# Patient Record
Sex: MALE | NOT HISPANIC OR LATINO
[De-identification: names, ages, dates, MRNs, and addresses within clinical notes are randomized per-mention and may not be internally consistent; named-entity substitution may affect disease eponyms.]

---

## 2017-10-23 ENCOUNTER — CHART COPY (OUTPATIENT)
Age: 67
End: 2017-10-23

## 2017-10-25 ENCOUNTER — APPOINTMENT (OUTPATIENT)
Dept: ORTHOPEDIC SURGERY | Facility: CLINIC | Age: 67
End: 2017-10-25
Payer: COMMERCIAL

## 2017-10-25 VITALS
WEIGHT: 137 LBS | HEIGHT: 62 IN | HEART RATE: 97 BPM | DIASTOLIC BLOOD PRESSURE: 73 MMHG | SYSTOLIC BLOOD PRESSURE: 129 MMHG | BODY MASS INDEX: 25.21 KG/M2

## 2017-10-25 DIAGNOSIS — M54.16 RADICULOPATHY, LUMBAR REGION: ICD-10-CM

## 2017-10-25 PROCEDURE — 99213 OFFICE O/P EST LOW 20 MIN: CPT

## 2017-10-26 ENCOUNTER — EMERGENCY (EMERGENCY)
Facility: HOSPITAL | Age: 67
LOS: 1 days | Discharge: HOME | End: 2017-10-26
Attending: EMERGENCY MEDICINE | Admitting: EMERGENCY MEDICINE
Payer: COMMERCIAL

## 2017-10-26 VITALS
SYSTOLIC BLOOD PRESSURE: 128 MMHG | RESPIRATION RATE: 17 BRPM | OXYGEN SATURATION: 99 % | TEMPERATURE: 98 F | DIASTOLIC BLOOD PRESSURE: 78 MMHG | HEART RATE: 102 BPM

## 2017-10-26 VITALS
HEIGHT: 63 IN | RESPIRATION RATE: 18 BRPM | DIASTOLIC BLOOD PRESSURE: 93 MMHG | TEMPERATURE: 98 F | OXYGEN SATURATION: 99 % | HEART RATE: 94 BPM | WEIGHT: 132.94 LBS | SYSTOLIC BLOOD PRESSURE: 174 MMHG

## 2017-10-26 LAB
ALBUMIN SERPL ELPH-MCNC: 4.2 G/DL — SIGNIFICANT CHANGE UP (ref 3.3–5)
ALP SERPL-CCNC: 352 U/L — HIGH (ref 40–120)
ALT FLD-CCNC: 35 U/L RC — SIGNIFICANT CHANGE UP (ref 10–45)
ANION GAP SERPL CALC-SCNC: 17 MMOL/L — SIGNIFICANT CHANGE UP (ref 5–17)
APPEARANCE UR: CLEAR — SIGNIFICANT CHANGE UP
APTT BLD: 31.5 SEC — SIGNIFICANT CHANGE UP (ref 27.5–37.4)
AST SERPL-CCNC: 77 U/L — HIGH (ref 10–40)
BASOPHILS # BLD AUTO: 0.1 K/UL — SIGNIFICANT CHANGE UP (ref 0–0.2)
BASOPHILS NFR BLD AUTO: 0.6 % — SIGNIFICANT CHANGE UP (ref 0–2)
BILIRUB SERPL-MCNC: 0.3 MG/DL — SIGNIFICANT CHANGE UP (ref 0.2–1.2)
BILIRUB UR-MCNC: NEGATIVE — SIGNIFICANT CHANGE UP
BUN SERPL-MCNC: 16 MG/DL — SIGNIFICANT CHANGE UP (ref 7–23)
CALCIUM SERPL-MCNC: 9.6 MG/DL — SIGNIFICANT CHANGE UP (ref 8.4–10.5)
CHLORIDE SERPL-SCNC: 99 MMOL/L — SIGNIFICANT CHANGE UP (ref 96–108)
CO2 SERPL-SCNC: 20 MMOL/L — LOW (ref 22–31)
COLOR SPEC: SIGNIFICANT CHANGE UP
CREAT SERPL-MCNC: 0.76 MG/DL — SIGNIFICANT CHANGE UP (ref 0.5–1.3)
DIFF PNL FLD: NEGATIVE — SIGNIFICANT CHANGE UP
EOSINOPHIL # BLD AUTO: 0.2 K/UL — SIGNIFICANT CHANGE UP (ref 0–0.5)
EOSINOPHIL NFR BLD AUTO: 2 % — SIGNIFICANT CHANGE UP (ref 0–6)
GLUCOSE SERPL-MCNC: 316 MG/DL — HIGH (ref 70–99)
GLUCOSE UR QL: >1000
HCT VFR BLD CALC: 41.4 % — SIGNIFICANT CHANGE UP (ref 39–50)
HGB BLD-MCNC: 13.8 G/DL — SIGNIFICANT CHANGE UP (ref 13–17)
INR BLD: 1.03 RATIO — SIGNIFICANT CHANGE UP (ref 0.88–1.16)
KETONES UR-MCNC: NEGATIVE — SIGNIFICANT CHANGE UP
LEUKOCYTE ESTERASE UR-ACNC: NEGATIVE — SIGNIFICANT CHANGE UP
LYMPHOCYTES # BLD AUTO: 1.4 K/UL — SIGNIFICANT CHANGE UP (ref 1–3.3)
LYMPHOCYTES # BLD AUTO: 15.2 % — SIGNIFICANT CHANGE UP (ref 13–44)
MCHC RBC-ENTMCNC: 29 PG — SIGNIFICANT CHANGE UP (ref 27–34)
MCHC RBC-ENTMCNC: 33.3 GM/DL — SIGNIFICANT CHANGE UP (ref 32–36)
MCV RBC AUTO: 87 FL — SIGNIFICANT CHANGE UP (ref 80–100)
MONOCYTES # BLD AUTO: 0.7 K/UL — SIGNIFICANT CHANGE UP (ref 0–0.9)
MONOCYTES NFR BLD AUTO: 8 % — SIGNIFICANT CHANGE UP (ref 2–14)
NEUTROPHILS # BLD AUTO: 6.8 K/UL — SIGNIFICANT CHANGE UP (ref 1.8–7.4)
NEUTROPHILS NFR BLD AUTO: 74.2 % — SIGNIFICANT CHANGE UP (ref 43–77)
NITRITE UR-MCNC: NEGATIVE — SIGNIFICANT CHANGE UP
PH UR: 6 — SIGNIFICANT CHANGE UP (ref 5–8)
PLATELET # BLD AUTO: 278 K/UL — SIGNIFICANT CHANGE UP (ref 150–400)
POTASSIUM SERPL-MCNC: 4.8 MMOL/L — SIGNIFICANT CHANGE UP (ref 3.5–5.3)
POTASSIUM SERPL-SCNC: 4.8 MMOL/L — SIGNIFICANT CHANGE UP (ref 3.5–5.3)
PROT SERPL-MCNC: 7.7 G/DL — SIGNIFICANT CHANGE UP (ref 6–8.3)
PROT UR-MCNC: NEGATIVE — SIGNIFICANT CHANGE UP
PROTHROM AB SERPL-ACNC: 11.2 SEC — SIGNIFICANT CHANGE UP (ref 9.8–12.7)
RBC # BLD: 4.76 M/UL — SIGNIFICANT CHANGE UP (ref 4.2–5.8)
RBC # FLD: 11.9 % — SIGNIFICANT CHANGE UP (ref 10.3–14.5)
SODIUM SERPL-SCNC: 136 MMOL/L — SIGNIFICANT CHANGE UP (ref 135–145)
SP GR SPEC: 1.03 — HIGH (ref 1.01–1.02)
TROPONIN T SERPL-MCNC: <0.01 NG/ML — SIGNIFICANT CHANGE UP (ref 0–0.06)
TROPONIN T SERPL-MCNC: <0.01 NG/ML — SIGNIFICANT CHANGE UP (ref 0–0.06)
UROBILINOGEN FLD QL: NEGATIVE — SIGNIFICANT CHANGE UP
WBC # BLD: 9.1 K/UL — SIGNIFICANT CHANGE UP (ref 3.8–10.5)
WBC # FLD AUTO: 9.1 K/UL — SIGNIFICANT CHANGE UP (ref 3.8–10.5)

## 2017-10-26 PROCEDURE — 85610 PROTHROMBIN TIME: CPT

## 2017-10-26 PROCEDURE — 71275 CT ANGIOGRAPHY CHEST: CPT

## 2017-10-26 PROCEDURE — 99285 EMERGENCY DEPT VISIT HI MDM: CPT | Mod: 25

## 2017-10-26 PROCEDURE — 71046 X-RAY EXAM CHEST 2 VIEWS: CPT

## 2017-10-26 PROCEDURE — 93005 ELECTROCARDIOGRAM TRACING: CPT

## 2017-10-26 PROCEDURE — 85379 FIBRIN DEGRADATION QUANT: CPT

## 2017-10-26 PROCEDURE — 81003 URINALYSIS AUTO W/O SCOPE: CPT

## 2017-10-26 PROCEDURE — 84484 ASSAY OF TROPONIN QUANT: CPT

## 2017-10-26 PROCEDURE — 71020: CPT | Mod: 26

## 2017-10-26 PROCEDURE — 85027 COMPLETE CBC AUTOMATED: CPT

## 2017-10-26 PROCEDURE — 93010 ELECTROCARDIOGRAM REPORT: CPT

## 2017-10-26 PROCEDURE — 80053 COMPREHEN METABOLIC PANEL: CPT

## 2017-10-26 PROCEDURE — 99284 EMERGENCY DEPT VISIT MOD MDM: CPT | Mod: 25

## 2017-10-26 PROCEDURE — 71275 CT ANGIOGRAPHY CHEST: CPT | Mod: 26

## 2017-10-26 PROCEDURE — 85730 THROMBOPLASTIN TIME PARTIAL: CPT

## 2017-10-26 RX ORDER — ACETAMINOPHEN 500 MG
650 TABLET ORAL ONCE
Qty: 0 | Refills: 0 | Status: COMPLETED | OUTPATIENT
Start: 2017-10-26 | End: 2017-10-26

## 2017-10-26 RX ORDER — ASPIRIN/CALCIUM CARB/MAGNESIUM 324 MG
324 TABLET ORAL ONCE
Qty: 0 | Refills: 0 | Status: COMPLETED | OUTPATIENT
Start: 2017-10-26 | End: 2017-10-26

## 2017-10-26 RX ORDER — SODIUM CHLORIDE 9 MG/ML
1000 INJECTION INTRAMUSCULAR; INTRAVENOUS; SUBCUTANEOUS ONCE
Qty: 0 | Refills: 0 | Status: COMPLETED | OUTPATIENT
Start: 2017-10-26 | End: 2017-10-26

## 2017-10-26 RX ORDER — ASPIRIN/CALCIUM CARB/MAGNESIUM 324 MG
324 TABLET ORAL ONCE
Qty: 0 | Refills: 0 | Status: DISCONTINUED | OUTPATIENT
Start: 2017-10-26 | End: 2017-10-26

## 2017-10-26 RX ADMIN — Medication 324 MILLIGRAM(S): at 09:24

## 2017-10-26 RX ADMIN — Medication 650 MILLIGRAM(S): at 09:24

## 2017-10-26 NOTE — ED PROVIDER NOTE - MEDICAL DECISION MAKING DETAILS
67M p/w left flank pain and 1 hour episode of chest pain early this morning now resolved.  +left cva tenderness.  Will obtain labs, ekg, cxr, trop, ua, give IV fluids and reassess.

## 2017-10-26 NOTE — ED PROVIDER NOTE - PHYSICAL EXAMINATION
Gen: NAD, AOx3  Head: NCAT  HEENT: PERRL, oral mucosa moist, normal conjunctiva  Lung: CTAB, no respiratory distress  CV: rrr, no murmurs, Normal perfusion  Abd: soft, NTND, + left CVA tenderness  MSK: No edema, no visible deformities  Neuro: No focal neurologic deficits  Skin: No rash   - Rabia Bustillo, DO

## 2017-10-26 NOTE — ED ADULT NURSE REASSESSMENT NOTE - NS ED NURSE REASSESS COMMENT FT1
Pt aware of the reason for IV access and IV fluids and pt denies that he needs this. MD Bustillo aware. Safety and comfort maintained

## 2017-10-26 NOTE — ED ADULT NURSE NOTE - CHPI ED SYMPTOMS NEG
no vomiting/no hematuria/no burning urination/no blood in stool/no dysuria/no nausea/no abdominal distension/no diarrhea/no chills

## 2017-10-26 NOTE — ED PROVIDER NOTE - CARE PLAN
Principal Discharge DX:	Flank pain  Instructions for follow-up, activity and diet:	- Take Tylenol 650mg every 6 hrs as needed for pain.   - Please follow up with your PMD in 1-2 days

## 2017-10-26 NOTE — ED ADULT NURSE REASSESSMENT NOTE - NS ED NURSE REASSESS COMMENT FT1
Pts HR slightly elevated, MD Bustillo aware and pt instructed to having VS recheck, pt declined and ambulated out of ED,

## 2017-10-26 NOTE — ED PROVIDER NOTE - ATTENDING CONTRIBUTION TO CARE
ATTENDING MD:  Silvio MERCEDES, personally have seen and examined this patient.  I have discussed all aspects of care with the resident physician. Resident note reviewed and agree on plan of care and except where noted.  See HPI, PE, and MDM for details.    GEN: well appearing, mild distress from pain  HEENT: NCAT, anicteric sclerae, mucus membranes are moist, oropharynx is within normal limits, neck supple  CHEST: nontender, decreased BS at L-base, no wheezing rales or rhonci, no resp distress  CV: normal rate, regular rhythm, no murmur, 2+ distal pulses  ABD: soft, nontender  ; no CVAT  MSK: no extermity swelling, tenderness, or assymetry  NEURO: normal mentation, facial symmetry intact, moving all ext    MDM: flank and chest pain with pleural effusion, worst pain at around 1am, low suspicion for ACS will get 2 sets in shrot period as it will be many horus apart. Most likely source of pain is effusion differential being pleuritis, pneumonia, cancer, PE. D-dimer to r/o PE given travel and cough. Dispo per above.

## 2017-10-26 NOTE — ED PROVIDER NOTE - OBJECTIVE STATEMENT
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  Pain started 1am of low left flank.  At 4-5am, patient had mild chest pain that went away after an hour.  The left flank pain was a 9/10 now its improving to 6/10, constant.  Currently patient denies chest pain, shortness of breath, abd pain, nausea/vomiting/diarrhea, fever, chills, dysuria, hematuria.  Pt has had similar left side pain in past.  Pt was able to tolerate PO this morning.    PMD: Dr. 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  Pain started 1am of low left flank.  At 4-5am, patient had mild chest pain that went away after an hour.  The left flank pain was a 9/10 now its improving to 6/10, constant.  Currently patient denies chest pain, shortness of breath, abd pain, nausea/vomiting/diarrhea, fever, chills, dysuria, hematuria.  Pt has had similar left side pain in past.  Pt was able to tolerate PO this morning.  Pt recently returned from trip to [place] a cuople weeks ago, coughing since.  PMD: Dr. Dedrick Bustillo DO

## 2017-10-26 NOTE — ED PROVIDER NOTE - PROGRESS NOTE DETAILS
Pt refusing IV at this time X-ray with pleural effusion, given travel, cough, will get D-dimer. Pain much improved. Pt informed of effusion as well as differential diagnosis D-dimer postive will get CTA

## 2017-10-30 ENCOUNTER — FORM ENCOUNTER (OUTPATIENT)
Age: 67
End: 2017-10-30

## 2017-10-31 ENCOUNTER — APPOINTMENT (OUTPATIENT)
Dept: MRI IMAGING | Facility: IMAGING CENTER | Age: 67
End: 2017-10-31
Payer: COMMERCIAL

## 2017-10-31 ENCOUNTER — OUTPATIENT (OUTPATIENT)
Dept: OUTPATIENT SERVICES | Facility: HOSPITAL | Age: 67
LOS: 1 days | End: 2017-10-31
Payer: COMMERCIAL

## 2017-10-31 DIAGNOSIS — M54.16 RADICULOPATHY, LUMBAR REGION: ICD-10-CM

## 2017-10-31 PROCEDURE — 72158 MRI LUMBAR SPINE W/O & W/DYE: CPT

## 2017-10-31 PROCEDURE — A9585: CPT

## 2017-10-31 PROCEDURE — 72158 MRI LUMBAR SPINE W/O & W/DYE: CPT | Mod: 26

## 2017-11-06 ENCOUNTER — APPOINTMENT (OUTPATIENT)
Dept: CT IMAGING | Facility: HOSPITAL | Age: 67
End: 2017-11-06

## 2017-11-06 ENCOUNTER — RESULT REVIEW (OUTPATIENT)
Age: 67
End: 2017-11-06

## 2017-11-06 ENCOUNTER — OUTPATIENT (OUTPATIENT)
Dept: OUTPATIENT SERVICES | Facility: HOSPITAL | Age: 67
LOS: 1 days | End: 2017-11-06
Payer: MEDICARE

## 2017-11-06 ENCOUNTER — APPOINTMENT (OUTPATIENT)
Dept: ORTHOPEDIC SURGERY | Facility: CLINIC | Age: 67
End: 2017-11-06
Payer: COMMERCIAL

## 2017-11-06 ENCOUNTER — CHART COPY (OUTPATIENT)
Age: 67
End: 2017-11-06

## 2017-11-06 DIAGNOSIS — C41.2 MALIGNANT NEOPLASM OF VERTEBRAL COLUMN: ICD-10-CM

## 2017-11-06 LAB — GLUCOSE BLDC GLUCOMTR-MCNC: 88 MG/DL — SIGNIFICANT CHANGE UP (ref 70–99)

## 2017-11-06 PROCEDURE — 99214 OFFICE O/P EST MOD 30 MIN: CPT

## 2017-11-06 PROCEDURE — 77012 CT SCAN FOR NEEDLE BIOPSY: CPT | Mod: 26

## 2017-11-06 PROCEDURE — 88173 CYTOPATH EVAL FNA REPORT: CPT

## 2017-11-06 PROCEDURE — 72070 X-RAY EXAM THORAC SPINE 2VWS: CPT | Mod: 59

## 2017-11-06 PROCEDURE — 88172 CYTP DX EVAL FNA 1ST EA SITE: CPT

## 2017-11-06 PROCEDURE — 77012 CT SCAN FOR NEEDLE BIOPSY: CPT

## 2017-11-06 PROCEDURE — 72040 X-RAY EXAM NECK SPINE 2-3 VW: CPT

## 2017-11-06 PROCEDURE — 88305 TISSUE EXAM BY PATHOLOGIST: CPT | Mod: 26

## 2017-11-06 PROCEDURE — 72100 X-RAY EXAM L-S SPINE 2/3 VWS: CPT

## 2017-11-06 PROCEDURE — 88305 TISSUE EXAM BY PATHOLOGIST: CPT

## 2017-11-06 PROCEDURE — 82962 GLUCOSE BLOOD TEST: CPT

## 2017-11-06 PROCEDURE — 20225 BONE BIOPSY TROCAR/NDL DEEP: CPT

## 2017-11-06 PROCEDURE — 88173 CYTOPATH EVAL FNA REPORT: CPT | Mod: 26

## 2017-11-07 LAB — NON-GYNECOLOGICAL CYTOLOGY STUDY: SIGNIFICANT CHANGE UP

## 2017-11-08 ENCOUNTER — APPOINTMENT (OUTPATIENT)
Dept: ULTRASOUND IMAGING | Facility: IMAGING CENTER | Age: 67
End: 2017-11-08

## 2017-11-08 ENCOUNTER — CHART COPY (OUTPATIENT)
Age: 67
End: 2017-11-08

## 2017-11-09 ENCOUNTER — CHART COPY (OUTPATIENT)
Age: 67
End: 2017-11-09

## 2017-11-10 ENCOUNTER — APPOINTMENT (OUTPATIENT)
Dept: HEMATOLOGY ONCOLOGY | Facility: CLINIC | Age: 67
End: 2017-11-10

## 2017-11-14 ENCOUNTER — FORM ENCOUNTER (OUTPATIENT)
Age: 67
End: 2017-11-14

## 2017-11-15 ENCOUNTER — RESULT REVIEW (OUTPATIENT)
Age: 67
End: 2017-11-15

## 2017-11-15 ENCOUNTER — APPOINTMENT (OUTPATIENT)
Dept: CT IMAGING | Facility: HOSPITAL | Age: 67
End: 2017-11-15

## 2017-11-15 ENCOUNTER — OUTPATIENT (OUTPATIENT)
Dept: OUTPATIENT SERVICES | Facility: HOSPITAL | Age: 67
LOS: 1 days | Discharge: HOME | End: 2017-11-15

## 2017-11-15 ENCOUNTER — OUTPATIENT (OUTPATIENT)
Dept: OUTPATIENT SERVICES | Facility: HOSPITAL | Age: 67
LOS: 1 days | End: 2017-11-15
Payer: COMMERCIAL

## 2017-11-15 DIAGNOSIS — M89.30 HYPERTROPHY OF BONE, UNSPECIFIED SITE: ICD-10-CM

## 2017-11-15 DIAGNOSIS — C79.9 SECONDARY MALIGNANT NEOPLASM OF UNSPECIFIED SITE: ICD-10-CM

## 2017-11-15 DIAGNOSIS — C41.2 MALIGNANT NEOPLASM OF VERTEBRAL COLUMN: ICD-10-CM

## 2017-11-15 LAB — GLUCOSE BLDC GLUCOMTR-MCNC: 113 MG/DL — HIGH (ref 70–99)

## 2017-11-15 PROCEDURE — 88360 TUMOR IMMUNOHISTOCHEM/MANUAL: CPT

## 2017-11-15 PROCEDURE — 88305 TISSUE EXAM BY PATHOLOGIST: CPT | Mod: 26

## 2017-11-15 PROCEDURE — 88341 IMHCHEM/IMCYTCHM EA ADD ANTB: CPT | Mod: 26

## 2017-11-15 PROCEDURE — 88360 TUMOR IMMUNOHISTOCHEM/MANUAL: CPT | Mod: 26,59

## 2017-11-15 PROCEDURE — 77012 CT SCAN FOR NEEDLE BIOPSY: CPT

## 2017-11-15 PROCEDURE — 88173 CYTOPATH EVAL FNA REPORT: CPT | Mod: 26

## 2017-11-15 PROCEDURE — 88342 IMHCHEM/IMCYTCHM 1ST ANTB: CPT

## 2017-11-15 PROCEDURE — 88305 TISSUE EXAM BY PATHOLOGIST: CPT

## 2017-11-15 PROCEDURE — 82962 GLUCOSE BLOOD TEST: CPT

## 2017-11-15 PROCEDURE — 88173 CYTOPATH EVAL FNA REPORT: CPT

## 2017-11-15 PROCEDURE — 88342 IMHCHEM/IMCYTCHM 1ST ANTB: CPT | Mod: 26

## 2017-11-15 PROCEDURE — 88341 IMHCHEM/IMCYTCHM EA ADD ANTB: CPT

## 2017-11-15 PROCEDURE — 20225 BONE BIOPSY TROCAR/NDL DEEP: CPT

## 2017-11-15 PROCEDURE — 77012 CT SCAN FOR NEEDLE BIOPSY: CPT | Mod: 26

## 2017-11-15 PROCEDURE — 88172 CYTP DX EVAL FNA 1ST EA SITE: CPT

## 2017-11-16 ENCOUNTER — APPOINTMENT (OUTPATIENT)
Dept: RADIATION ONCOLOGY | Facility: CLINIC | Age: 67
End: 2017-11-16
Payer: COMMERCIAL

## 2017-11-16 ENCOUNTER — APPOINTMENT (OUTPATIENT)
Age: 67
End: 2017-11-16
Payer: COMMERCIAL

## 2017-11-16 ENCOUNTER — CHART COPY (OUTPATIENT)
Age: 67
End: 2017-11-16

## 2017-11-16 VITALS
HEIGHT: 62 IN | SYSTOLIC BLOOD PRESSURE: 137 MMHG | RESPIRATION RATE: 14 BRPM | HEART RATE: 92 BPM | WEIGHT: 126 LBS | BODY MASS INDEX: 23.19 KG/M2 | DIASTOLIC BLOOD PRESSURE: 74 MMHG | TEMPERATURE: 98.2 F

## 2017-11-16 VITALS
WEIGHT: 126 LBS | SYSTOLIC BLOOD PRESSURE: 123 MMHG | OXYGEN SATURATION: 95 % | DIASTOLIC BLOOD PRESSURE: 80 MMHG | HEART RATE: 92 BPM | RESPIRATION RATE: 14 BRPM | BODY MASS INDEX: 23.05 KG/M2

## 2017-11-16 PROCEDURE — 99204 OFFICE O/P NEW MOD 45 MIN: CPT

## 2017-11-16 PROCEDURE — 99205 OFFICE O/P NEW HI 60 MIN: CPT | Mod: 25,GC

## 2017-11-17 ENCOUNTER — TRANSCRIPTION ENCOUNTER (OUTPATIENT)
Age: 67
End: 2017-11-17

## 2017-11-17 ENCOUNTER — APPOINTMENT (OUTPATIENT)
Dept: HEMATOLOGY ONCOLOGY | Facility: CLINIC | Age: 67
End: 2017-11-17
Payer: COMMERCIAL

## 2017-11-17 VITALS
HEIGHT: 62.01 IN | RESPIRATION RATE: 16 BRPM | HEART RATE: 74 BPM | SYSTOLIC BLOOD PRESSURE: 115 MMHG | WEIGHT: 125.66 LBS | OXYGEN SATURATION: 97 % | DIASTOLIC BLOOD PRESSURE: 73 MMHG | BODY MASS INDEX: 22.83 KG/M2 | TEMPERATURE: 98 F

## 2017-11-17 DIAGNOSIS — Z80.1 FAMILY HISTORY OF MALIGNANT NEOPLASM OF TRACHEA, BRONCHUS AND LUNG: ICD-10-CM

## 2017-11-17 DIAGNOSIS — Z83.3 FAMILY HISTORY OF DIABETES MELLITUS: ICD-10-CM

## 2017-11-17 DIAGNOSIS — M19.90 UNSPECIFIED OSTEOARTHRITIS, UNSPECIFIED SITE: ICD-10-CM

## 2017-11-17 DIAGNOSIS — M54.6 PAIN IN THORACIC SPINE: ICD-10-CM

## 2017-11-17 DIAGNOSIS — D36.10 BENIGN NEOPLASM OF PERIPHERAL NERVES AND AUTONOMIC NERVOUS SYSTEM, UNSPECIFIED: ICD-10-CM

## 2017-11-17 DIAGNOSIS — M54.5 LOW BACK PAIN: ICD-10-CM

## 2017-11-17 LAB
AFP-TM SERPL-MCNC: 1.1 NG/ML
DEPRECATED KAPPA LC FREE/LAMBDA SER: 1.48 RATIO
HAV IGG+IGM SER QL: REACTIVE
HBV CORE IGM SER QL: NONREACTIVE
HBV SURFACE AB SER QL: NONREACTIVE
HBV SURFACE AG SER QL: NONREACTIVE
HCV AB SER QL: NONREACTIVE
HCV S/CO RATIO: 0.1 S/CO
IGA SER QL IEP: 302 MG/DL
IGG SER QL IEP: 1310 MG/DL
IGM SER QL IEP: 52 MG/DL
INR PPP: 0.94 RATIO
KAPPA LC CSF-MCNC: 1.84 MG/DL
KAPPA LC SERPL-MCNC: 2.73 MG/DL
NON-GYNECOLOGICAL CYTOLOGY STUDY: SIGNIFICANT CHANGE UP
PT BLD: 10.6 SEC

## 2017-11-17 PROCEDURE — 99205 OFFICE O/P NEW HI 60 MIN: CPT

## 2017-11-17 RX ORDER — MELOXICAM 7.5 MG/1
7.5 TABLET ORAL
Qty: 15 | Refills: 0 | Status: DISCONTINUED | COMMUNITY
Start: 2017-11-16 | End: 2017-11-17

## 2017-11-17 RX ORDER — INSULIN GLARGINE 100 [IU]/ML
100 INJECTION, SOLUTION SUBCUTANEOUS
Refills: 0 | Status: ACTIVE | COMMUNITY

## 2017-11-17 RX ORDER — ACETAMINOPHEN AND CODEINE 300; 30 MG/1; MG/1
300-30 TABLET ORAL
Qty: 20 | Refills: 0 | Status: DISCONTINUED | COMMUNITY
Start: 2017-11-16 | End: 2017-11-17

## 2017-11-20 PROBLEM — M19.90 OSTEOARTHRITIS: Status: ACTIVE | Noted: 2017-11-20

## 2017-11-20 PROBLEM — Z80.1 FAMILY HISTORY OF LUNG CANCER: Status: ACTIVE | Noted: 2017-11-20

## 2017-11-20 PROBLEM — M54.5 LUMBAR PAIN: Status: ACTIVE | Noted: 2017-11-20

## 2017-11-20 LAB
ANA SER IF-ACNC: NEGATIVE
HBV E AB SER QL: NEGATIVE
HBV E AG SER QL: NEGATIVE
MITOCHONDRIA AB SER IF-ACNC: NORMAL
SMOOTH MUSCLE AB SER QL IF: NORMAL

## 2017-11-20 RX ORDER — OXYCODONE 5 MG/1
5 TABLET ORAL
Qty: 90 | Refills: 0 | Status: DISCONTINUED | COMMUNITY
Start: 2017-11-17 | End: 2017-11-20

## 2017-11-21 LAB
ALBUMIN SERPL ELPH-MCNC: 4 G/DL
ALP BLD-CCNC: 609 U/L
ALT SERPL-CCNC: 41 U/L
ANION GAP SERPL CALC-SCNC: 19 MMOL/L
AST SERPL-CCNC: 106 U/L
BASOPHILS # BLD AUTO: 0.01 K/UL
BASOPHILS NFR BLD AUTO: 0.1 %
BILIRUB SERPL-MCNC: 0.3 MG/DL
BUN SERPL-MCNC: 22 MG/DL
CALCIUM SERPL-MCNC: 10.6 MG/DL
CHLORIDE SERPL-SCNC: 97 MMOL/L
CO2 SERPL-SCNC: 20 MMOL/L
CREAT SERPL-MCNC: 0.92 MG/DL
EOSINOPHIL # BLD AUTO: 0.16 K/UL
EOSINOPHIL NFR BLD AUTO: 1.5 %
FERRITIN SERPL-MCNC: 495 NG/ML
GGT SERPL-CCNC: 293 U/L
GLUCOSE SERPL-MCNC: 139 MG/DL
HCT VFR BLD CALC: 39.3 %
HGB BLD-MCNC: 13.1 G/DL
IMM GRANULOCYTES NFR BLD AUTO: 0.5 %
IRON SATN MFR SERPL: 12 %
IRON SERPL-MCNC: 23 UG/DL
LYMPHOCYTES # BLD AUTO: 1.57 K/UL
LYMPHOCYTES NFR BLD AUTO: 14.5 %
MAN DIFF?: NORMAL
MCHC RBC-ENTMCNC: 27.7 PG
MCHC RBC-ENTMCNC: 33.3 GM/DL
MCV RBC AUTO: 83.1 FL
MONOCYTES # BLD AUTO: 0.94 K/UL
MONOCYTES NFR BLD AUTO: 8.7 %
NEUTROPHILS # BLD AUTO: 8.13 K/UL
NEUTROPHILS NFR BLD AUTO: 74.7 %
PLATELET # BLD AUTO: 444 K/UL
POTASSIUM SERPL-SCNC: 4.5 MMOL/L
PROT SERPL-MCNC: 8 G/DL
RBC # BLD: 4.73 M/UL
RBC # FLD: 13.2 %
SODIUM SERPL-SCNC: 136 MMOL/L
TIBC SERPL-MCNC: 192 UG/DL
UIBC SERPL-MCNC: 169 UG/DL
WBC # FLD AUTO: 10.86 K/UL

## 2017-11-22 ENCOUNTER — APPOINTMENT (OUTPATIENT)
Dept: MRI IMAGING | Facility: CLINIC | Age: 67
End: 2017-11-22

## 2017-11-22 ENCOUNTER — RESULT REVIEW (OUTPATIENT)
Age: 67
End: 2017-11-22

## 2017-11-22 ENCOUNTER — APPOINTMENT (OUTPATIENT)
Dept: UROLOGY | Facility: CLINIC | Age: 67
End: 2017-11-22
Payer: COMMERCIAL

## 2017-11-22 ENCOUNTER — APPOINTMENT (OUTPATIENT)
Dept: NUCLEAR MEDICINE | Facility: IMAGING CENTER | Age: 67
End: 2017-11-22
Payer: COMMERCIAL

## 2017-11-22 ENCOUNTER — APPOINTMENT (OUTPATIENT)
Dept: HEMATOLOGY ONCOLOGY | Facility: CLINIC | Age: 67
End: 2017-11-22

## 2017-11-22 DIAGNOSIS — N40.1 BENIGN PROSTATIC HYPERPLASIA WITH LOWER URINARY TRACT SYMPMS: ICD-10-CM

## 2017-11-22 DIAGNOSIS — N13.8 BENIGN PROSTATIC HYPERPLASIA WITH LOWER URINARY TRACT SYMPMS: ICD-10-CM

## 2017-11-22 LAB
ACE BLD-CCNC: 3 U/L
ALP BLD-CCNC: 640 U/L
ALP BONE CFR SERPL: 23 %
ALP INTEST CFR SERPL: 0 %
ALP LIVER CFR SERPL: 29 %
ALP MACRO CFR SERPL: 48 %
ALP PLAC CFR SERPL: 0 %
HEV AB SER QL: NEGATIVE
NON-GYNECOLOGICAL CYTOLOGY STUDY: SIGNIFICANT CHANGE UP
NON-GYNECOLOGICAL CYTOLOGY STUDY: SIGNIFICANT CHANGE UP

## 2017-11-22 PROCEDURE — 99205 OFFICE O/P NEW HI 60 MIN: CPT

## 2017-11-23 ENCOUNTER — FORM ENCOUNTER (OUTPATIENT)
Age: 67
End: 2017-11-23

## 2017-11-24 ENCOUNTER — APPOINTMENT (OUTPATIENT)
Dept: MRI IMAGING | Facility: IMAGING CENTER | Age: 67
End: 2017-11-24
Payer: COMMERCIAL

## 2017-11-24 ENCOUNTER — APPOINTMENT (OUTPATIENT)
Dept: NUCLEAR MEDICINE | Facility: IMAGING CENTER | Age: 67
End: 2017-11-24

## 2017-11-24 ENCOUNTER — APPOINTMENT (OUTPATIENT)
Dept: NUCLEAR MEDICINE | Facility: IMAGING CENTER | Age: 67
End: 2017-11-24
Payer: COMMERCIAL

## 2017-11-24 ENCOUNTER — OUTPATIENT (OUTPATIENT)
Dept: OUTPATIENT SERVICES | Facility: HOSPITAL | Age: 67
LOS: 1 days | End: 2017-11-24
Payer: COMMERCIAL

## 2017-11-24 ENCOUNTER — APPOINTMENT (OUTPATIENT)
Dept: HEMATOLOGY ONCOLOGY | Facility: CLINIC | Age: 67
End: 2017-11-24
Payer: MEDICARE

## 2017-11-24 VITALS
HEART RATE: 87 BPM | WEIGHT: 124.56 LBS | DIASTOLIC BLOOD PRESSURE: 72 MMHG | OXYGEN SATURATION: 95 % | SYSTOLIC BLOOD PRESSURE: 115 MMHG | BODY MASS INDEX: 22.35 KG/M2 | HEIGHT: 62.6 IN | RESPIRATION RATE: 16 BRPM | TEMPERATURE: 97.6 F

## 2017-11-24 DIAGNOSIS — C79.9 SECONDARY MALIGNANT NEOPLASM OF UNSPECIFIED SITE: ICD-10-CM

## 2017-11-24 DIAGNOSIS — R77.2 ABNORMALITY OF ALPHAFETOPROTEIN: ICD-10-CM

## 2017-11-24 LAB
APPEARANCE: CLEAR
BACTERIA UR CULT: NORMAL
BACTERIA: NEGATIVE
BILIRUBIN URINE: NEGATIVE
BLOOD URINE: NEGATIVE
COLOR: YELLOW
GLUCOSE QUALITATIVE U: 1000 MG/DL
HEPATITIS E IGM ABY: NORMAL
HYALINE CASTS: 0 /LPF
KETONES URINE: NEGATIVE
LEUKOCYTE ESTERASE URINE: NEGATIVE
MICROSCOPIC-UA: NORMAL
NITRITE URINE: NEGATIVE
PH URINE: 5
PROTEIN URINE: NEGATIVE MG/DL
RED BLOOD CELLS URINE: 1 /HPF
SPECIFIC GRAVITY URINE: 1.04
SQUAMOUS EPITHELIAL CELLS: 0 /HPF
UROBILINOGEN URINE: NEGATIVE MG/DL
WHITE BLOOD CELLS URINE: 1 /HPF

## 2017-11-24 PROCEDURE — 78816 PET IMAGE W/CT FULL BODY: CPT | Mod: 26,PI

## 2017-11-24 PROCEDURE — A9587: CPT

## 2017-11-24 PROCEDURE — 74183 MRI ABD W/O CNTR FLWD CNTR: CPT | Mod: 26

## 2017-11-24 PROCEDURE — A9585: CPT

## 2017-11-24 PROCEDURE — 78816 PET IMAGE W/CT FULL BODY: CPT

## 2017-11-24 PROCEDURE — 99215 OFFICE O/P EST HI 40 MIN: CPT

## 2017-11-24 PROCEDURE — 82565 ASSAY OF CREATININE: CPT

## 2017-11-24 PROCEDURE — 74183 MRI ABD W/O CNTR FLWD CNTR: CPT

## 2017-11-24 RX ORDER — OXYCODONE AND ACETAMINOPHEN 5; 325 MG/1; MG/1
5-325 TABLET ORAL
Qty: 60 | Refills: 0 | Status: DISCONTINUED | COMMUNITY
Start: 2017-11-20 | End: 2017-11-24

## 2017-11-27 LAB — CORE LAB FLUID CYTOLOGY: NORMAL

## 2017-11-29 ENCOUNTER — RESULT REVIEW (OUTPATIENT)
Age: 67
End: 2017-11-29

## 2017-11-29 ENCOUNTER — APPOINTMENT (OUTPATIENT)
Dept: HEMATOLOGY ONCOLOGY | Facility: CLINIC | Age: 67
End: 2017-11-29

## 2017-11-29 ENCOUNTER — OUTPATIENT (OUTPATIENT)
Dept: OUTPATIENT SERVICES | Facility: HOSPITAL | Age: 67
LOS: 1 days | End: 2017-11-29
Payer: COMMERCIAL

## 2017-11-29 DIAGNOSIS — E83.52 HYPERCALCEMIA: ICD-10-CM

## 2017-11-29 LAB — CA-I BLD-SCNC: 1.27 MMOL/L — SIGNIFICANT CHANGE UP (ref 1.12–1.3)

## 2017-11-29 PROCEDURE — 82330 ASSAY OF CALCIUM: CPT

## 2017-11-30 ENCOUNTER — APPOINTMENT (OUTPATIENT)
Age: 67
End: 2017-11-30
Payer: COMMERCIAL

## 2017-11-30 VITALS
RESPIRATION RATE: 17 BRPM | TEMPERATURE: 98 F | HEIGHT: 62 IN | SYSTOLIC BLOOD PRESSURE: 130 MMHG | HEART RATE: 88 BPM | WEIGHT: 120 LBS | DIASTOLIC BLOOD PRESSURE: 74 MMHG | BODY MASS INDEX: 22.08 KG/M2

## 2017-11-30 PROCEDURE — 99214 OFFICE O/P EST MOD 30 MIN: CPT

## 2017-12-03 LAB
ANION GAP SERPL CALC-SCNC: 18 MMOL/L
BUN SERPL-MCNC: 18 MG/DL
CALCIUM SERPL-MCNC: 9.2 MG/DL
CALCIUM SERPL-MCNC: 9.2 MG/DL
CANCER AG125 SERPL-ACNC: 119 U/ML
CANCER AG19-9 SERPL-ACNC: 75.3 U/ML
CANCER AG27-29 SERPL-ACNC: 6.9 U/ML
CEA SERPL-MCNC: 2.6 NG/ML
CGA SERPL-MCNC: 363 NG/ML
CHLORIDE SERPL-SCNC: 94 MMOL/L
CO2 SERPL-SCNC: 21 MMOL/L
CREAT SERPL-MCNC: 0.71 MG/DL
GLUCOSE SERPL-MCNC: 253 MG/DL
PARATHYROID HORMONE INTACT: 22 PG/ML
POTASSIUM SERPL-SCNC: 5.1 MMOL/L
PTH RELATED PROT SERPL-MCNC: <1.1 PMOL/L
SODIUM SERPL-SCNC: 133 MMOL/L

## 2017-12-03 RX ORDER — POLYETHYLENE GLYCOL 3350 17 G/17G
17 POWDER, FOR SOLUTION ORAL DAILY
Qty: 20 | Refills: 3 | Status: ACTIVE | COMMUNITY
Start: 2017-11-24

## 2017-12-05 ENCOUNTER — RESULT REVIEW (OUTPATIENT)
Age: 67
End: 2017-12-05

## 2017-12-05 ENCOUNTER — APPOINTMENT (OUTPATIENT)
Age: 67
End: 2017-12-05

## 2017-12-05 LAB
BASOPHILS # BLD AUTO: 0 K/UL — SIGNIFICANT CHANGE UP (ref 0–0.2)
BASOPHILS NFR BLD AUTO: 0.2 % — SIGNIFICANT CHANGE UP (ref 0–2)
EOSINOPHIL # BLD AUTO: 0 K/UL — SIGNIFICANT CHANGE UP (ref 0–0.5)
EOSINOPHIL NFR BLD AUTO: 0 % — SIGNIFICANT CHANGE UP (ref 0–6)
HCT VFR BLD CALC: 36.2 % — LOW (ref 39–50)
HGB BLD-MCNC: 12.3 G/DL — LOW (ref 13–17)
LYMPHOCYTES # BLD AUTO: 1 K/UL — SIGNIFICANT CHANGE UP (ref 1–3.3)
LYMPHOCYTES # BLD AUTO: 6.9 % — LOW (ref 13–44)
MCHC RBC-ENTMCNC: 27.5 PG — SIGNIFICANT CHANGE UP (ref 27–34)
MCHC RBC-ENTMCNC: 34 G/DL — SIGNIFICANT CHANGE UP (ref 32–36)
MCV RBC AUTO: 80.9 FL — SIGNIFICANT CHANGE UP (ref 80–100)
MONOCYTES # BLD AUTO: 0.9 K/UL — SIGNIFICANT CHANGE UP (ref 0–0.9)
MONOCYTES NFR BLD AUTO: 6.7 % — SIGNIFICANT CHANGE UP (ref 2–14)
NEUTROPHILS # BLD AUTO: 12.1 K/UL — HIGH (ref 1.8–7.4)
NEUTROPHILS NFR BLD AUTO: 86.2 % — HIGH (ref 43–77)
PLATELET # BLD AUTO: 484 K/UL — HIGH (ref 150–400)
RBC # BLD: 4.48 M/UL — SIGNIFICANT CHANGE UP (ref 4.2–5.8)
RBC # FLD: 13.1 % — SIGNIFICANT CHANGE UP (ref 10.3–14.5)
WBC # BLD: 14 K/UL — HIGH (ref 3.8–10.5)
WBC # FLD AUTO: 14 K/UL — HIGH (ref 3.8–10.5)

## 2017-12-06 ENCOUNTER — APPOINTMENT (OUTPATIENT)
Age: 67
End: 2017-12-06

## 2017-12-06 DIAGNOSIS — R11.2 NAUSEA WITH VOMITING, UNSPECIFIED: ICD-10-CM

## 2017-12-06 DIAGNOSIS — Z51.11 ENCOUNTER FOR ANTINEOPLASTIC CHEMOTHERAPY: ICD-10-CM

## 2017-12-06 DIAGNOSIS — C7A.1 MALIGNANT POORLY DIFFERENTIATED NEUROENDOCRINE TUMORS: ICD-10-CM

## 2017-12-06 DIAGNOSIS — E83.52 HYPERCALCEMIA: ICD-10-CM

## 2017-12-07 ENCOUNTER — APPOINTMENT (OUTPATIENT)
Age: 67
End: 2017-12-07

## 2017-12-07 DIAGNOSIS — R52 PAIN, UNSPECIFIED: ICD-10-CM

## 2017-12-13 DIAGNOSIS — E86.0 DEHYDRATION: ICD-10-CM

## 2017-12-14 ENCOUNTER — APPOINTMENT (OUTPATIENT)
Dept: UROLOGY | Facility: CLINIC | Age: 67
End: 2017-12-14

## 2017-12-14 DIAGNOSIS — R77.2 ABNORMALITY OF ALPHAFETOPROTEIN: ICD-10-CM

## 2017-12-15 ENCOUNTER — APPOINTMENT (OUTPATIENT)
Dept: HEMATOLOGY ONCOLOGY | Facility: CLINIC | Age: 67
End: 2017-12-15
Payer: COMMERCIAL

## 2017-12-15 VITALS
RESPIRATION RATE: 16 BRPM | HEART RATE: 80 BPM | BODY MASS INDEX: 21.73 KG/M2 | TEMPERATURE: 98.1 F | SYSTOLIC BLOOD PRESSURE: 112 MMHG | DIASTOLIC BLOOD PRESSURE: 70 MMHG | OXYGEN SATURATION: 100 % | WEIGHT: 118.83 LBS

## 2017-12-15 DIAGNOSIS — Z86.39 PERSONAL HISTORY OF OTHER ENDOCRINE, NUTRITIONAL AND METABOLIC DISEASE: ICD-10-CM

## 2017-12-15 DIAGNOSIS — R43.2 PARAGEUSIA: ICD-10-CM

## 2017-12-15 PROCEDURE — 99214 OFFICE O/P EST MOD 30 MIN: CPT

## 2017-12-15 RX ORDER — ATENOLOL 25 MG/1
25 TABLET ORAL
Refills: 0 | Status: ACTIVE | COMMUNITY

## 2017-12-18 ENCOUNTER — OUTPATIENT (OUTPATIENT)
Dept: OUTPATIENT SERVICES | Facility: HOSPITAL | Age: 67
LOS: 1 days | Discharge: HOME | End: 2017-12-18

## 2017-12-18 ENCOUNTER — APPOINTMENT (OUTPATIENT)
Dept: GERIATRICS | Facility: CLINIC | Age: 67
End: 2017-12-18
Payer: COMMERCIAL

## 2017-12-18 VITALS
SYSTOLIC BLOOD PRESSURE: 120 MMHG | DIASTOLIC BLOOD PRESSURE: 70 MMHG | OXYGEN SATURATION: 96 % | WEIGHT: 116.82 LBS | RESPIRATION RATE: 16 BRPM | TEMPERATURE: 97.6 F | HEART RATE: 90 BPM | BODY MASS INDEX: 21.37 KG/M2

## 2017-12-18 DIAGNOSIS — C7A.1 MALIGNANT POORLY DIFFERENTIATED NEUROENDOCRINE TUMORS: ICD-10-CM

## 2017-12-18 PROCEDURE — 99205 OFFICE O/P NEW HI 60 MIN: CPT

## 2017-12-18 RX ORDER — OSTOMY SUPPLY 2 3/4"
EACH MISCELLANEOUS
Qty: 1 | Refills: 0 | Status: ACTIVE | COMMUNITY
Start: 2017-12-15

## 2017-12-18 RX ORDER — DEXAMETHASONE 2 MG/1
2 TABLET ORAL
Qty: 14 | Refills: 1 | Status: DISCONTINUED | COMMUNITY
Start: 2017-11-24 | End: 2017-12-18

## 2017-12-26 ENCOUNTER — RESULT REVIEW (OUTPATIENT)
Age: 67
End: 2017-12-26

## 2017-12-26 ENCOUNTER — LABORATORY RESULT (OUTPATIENT)
Age: 67
End: 2017-12-26

## 2017-12-26 ENCOUNTER — APPOINTMENT (OUTPATIENT)
Age: 67
End: 2017-12-26

## 2017-12-26 LAB
HCT VFR BLD CALC: 28 % — LOW (ref 39–50)
HGB BLD-MCNC: 9.6 G/DL — LOW (ref 13–17)
MCHC RBC-ENTMCNC: 27.9 PG — SIGNIFICANT CHANGE UP (ref 27–34)
MCHC RBC-ENTMCNC: 34.1 G/DL — SIGNIFICANT CHANGE UP (ref 32–36)
MCV RBC AUTO: 81.8 FL — SIGNIFICANT CHANGE UP (ref 80–100)
PLATELET # BLD AUTO: 614 K/UL — HIGH (ref 150–400)
RBC # BLD: 3.43 M/UL — LOW (ref 4.2–5.8)
RBC # FLD: 16.3 % — HIGH (ref 10.3–14.5)
WBC # BLD: 6.5 K/UL — SIGNIFICANT CHANGE UP (ref 3.8–10.5)
WBC # FLD AUTO: 6.5 K/UL — SIGNIFICANT CHANGE UP (ref 3.8–10.5)

## 2017-12-27 ENCOUNTER — APPOINTMENT (OUTPATIENT)
Age: 67
End: 2017-12-27

## 2017-12-27 DIAGNOSIS — R11.2 NAUSEA WITH VOMITING, UNSPECIFIED: ICD-10-CM

## 2017-12-27 DIAGNOSIS — Z51.11 ENCOUNTER FOR ANTINEOPLASTIC CHEMOTHERAPY: ICD-10-CM

## 2017-12-28 ENCOUNTER — APPOINTMENT (OUTPATIENT)
Age: 67
End: 2017-12-28

## 2017-12-28 ENCOUNTER — LABORATORY RESULT (OUTPATIENT)
Age: 67
End: 2017-12-28

## 2017-12-29 DIAGNOSIS — E86.0 DEHYDRATION: ICD-10-CM

## 2017-12-30 PROBLEM — Z86.39 HISTORY OF HYPERCALCEMIA: Status: RESOLVED | Noted: 2017-11-24 | Resolved: 2017-12-30

## 2017-12-30 PROBLEM — R43.2 ALTERED TASTE: Status: ACTIVE | Noted: 2017-12-30

## 2018-01-09 ENCOUNTER — APPOINTMENT (OUTPATIENT)
Dept: HEMATOLOGY ONCOLOGY | Facility: CLINIC | Age: 68
End: 2018-01-09
Payer: MEDICARE

## 2018-01-09 ENCOUNTER — FORM ENCOUNTER (OUTPATIENT)
Age: 68
End: 2018-01-09

## 2018-01-09 VITALS
OXYGEN SATURATION: 97 % | TEMPERATURE: 98 F | BODY MASS INDEX: 21.9 KG/M2 | RESPIRATION RATE: 16 BRPM | HEART RATE: 98 BPM | WEIGHT: 119.71 LBS | DIASTOLIC BLOOD PRESSURE: 81 MMHG | SYSTOLIC BLOOD PRESSURE: 138 MMHG

## 2018-01-09 PROCEDURE — 99214 OFFICE O/P EST MOD 30 MIN: CPT

## 2018-01-09 RX ORDER — LORAZEPAM 1 MG/1
1 TABLET ORAL DAILY
Qty: 15 | Refills: 0 | Status: DISCONTINUED | COMMUNITY
Start: 2017-11-20 | End: 2018-01-09

## 2018-01-09 RX ORDER — METOCLOPRAMIDE 10 MG/1
10 TABLET ORAL
Qty: 30 | Refills: 3 | Status: ACTIVE | COMMUNITY
Start: 2017-11-24 | End: 1900-01-01

## 2018-01-10 ENCOUNTER — APPOINTMENT (OUTPATIENT)
Dept: CT IMAGING | Facility: IMAGING CENTER | Age: 68
End: 2018-01-10
Payer: MEDICARE

## 2018-01-10 ENCOUNTER — OUTPATIENT (OUTPATIENT)
Dept: OUTPATIENT SERVICES | Facility: HOSPITAL | Age: 68
LOS: 1 days | End: 2018-01-10
Payer: COMMERCIAL

## 2018-01-10 DIAGNOSIS — C7A.1 MALIGNANT POORLY DIFFERENTIATED NEUROENDOCRINE TUMORS: ICD-10-CM

## 2018-01-10 PROCEDURE — 71260 CT THORAX DX C+: CPT | Mod: 26

## 2018-01-10 PROCEDURE — 71260 CT THORAX DX C+: CPT

## 2018-01-10 PROCEDURE — 82565 ASSAY OF CREATININE: CPT

## 2018-01-12 ENCOUNTER — RESULT REVIEW (OUTPATIENT)
Age: 68
End: 2018-01-12

## 2018-01-16 ENCOUNTER — RESULT REVIEW (OUTPATIENT)
Age: 68
End: 2018-01-16

## 2018-01-16 ENCOUNTER — APPOINTMENT (OUTPATIENT)
Age: 68
End: 2018-01-16

## 2018-01-16 LAB
BASOPHILS # BLD AUTO: 0 K/UL — SIGNIFICANT CHANGE UP (ref 0–0.2)
BASOPHILS NFR BLD AUTO: 1.1 % — SIGNIFICANT CHANGE UP (ref 0–2)
EOSINOPHIL # BLD AUTO: 0.4 K/UL — SIGNIFICANT CHANGE UP (ref 0–0.5)
EOSINOPHIL NFR BLD AUTO: 8.7 % — HIGH (ref 0–6)
HCT VFR BLD CALC: 36.4 % — LOW (ref 39–50)
HGB BLD-MCNC: 12.2 G/DL — LOW (ref 13–17)
LYMPHOCYTES # BLD AUTO: 1.4 K/UL — SIGNIFICANT CHANGE UP (ref 1–3.3)
LYMPHOCYTES # BLD AUTO: 30.3 % — SIGNIFICANT CHANGE UP (ref 13–44)
MCHC RBC-ENTMCNC: 28.5 PG — SIGNIFICANT CHANGE UP (ref 27–34)
MCHC RBC-ENTMCNC: 33.7 G/DL — SIGNIFICANT CHANGE UP (ref 32–36)
MCV RBC AUTO: 84.7 FL — SIGNIFICANT CHANGE UP (ref 80–100)
MONOCYTES # BLD AUTO: 0.5 K/UL — SIGNIFICANT CHANGE UP (ref 0–0.9)
MONOCYTES NFR BLD AUTO: 11.7 % — SIGNIFICANT CHANGE UP (ref 2–14)
NEUTROPHILS # BLD AUTO: 2.2 K/UL — SIGNIFICANT CHANGE UP (ref 1.8–7.4)
NEUTROPHILS NFR BLD AUTO: 48.3 % — SIGNIFICANT CHANGE UP (ref 43–77)
PLATELET # BLD AUTO: 266 K/UL — SIGNIFICANT CHANGE UP (ref 150–400)
RBC # BLD: 4.29 M/UL — SIGNIFICANT CHANGE UP (ref 4.2–5.8)
RBC # FLD: 19.2 % — HIGH (ref 10.3–14.5)
WBC # BLD: 4.6 K/UL — SIGNIFICANT CHANGE UP (ref 3.8–10.5)
WBC # FLD AUTO: 4.6 K/UL — SIGNIFICANT CHANGE UP (ref 3.8–10.5)

## 2018-01-17 ENCOUNTER — APPOINTMENT (OUTPATIENT)
Age: 68
End: 2018-01-17

## 2018-01-18 ENCOUNTER — MEDICATION RENEWAL (OUTPATIENT)
Age: 68
End: 2018-01-18

## 2018-01-18 ENCOUNTER — OUTPATIENT (OUTPATIENT)
Dept: OUTPATIENT SERVICES | Facility: HOSPITAL | Age: 68
LOS: 1 days | Discharge: HOME | End: 2018-01-18

## 2018-01-18 ENCOUNTER — APPOINTMENT (OUTPATIENT)
Age: 68
End: 2018-01-18

## 2018-01-18 DIAGNOSIS — C7A.1 MALIGNANT POORLY DIFFERENTIATED NEUROENDOCRINE TUMORS: ICD-10-CM

## 2018-01-19 DIAGNOSIS — Z51.11 ENCOUNTER FOR ANTINEOPLASTIC CHEMOTHERAPY: ICD-10-CM

## 2018-01-19 DIAGNOSIS — R11.2 NAUSEA WITH VOMITING, UNSPECIFIED: ICD-10-CM

## 2018-01-19 DIAGNOSIS — E86.0 DEHYDRATION: ICD-10-CM

## 2018-01-24 ENCOUNTER — OTHER (OUTPATIENT)
Age: 68
End: 2018-01-24

## 2018-01-24 RX ORDER — ONDANSETRON 4 MG/1
4 TABLET ORAL
Qty: 30 | Refills: 1 | Status: ACTIVE | COMMUNITY
Start: 2017-11-24 | End: 1900-01-01

## 2018-01-25 LAB — NON-GYNECOLOGICAL CYTOLOGY STUDY: SIGNIFICANT CHANGE UP

## 2018-01-26 ENCOUNTER — FORM ENCOUNTER (OUTPATIENT)
Age: 68
End: 2018-01-26

## 2018-01-27 ENCOUNTER — OUTPATIENT (OUTPATIENT)
Dept: OUTPATIENT SERVICES | Facility: HOSPITAL | Age: 68
LOS: 1 days | End: 2018-01-27
Payer: MEDICARE

## 2018-01-27 ENCOUNTER — APPOINTMENT (OUTPATIENT)
Dept: MRI IMAGING | Facility: IMAGING CENTER | Age: 68
End: 2018-01-27
Payer: MEDICARE

## 2018-01-27 DIAGNOSIS — Z00.8 ENCOUNTER FOR OTHER GENERAL EXAMINATION: ICD-10-CM

## 2018-01-27 PROCEDURE — A9585: CPT

## 2018-01-27 PROCEDURE — 72197 MRI PELVIS W/O & W/DYE: CPT

## 2018-01-27 PROCEDURE — 82565 ASSAY OF CREATININE: CPT

## 2018-01-27 PROCEDURE — 74183 MRI ABD W/O CNTR FLWD CNTR: CPT | Mod: 26

## 2018-01-27 PROCEDURE — 72197 MRI PELVIS W/O & W/DYE: CPT | Mod: 26

## 2018-01-27 PROCEDURE — 74183 MRI ABD W/O CNTR FLWD CNTR: CPT

## 2018-01-30 ENCOUNTER — APPOINTMENT (OUTPATIENT)
Dept: HEMATOLOGY ONCOLOGY | Facility: CLINIC | Age: 68
End: 2018-01-30
Payer: COMMERCIAL

## 2018-01-30 ENCOUNTER — RESULT REVIEW (OUTPATIENT)
Age: 68
End: 2018-01-30

## 2018-01-30 VITALS
WEIGHT: 125.44 LBS | TEMPERATURE: 98 F | RESPIRATION RATE: 16 BRPM | SYSTOLIC BLOOD PRESSURE: 143 MMHG | HEART RATE: 92 BPM | BODY MASS INDEX: 22.94 KG/M2 | DIASTOLIC BLOOD PRESSURE: 86 MMHG | OXYGEN SATURATION: 99 %

## 2018-01-30 PROCEDURE — 99214 OFFICE O/P EST MOD 30 MIN: CPT

## 2018-01-31 ENCOUNTER — APPOINTMENT (OUTPATIENT)
Dept: ORTHOPEDIC SURGERY | Facility: CLINIC | Age: 68
End: 2018-01-31
Payer: COMMERCIAL

## 2018-01-31 PROCEDURE — 72100 X-RAY EXAM L-S SPINE 2/3 VWS: CPT

## 2018-01-31 PROCEDURE — 99213 OFFICE O/P EST LOW 20 MIN: CPT

## 2018-01-31 PROCEDURE — 72170 X-RAY EXAM OF PELVIS: CPT | Mod: 59

## 2018-02-06 ENCOUNTER — RESULT REVIEW (OUTPATIENT)
Age: 68
End: 2018-02-06

## 2018-02-06 ENCOUNTER — APPOINTMENT (OUTPATIENT)
Age: 68
End: 2018-02-06

## 2018-02-06 LAB
BASOPHILS # BLD AUTO: 0 K/UL — SIGNIFICANT CHANGE UP (ref 0–0.2)
BASOPHILS NFR BLD AUTO: 1 % — SIGNIFICANT CHANGE UP (ref 0–2)
EOSINOPHIL # BLD AUTO: 0.3 K/UL — SIGNIFICANT CHANGE UP (ref 0–0.5)
EOSINOPHIL NFR BLD AUTO: 7.2 % — HIGH (ref 0–6)
HCT VFR BLD CALC: 35.8 % — LOW (ref 39–50)
HGB BLD-MCNC: 12.7 G/DL — LOW (ref 13–17)
LYMPHOCYTES # BLD AUTO: 1.2 K/UL — SIGNIFICANT CHANGE UP (ref 1–3.3)
LYMPHOCYTES # BLD AUTO: 33 % — SIGNIFICANT CHANGE UP (ref 13–44)
MCHC RBC-ENTMCNC: 30.4 PG — SIGNIFICANT CHANGE UP (ref 27–34)
MCHC RBC-ENTMCNC: 35.4 G/DL — SIGNIFICANT CHANGE UP (ref 32–36)
MCV RBC AUTO: 85.8 FL — SIGNIFICANT CHANGE UP (ref 80–100)
MONOCYTES # BLD AUTO: 0.4 K/UL — SIGNIFICANT CHANGE UP (ref 0–0.9)
MONOCYTES NFR BLD AUTO: 10.4 % — SIGNIFICANT CHANGE UP (ref 2–14)
NEUTROPHILS # BLD AUTO: 1.7 K/UL — LOW (ref 1.8–7.4)
NEUTROPHILS NFR BLD AUTO: 48.4 % — SIGNIFICANT CHANGE UP (ref 43–77)
PLATELET # BLD AUTO: 183 K/UL — SIGNIFICANT CHANGE UP (ref 150–400)
RBC # BLD: 4.17 M/UL — LOW (ref 4.2–5.8)
RBC # FLD: 19.6 % — HIGH (ref 10.3–14.5)
WBC # BLD: 3.6 K/UL — LOW (ref 3.8–10.5)
WBC # FLD AUTO: 3.6 K/UL — LOW (ref 3.8–10.5)

## 2018-02-07 ENCOUNTER — APPOINTMENT (OUTPATIENT)
Age: 68
End: 2018-02-07

## 2018-02-07 DIAGNOSIS — R52 PAIN, UNSPECIFIED: ICD-10-CM

## 2018-02-07 LAB — NON-GYNECOLOGICAL CYTOLOGY STUDY: SIGNIFICANT CHANGE UP

## 2018-02-08 ENCOUNTER — APPOINTMENT (OUTPATIENT)
Age: 68
End: 2018-02-08

## 2018-02-09 ENCOUNTER — MEDICATION RENEWAL (OUTPATIENT)
Age: 68
End: 2018-02-09

## 2018-02-11 LAB
ALBUMIN SERPL ELPH-MCNC: 4.2 G/DL
ALP BLD-CCNC: 590 U/L
ALT SERPL-CCNC: 37 U/L
ANION GAP SERPL CALC-SCNC: 15 MMOL/L
AST SERPL-CCNC: 36 U/L
BILIRUB SERPL-MCNC: 0.3 MG/DL
BUN SERPL-MCNC: 16 MG/DL
CALCIUM SERPL-MCNC: 9.9 MG/DL
CHLORIDE SERPL-SCNC: 95 MMOL/L
CO2 SERPL-SCNC: 25 MMOL/L
CREAT SERPL-MCNC: 0.73 MG/DL
GLUCOSE SERPL-MCNC: 282 MG/DL
POTASSIUM SERPL-SCNC: 5 MMOL/L
PROT SERPL-MCNC: 7.5 G/DL
SODIUM SERPL-SCNC: 135 MMOL/L

## 2018-02-15 ENCOUNTER — APPOINTMENT (OUTPATIENT)
Age: 68
End: 2018-02-15
Payer: COMMERCIAL

## 2018-02-15 VITALS
DIASTOLIC BLOOD PRESSURE: 79 MMHG | TEMPERATURE: 97.9 F | HEIGHT: 62 IN | SYSTOLIC BLOOD PRESSURE: 143 MMHG | BODY MASS INDEX: 22.82 KG/M2 | WEIGHT: 124 LBS | RESPIRATION RATE: 17 BRPM | HEART RATE: 106 BPM

## 2018-02-15 DIAGNOSIS — K74.60 UNSPECIFIED CIRRHOSIS OF LIVER: ICD-10-CM

## 2018-02-15 PROCEDURE — 99214 OFFICE O/P EST MOD 30 MIN: CPT

## 2018-02-20 ENCOUNTER — OUTPATIENT (OUTPATIENT)
Dept: OUTPATIENT SERVICES | Facility: HOSPITAL | Age: 68
LOS: 1 days | Discharge: HOME | End: 2018-02-20

## 2018-02-20 DIAGNOSIS — E83.52 HYPERCALCEMIA: ICD-10-CM

## 2018-02-20 DIAGNOSIS — C7A.1 MALIGNANT POORLY DIFFERENTIATED NEUROENDOCRINE TUMORS: ICD-10-CM

## 2018-02-23 ENCOUNTER — APPOINTMENT (OUTPATIENT)
Dept: HEMATOLOGY ONCOLOGY | Facility: CLINIC | Age: 68
End: 2018-02-23
Payer: COMMERCIAL

## 2018-02-23 VITALS
WEIGHT: 127.87 LBS | HEART RATE: 103 BPM | SYSTOLIC BLOOD PRESSURE: 133 MMHG | OXYGEN SATURATION: 99 % | BODY MASS INDEX: 23.39 KG/M2 | TEMPERATURE: 97.8 F | RESPIRATION RATE: 17 BRPM | DIASTOLIC BLOOD PRESSURE: 75 MMHG

## 2018-02-23 DIAGNOSIS — Z87.39 PERSONAL HISTORY OF OTHER DISEASES OF THE MUSCULOSKELETAL SYSTEM AND CONNECTIVE TISSUE: ICD-10-CM

## 2018-02-23 DIAGNOSIS — Z87.2 PERSONAL HISTORY OF DISEASES OF THE SKIN AND SUBCUTANEOUS TISSUE: ICD-10-CM

## 2018-02-23 PROCEDURE — 99214 OFFICE O/P EST MOD 30 MIN: CPT

## 2018-02-27 ENCOUNTER — RESULT REVIEW (OUTPATIENT)
Age: 68
End: 2018-02-27

## 2018-02-27 ENCOUNTER — APPOINTMENT (OUTPATIENT)
Age: 68
End: 2018-02-27

## 2018-02-27 LAB
BASOPHILS # BLD AUTO: 0 K/UL — SIGNIFICANT CHANGE UP (ref 0–0.2)
EOSINOPHIL # BLD AUTO: 0.1 K/UL — SIGNIFICANT CHANGE UP (ref 0–0.5)
EOSINOPHIL NFR BLD AUTO: 6 % — SIGNIFICANT CHANGE UP (ref 0–6)
HCT VFR BLD CALC: 35.6 % — LOW (ref 39–50)
HGB BLD-MCNC: 12.5 G/DL — LOW (ref 13–17)
LYMPHOCYTES # BLD AUTO: 1.2 K/UL — SIGNIFICANT CHANGE UP (ref 1–3.3)
LYMPHOCYTES # BLD AUTO: 43 % — SIGNIFICANT CHANGE UP (ref 13–44)
MCHC RBC-ENTMCNC: 30.8 PG — SIGNIFICANT CHANGE UP (ref 27–34)
MCHC RBC-ENTMCNC: 35.1 G/DL — SIGNIFICANT CHANGE UP (ref 32–36)
MCV RBC AUTO: 87.8 FL — SIGNIFICANT CHANGE UP (ref 80–100)
MONOCYTES # BLD AUTO: 0.5 K/UL — SIGNIFICANT CHANGE UP (ref 0–0.9)
MONOCYTES NFR BLD AUTO: 18 % — HIGH (ref 2–14)
NEUTROPHILS # BLD AUTO: 1.1 K/UL — LOW (ref 1.8–7.4)
NEUTROPHILS NFR BLD AUTO: 33 % — LOW (ref 43–77)
PLAT MORPH BLD: NORMAL — SIGNIFICANT CHANGE UP
PLATELET # BLD AUTO: 143 K/UL — LOW (ref 150–400)
RBC # BLD: 4.06 M/UL — LOW (ref 4.2–5.8)
RBC # FLD: 17.7 % — HIGH (ref 10.3–14.5)
RBC BLD AUTO: SIGNIFICANT CHANGE UP
WBC # BLD: 3 K/UL — LOW (ref 3.8–10.5)
WBC # FLD AUTO: 3 K/UL — LOW (ref 3.8–10.5)

## 2018-02-28 ENCOUNTER — APPOINTMENT (OUTPATIENT)
Age: 68
End: 2018-02-28

## 2018-02-28 ENCOUNTER — LABORATORY RESULT (OUTPATIENT)
Age: 68
End: 2018-02-28

## 2018-02-28 ENCOUNTER — RESULT REVIEW (OUTPATIENT)
Age: 68
End: 2018-02-28

## 2018-02-28 DIAGNOSIS — R11.2 NAUSEA WITH VOMITING, UNSPECIFIED: ICD-10-CM

## 2018-02-28 DIAGNOSIS — E86.0 DEHYDRATION: ICD-10-CM

## 2018-02-28 DIAGNOSIS — Z51.11 ENCOUNTER FOR ANTINEOPLASTIC CHEMOTHERAPY: ICD-10-CM

## 2018-02-28 LAB
BUN SERPL-MCNC: 26 MG/DL — HIGH (ref 7–23)
CA-I BLDA-SCNC: 1.18 MMOL/L — SIGNIFICANT CHANGE UP (ref 1.12–1.3)
CHLORIDE SERPL-SCNC: 99 MMOL/L — SIGNIFICANT CHANGE UP (ref 96–108)
CO2 SERPL-SCNC: 25 MMOL/L — SIGNIFICANT CHANGE UP (ref 22–31)
CREAT SERPL-MCNC: 0.6 MG/DL — SIGNIFICANT CHANGE UP (ref 0.5–1.3)
GLUCOSE SERPL-MCNC: 359 MG/DL — HIGH (ref 70–99)
POTASSIUM SERPL-MCNC: 4.3 MMOL/L — SIGNIFICANT CHANGE UP (ref 3.5–5.3)
POTASSIUM SERPL-SCNC: 4.3 MMOL/L — SIGNIFICANT CHANGE UP (ref 3.5–5.3)
SODIUM SERPL-SCNC: 136 MMOL/L — SIGNIFICANT CHANGE UP (ref 135–145)

## 2018-03-01 ENCOUNTER — APPOINTMENT (OUTPATIENT)
Age: 68
End: 2018-03-01

## 2018-03-02 ENCOUNTER — APPOINTMENT (OUTPATIENT)
Age: 68
End: 2018-03-02

## 2018-03-05 DIAGNOSIS — Z51.89 ENCOUNTER FOR OTHER SPECIFIED AFTERCARE: ICD-10-CM

## 2018-03-06 RX ORDER — SERTRALINE HYDROCHLORIDE 50 MG/1
50 TABLET, FILM COATED ORAL DAILY
Qty: 60 | Refills: 0 | Status: ACTIVE | COMMUNITY
Start: 2018-01-03 | End: 1900-01-01

## 2018-03-16 ENCOUNTER — APPOINTMENT (OUTPATIENT)
Age: 68
End: 2018-03-16
Payer: COMMERCIAL

## 2018-03-16 VITALS
TEMPERATURE: 98 F | OXYGEN SATURATION: 99 % | WEIGHT: 130.07 LBS | HEART RATE: 110 BPM | BODY MASS INDEX: 23.79 KG/M2 | DIASTOLIC BLOOD PRESSURE: 70 MMHG | SYSTOLIC BLOOD PRESSURE: 122 MMHG | RESPIRATION RATE: 18 BRPM

## 2018-03-16 PROCEDURE — 99214 OFFICE O/P EST MOD 30 MIN: CPT

## 2018-03-20 ENCOUNTER — RESULT REVIEW (OUTPATIENT)
Age: 68
End: 2018-03-20

## 2018-03-20 ENCOUNTER — LABORATORY RESULT (OUTPATIENT)
Age: 68
End: 2018-03-20

## 2018-03-20 ENCOUNTER — OUTPATIENT (OUTPATIENT)
Dept: OUTPATIENT SERVICES | Facility: HOSPITAL | Age: 68
LOS: 1 days | Discharge: HOME | End: 2018-03-20

## 2018-03-20 ENCOUNTER — APPOINTMENT (OUTPATIENT)
Age: 68
End: 2018-03-20

## 2018-03-20 DIAGNOSIS — C7A.1 MALIGNANT POORLY DIFFERENTIATED NEUROENDOCRINE TUMORS: ICD-10-CM

## 2018-03-20 DIAGNOSIS — E83.52 HYPERCALCEMIA: ICD-10-CM

## 2018-03-20 LAB
BASOPHILS # BLD AUTO: 0 K/UL — SIGNIFICANT CHANGE UP (ref 0–0.2)
BASOPHILS NFR BLD AUTO: 0.5 % — SIGNIFICANT CHANGE UP (ref 0–2)
EOSINOPHIL # BLD AUTO: 0 K/UL — SIGNIFICANT CHANGE UP (ref 0–0.5)
EOSINOPHIL NFR BLD AUTO: 0 % — SIGNIFICANT CHANGE UP (ref 0–6)
HCT VFR BLD CALC: 34.9 % — LOW (ref 39–50)
HGB BLD-MCNC: 12.4 G/DL — LOW (ref 13–17)
LYMPHOCYTES # BLD AUTO: 1.1 K/UL — SIGNIFICANT CHANGE UP (ref 1–3.3)
LYMPHOCYTES # BLD AUTO: 14.7 % — SIGNIFICANT CHANGE UP (ref 13–44)
MCHC RBC-ENTMCNC: 31.9 PG — SIGNIFICANT CHANGE UP (ref 27–34)
MCHC RBC-ENTMCNC: 35.5 G/DL — SIGNIFICANT CHANGE UP (ref 32–36)
MCV RBC AUTO: 90 FL — SIGNIFICANT CHANGE UP (ref 80–100)
MONOCYTES # BLD AUTO: 0.6 K/UL — SIGNIFICANT CHANGE UP (ref 0–0.9)
MONOCYTES NFR BLD AUTO: 8.8 % — SIGNIFICANT CHANGE UP (ref 2–14)
NEUTROPHILS # BLD AUTO: 5.5 K/UL — SIGNIFICANT CHANGE UP (ref 1.8–7.4)
NEUTROPHILS NFR BLD AUTO: 75.9 % — SIGNIFICANT CHANGE UP (ref 43–77)
PLATELET # BLD AUTO: 155 K/UL — SIGNIFICANT CHANGE UP (ref 150–400)
RBC # BLD: 3.88 M/UL — LOW (ref 4.2–5.8)
RBC # FLD: 17.6 % — HIGH (ref 10.3–14.5)
WBC # BLD: 7.3 K/UL — SIGNIFICANT CHANGE UP (ref 3.8–10.5)
WBC # FLD AUTO: 7.3 K/UL — SIGNIFICANT CHANGE UP (ref 3.8–10.5)

## 2018-03-21 ENCOUNTER — APPOINTMENT (OUTPATIENT)
Age: 68
End: 2018-03-21

## 2018-03-21 DIAGNOSIS — Z51.11 ENCOUNTER FOR ANTINEOPLASTIC CHEMOTHERAPY: ICD-10-CM

## 2018-03-21 DIAGNOSIS — E86.0 DEHYDRATION: ICD-10-CM

## 2018-03-21 DIAGNOSIS — R11.2 NAUSEA WITH VOMITING, UNSPECIFIED: ICD-10-CM

## 2018-03-22 ENCOUNTER — APPOINTMENT (OUTPATIENT)
Age: 68
End: 2018-03-22

## 2018-03-23 DIAGNOSIS — R52 PAIN, UNSPECIFIED: ICD-10-CM

## 2018-03-27 ENCOUNTER — TRANSCRIPTION ENCOUNTER (OUTPATIENT)
Age: 68
End: 2018-03-27

## 2018-04-05 ENCOUNTER — FORM ENCOUNTER (OUTPATIENT)
Age: 68
End: 2018-04-05

## 2018-04-06 ENCOUNTER — OUTPATIENT (OUTPATIENT)
Dept: OUTPATIENT SERVICES | Facility: HOSPITAL | Age: 68
LOS: 1 days | End: 2018-04-06
Payer: COMMERCIAL

## 2018-04-06 ENCOUNTER — APPOINTMENT (OUTPATIENT)
Dept: CT IMAGING | Facility: IMAGING CENTER | Age: 68
End: 2018-04-06
Payer: COMMERCIAL

## 2018-04-06 DIAGNOSIS — C7A.1 MALIGNANT POORLY DIFFERENTIATED NEUROENDOCRINE TUMORS: ICD-10-CM

## 2018-04-06 PROCEDURE — 71260 CT THORAX DX C+: CPT

## 2018-04-06 PROCEDURE — 74177 CT ABD & PELVIS W/CONTRAST: CPT | Mod: 26

## 2018-04-06 PROCEDURE — 74177 CT ABD & PELVIS W/CONTRAST: CPT

## 2018-04-06 PROCEDURE — 71260 CT THORAX DX C+: CPT | Mod: 26

## 2018-04-09 ENCOUNTER — MEDICATION RENEWAL (OUTPATIENT)
Age: 68
End: 2018-04-09

## 2018-04-15 ENCOUNTER — FORM ENCOUNTER (OUTPATIENT)
Age: 68
End: 2018-04-15

## 2018-04-16 ENCOUNTER — APPOINTMENT (OUTPATIENT)
Dept: ORTHOPEDIC SURGERY | Facility: CLINIC | Age: 68
End: 2018-04-16
Payer: COMMERCIAL

## 2018-04-16 ENCOUNTER — APPOINTMENT (OUTPATIENT)
Dept: NUCLEAR MEDICINE | Facility: IMAGING CENTER | Age: 68
End: 2018-04-16
Payer: COMMERCIAL

## 2018-04-16 ENCOUNTER — OUTPATIENT (OUTPATIENT)
Dept: OUTPATIENT SERVICES | Facility: HOSPITAL | Age: 68
LOS: 1 days | End: 2018-04-16
Payer: COMMERCIAL

## 2018-04-16 VITALS
SYSTOLIC BLOOD PRESSURE: 136 MMHG | HEART RATE: 108 BPM | HEIGHT: 62 IN | BODY MASS INDEX: 23.92 KG/M2 | WEIGHT: 130 LBS | DIASTOLIC BLOOD PRESSURE: 83 MMHG

## 2018-04-16 DIAGNOSIS — C7A.1 MALIGNANT POORLY DIFFERENTIATED NEUROENDOCRINE TUMORS: ICD-10-CM

## 2018-04-16 PROCEDURE — 78816 PET IMAGE W/CT FULL BODY: CPT | Mod: 26,PS

## 2018-04-16 PROCEDURE — A9587: CPT

## 2018-04-16 PROCEDURE — 78816 PET IMAGE W/CT FULL BODY: CPT

## 2018-04-16 PROCEDURE — 99213 OFFICE O/P EST LOW 20 MIN: CPT

## 2018-04-17 ENCOUNTER — LABORATORY RESULT (OUTPATIENT)
Age: 68
End: 2018-04-17

## 2018-04-17 ENCOUNTER — APPOINTMENT (OUTPATIENT)
Dept: HEMATOLOGY ONCOLOGY | Facility: CLINIC | Age: 68
End: 2018-04-17
Payer: COMMERCIAL

## 2018-04-17 ENCOUNTER — RESULT REVIEW (OUTPATIENT)
Age: 68
End: 2018-04-17

## 2018-04-17 VITALS
OXYGEN SATURATION: 99 % | TEMPERATURE: 98 F | DIASTOLIC BLOOD PRESSURE: 74 MMHG | WEIGHT: 131.18 LBS | BODY MASS INDEX: 23.99 KG/M2 | RESPIRATION RATE: 16 BRPM | HEART RATE: 107 BPM | SYSTOLIC BLOOD PRESSURE: 124 MMHG

## 2018-04-17 DIAGNOSIS — R39.9 UNSPECIFIED SYMPTOMS AND SIGNS INVOLVING THE GENITOURINARY SYSTEM: ICD-10-CM

## 2018-04-17 DIAGNOSIS — R49.0 DYSPHONIA: ICD-10-CM

## 2018-04-17 DIAGNOSIS — M54.5 LOW BACK PAIN: ICD-10-CM

## 2018-04-17 LAB
BASOPHILS # BLD AUTO: 0 K/UL — SIGNIFICANT CHANGE UP (ref 0–0.2)
BASOPHILS NFR BLD AUTO: 0.1 % — SIGNIFICANT CHANGE UP (ref 0–2)
EOSINOPHIL # BLD AUTO: 0 K/UL — SIGNIFICANT CHANGE UP (ref 0–0.5)
EOSINOPHIL NFR BLD AUTO: 0.6 % — SIGNIFICANT CHANGE UP (ref 0–6)
HCT VFR BLD CALC: 36.4 % — LOW (ref 39–50)
HGB BLD-MCNC: 13 G/DL — SIGNIFICANT CHANGE UP (ref 13–17)
LYMPHOCYTES # BLD AUTO: 0.7 K/UL — LOW (ref 1–3.3)
LYMPHOCYTES # BLD AUTO: 14.5 % — SIGNIFICANT CHANGE UP (ref 13–44)
MCHC RBC-ENTMCNC: 32.4 PG — SIGNIFICANT CHANGE UP (ref 27–34)
MCHC RBC-ENTMCNC: 35.7 G/DL — SIGNIFICANT CHANGE UP (ref 32–36)
MCV RBC AUTO: 90.7 FL — SIGNIFICANT CHANGE UP (ref 80–100)
MONOCYTES # BLD AUTO: 0.2 K/UL — SIGNIFICANT CHANGE UP (ref 0–0.9)
MONOCYTES NFR BLD AUTO: 4.5 % — SIGNIFICANT CHANGE UP (ref 2–14)
NEUTROPHILS # BLD AUTO: 3.9 K/UL — SIGNIFICANT CHANGE UP (ref 1.8–7.4)
NEUTROPHILS NFR BLD AUTO: 80.2 % — HIGH (ref 43–77)
PLATELET # BLD AUTO: 168 K/UL — SIGNIFICANT CHANGE UP (ref 150–400)
RBC # BLD: 4.01 M/UL — LOW (ref 4.2–5.8)
RBC # FLD: 14.5 % — SIGNIFICANT CHANGE UP (ref 10.3–14.5)
WBC # BLD: 4.9 K/UL — SIGNIFICANT CHANGE UP (ref 3.8–10.5)
WBC # FLD AUTO: 4.9 K/UL — SIGNIFICANT CHANGE UP (ref 3.8–10.5)

## 2018-04-17 PROCEDURE — 99214 OFFICE O/P EST MOD 30 MIN: CPT

## 2018-04-24 ENCOUNTER — APPOINTMENT (OUTPATIENT)
Dept: CT IMAGING | Facility: HOSPITAL | Age: 68
End: 2018-04-24

## 2018-04-24 PROBLEM — R39.9 UTI SYMPTOMS: Status: RESOLVED | Noted: 2017-11-22 | Resolved: 2018-04-24

## 2018-04-24 PROBLEM — R49.0 DYSPHONIA: Status: RESOLVED | Noted: 2018-01-28 | Resolved: 2018-04-24

## 2018-04-25 ENCOUNTER — APPOINTMENT (OUTPATIENT)
Dept: RADIATION ONCOLOGY | Facility: CLINIC | Age: 68
End: 2018-04-25
Payer: COMMERCIAL

## 2018-04-25 VITALS
HEART RATE: 115 BPM | RESPIRATION RATE: 16 BRPM | OXYGEN SATURATION: 97 % | TEMPERATURE: 96.8 F | BODY MASS INDEX: 23.55 KG/M2 | WEIGHT: 127.98 LBS | SYSTOLIC BLOOD PRESSURE: 129 MMHG | DIASTOLIC BLOOD PRESSURE: 84 MMHG | HEIGHT: 62 IN

## 2018-04-25 PROCEDURE — 99215 OFFICE O/P EST HI 40 MIN: CPT | Mod: 25

## 2018-04-25 PROCEDURE — 77263 THER RADIOLOGY TX PLNG CPLX: CPT

## 2018-04-26 PROCEDURE — 77334 RADIATION TREATMENT AID(S): CPT | Mod: 26

## 2018-04-26 PROCEDURE — 77290 THER RAD SIMULAJ FIELD CPLX: CPT | Mod: 26

## 2018-04-30 ENCOUNTER — MEDICATION RENEWAL (OUTPATIENT)
Age: 68
End: 2018-04-30

## 2018-05-02 PROCEDURE — 77307 TELETHX ISODOSE PLAN CPLX: CPT | Mod: 26

## 2018-05-03 PROCEDURE — 77280 THER RAD SIMULAJ FIELD SMPL: CPT | Mod: 26

## 2018-05-07 ENCOUNTER — MESSAGE (OUTPATIENT)
Age: 68
End: 2018-05-07

## 2018-05-07 RX ORDER — LACTULOSE 10 G/15ML
10 SOLUTION ORAL DAILY
Qty: 1 | Refills: 3 | Status: ACTIVE | COMMUNITY
Start: 2018-05-07 | End: 1900-01-01

## 2018-05-08 RX ORDER — MORPHINE SULFATE 15 MG/1
15 TABLET, FILM COATED, EXTENDED RELEASE ORAL
Qty: 90 | Refills: 0 | Status: ACTIVE | COMMUNITY
Start: 2017-11-24 | End: 1900-01-01

## 2018-05-09 ENCOUNTER — OUTPATIENT (OUTPATIENT)
Dept: OUTPATIENT SERVICES | Facility: HOSPITAL | Age: 68
LOS: 1 days | Discharge: HOME | End: 2018-05-09

## 2018-05-09 DIAGNOSIS — E83.52 HYPERCALCEMIA: ICD-10-CM

## 2018-05-10 PROCEDURE — 77427 RADIATION TX MANAGEMENT X5: CPT

## 2018-05-11 ENCOUNTER — APPOINTMENT (OUTPATIENT)
Dept: HEMATOLOGY ONCOLOGY | Facility: CLINIC | Age: 68
End: 2018-05-11
Payer: COMMERCIAL

## 2018-05-11 VITALS
BODY MASS INDEX: 22.58 KG/M2 | DIASTOLIC BLOOD PRESSURE: 92 MMHG | SYSTOLIC BLOOD PRESSURE: 146 MMHG | RESPIRATION RATE: 17 BRPM | TEMPERATURE: 98.6 F | HEART RATE: 119 BPM | OXYGEN SATURATION: 98 % | WEIGHT: 123.46 LBS

## 2018-05-11 DIAGNOSIS — K59.00 CONSTIPATION, UNSPECIFIED: ICD-10-CM

## 2018-05-11 DIAGNOSIS — T45.1X5A DRUG-INDUCED POLYNEUROPATHY: ICD-10-CM

## 2018-05-11 DIAGNOSIS — G62.0 DRUG-INDUCED POLYNEUROPATHY: ICD-10-CM

## 2018-05-11 PROCEDURE — 99214 OFFICE O/P EST MOD 30 MIN: CPT

## 2018-05-11 RX ORDER — APREPITANT 125MG-80MG
80 & 125 KIT ORAL
Qty: 6 | Refills: 6 | Status: DISCONTINUED | COMMUNITY
Start: 2018-01-18 | End: 2018-05-11

## 2018-05-14 ENCOUNTER — APPOINTMENT (OUTPATIENT)
Dept: GERIATRICS | Facility: CLINIC | Age: 68
End: 2018-05-14
Payer: COMMERCIAL

## 2018-05-14 VITALS
DIASTOLIC BLOOD PRESSURE: 78 MMHG | WEIGHT: 123.46 LBS | RESPIRATION RATE: 16 BRPM | OXYGEN SATURATION: 97 % | BODY MASS INDEX: 22.58 KG/M2 | TEMPERATURE: 98.1 F | HEART RATE: 113 BPM | SYSTOLIC BLOOD PRESSURE: 120 MMHG

## 2018-05-14 DIAGNOSIS — F41.9 ANXIETY DISORDER, UNSPECIFIED: ICD-10-CM

## 2018-05-14 PROCEDURE — 99215 OFFICE O/P EST HI 40 MIN: CPT

## 2018-05-14 RX ORDER — OXYCODONE 5 MG/1
5 TABLET ORAL
Qty: 180 | Refills: 0 | Status: ACTIVE | COMMUNITY
Start: 2017-11-24 | End: 1900-01-01

## 2018-05-14 RX ORDER — ALPRAZOLAM 0.5 MG/1
0.5 TABLET ORAL
Qty: 60 | Refills: 0 | Status: ACTIVE | COMMUNITY
Start: 2017-12-18 | End: 1900-01-01

## 2018-05-15 PROBLEM — F41.9 ANXIETY: Status: ACTIVE | Noted: 2017-12-21

## 2018-05-16 ENCOUNTER — APPOINTMENT (OUTPATIENT)
Dept: ORTHOPEDIC SURGERY | Facility: CLINIC | Age: 68
End: 2018-05-16
Payer: COMMERCIAL

## 2018-05-16 VITALS
SYSTOLIC BLOOD PRESSURE: 146 MMHG | WEIGHT: 123 LBS | HEIGHT: 62 IN | BODY MASS INDEX: 22.63 KG/M2 | HEART RATE: 112 BPM | DIASTOLIC BLOOD PRESSURE: 74 MMHG

## 2018-05-16 DIAGNOSIS — M48.061 SPINAL STENOSIS, LUMBAR REGION WITHOUT NEUROGENIC CLAUDICATION: ICD-10-CM

## 2018-05-16 PROCEDURE — 99213 OFFICE O/P EST LOW 20 MIN: CPT

## 2018-05-26 ENCOUNTER — LABORATORY RESULT (OUTPATIENT)
Age: 68
End: 2018-05-26

## 2018-05-26 ENCOUNTER — RESULT REVIEW (OUTPATIENT)
Age: 68
End: 2018-05-26

## 2018-05-26 ENCOUNTER — APPOINTMENT (OUTPATIENT)
Age: 68
End: 2018-05-26

## 2018-05-26 LAB
BASOPHILS # BLD AUTO: 0 K/UL — SIGNIFICANT CHANGE UP (ref 0–0.2)
BASOPHILS NFR BLD AUTO: 0.1 % — SIGNIFICANT CHANGE UP (ref 0–2)
EOSINOPHIL # BLD AUTO: 0.1 K/UL — SIGNIFICANT CHANGE UP (ref 0–0.5)
EOSINOPHIL NFR BLD AUTO: 0.8 % — SIGNIFICANT CHANGE UP (ref 0–6)
HCT VFR BLD CALC: 35.4 % — LOW (ref 39–50)
HGB BLD-MCNC: 11.9 G/DL — LOW (ref 13–17)
LYMPHOCYTES # BLD AUTO: 1.1 K/UL — SIGNIFICANT CHANGE UP (ref 1–3.3)
LYMPHOCYTES # BLD AUTO: 10 % — LOW (ref 13–44)
MCHC RBC-ENTMCNC: 29 PG — SIGNIFICANT CHANGE UP (ref 27–34)
MCHC RBC-ENTMCNC: 33.6 G/DL — SIGNIFICANT CHANGE UP (ref 32–36)
MCV RBC AUTO: 86.3 FL — SIGNIFICANT CHANGE UP (ref 80–100)
MONOCYTES # BLD AUTO: 1 K/UL — HIGH (ref 0–0.9)
MONOCYTES NFR BLD AUTO: 9.4 % — SIGNIFICANT CHANGE UP (ref 2–14)
NEUTROPHILS # BLD AUTO: 8.6 K/UL — HIGH (ref 1.8–7.4)
NEUTROPHILS NFR BLD AUTO: 79.7 % — HIGH (ref 43–77)
PLATELET # BLD AUTO: 294 K/UL — SIGNIFICANT CHANGE UP (ref 150–400)
RBC # BLD: 4.1 M/UL — LOW (ref 4.2–5.8)
RBC # FLD: 14.4 % — SIGNIFICANT CHANGE UP (ref 10.3–14.5)
WBC # BLD: 10.8 K/UL — HIGH (ref 3.8–10.5)
WBC # FLD AUTO: 10.8 K/UL — HIGH (ref 3.8–10.5)

## 2018-05-29 ENCOUNTER — FORM ENCOUNTER (OUTPATIENT)
Age: 68
End: 2018-05-29

## 2018-05-30 ENCOUNTER — APPOINTMENT (OUTPATIENT)
Dept: HEMATOLOGY ONCOLOGY | Facility: CLINIC | Age: 68
End: 2018-05-30
Payer: COMMERCIAL

## 2018-05-30 ENCOUNTER — APPOINTMENT (OUTPATIENT)
Dept: CT IMAGING | Facility: IMAGING CENTER | Age: 68
End: 2018-05-30
Payer: COMMERCIAL

## 2018-05-30 ENCOUNTER — OUTPATIENT (OUTPATIENT)
Dept: OUTPATIENT SERVICES | Facility: HOSPITAL | Age: 68
LOS: 1 days | End: 2018-05-30
Payer: COMMERCIAL

## 2018-05-30 VITALS
OXYGEN SATURATION: 97 % | WEIGHT: 116.6 LBS | RESPIRATION RATE: 17 BRPM | SYSTOLIC BLOOD PRESSURE: 134 MMHG | DIASTOLIC BLOOD PRESSURE: 82 MMHG | HEART RATE: 120 BPM | TEMPERATURE: 97.9 F | BODY MASS INDEX: 21.33 KG/M2

## 2018-05-30 DIAGNOSIS — R63.0 ANOREXIA: ICD-10-CM

## 2018-05-30 DIAGNOSIS — C7A.1 MALIGNANT POORLY DIFFERENTIATED NEUROENDOCRINE TUMORS: ICD-10-CM

## 2018-05-30 DIAGNOSIS — R52 PAIN, UNSPECIFIED: ICD-10-CM

## 2018-05-30 PROCEDURE — 74177 CT ABD & PELVIS W/CONTRAST: CPT | Mod: 26

## 2018-05-30 PROCEDURE — 74177 CT ABD & PELVIS W/CONTRAST: CPT

## 2018-05-30 PROCEDURE — 99214 OFFICE O/P EST MOD 30 MIN: CPT

## 2018-05-30 PROCEDURE — 71260 CT THORAX DX C+: CPT | Mod: 26

## 2018-05-30 PROCEDURE — 71260 CT THORAX DX C+: CPT

## 2018-05-30 PROCEDURE — 82565 ASSAY OF CREATININE: CPT

## 2018-05-30 RX ORDER — DRONABINOL 2.5 MG/1
2.5 CAPSULE ORAL
Qty: 60 | Refills: 0 | Status: DISCONTINUED | COMMUNITY
Start: 2018-04-30 | End: 2018-05-30

## 2018-06-05 PROBLEM — R63.0 APPETITE LOSS: Status: ACTIVE | Noted: 2017-12-21

## 2018-06-06 ENCOUNTER — INPATIENT (INPATIENT)
Facility: HOSPITAL | Age: 68
LOS: 5 days | Discharge: HOME | DRG: 637 | End: 2018-06-12
Attending: INTERNAL MEDICINE | Admitting: INTERNAL MEDICINE
Payer: COMMERCIAL

## 2018-06-06 VITALS
OXYGEN SATURATION: 99 % | SYSTOLIC BLOOD PRESSURE: 127 MMHG | HEART RATE: 135 BPM | RESPIRATION RATE: 18 BRPM | HEIGHT: 61 IN | DIASTOLIC BLOOD PRESSURE: 80 MMHG | TEMPERATURE: 99 F | WEIGHT: 115.96 LBS

## 2018-06-06 DIAGNOSIS — E11.10 TYPE 2 DIABETES MELLITUS WITH KETOACIDOSIS WITHOUT COMA: ICD-10-CM

## 2018-06-06 DIAGNOSIS — E87.2 ACIDOSIS: ICD-10-CM

## 2018-06-06 DIAGNOSIS — N39.0 URINARY TRACT INFECTION, SITE NOT SPECIFIED: ICD-10-CM

## 2018-06-06 DIAGNOSIS — E11.9 TYPE 2 DIABETES MELLITUS WITHOUT COMPLICATIONS: ICD-10-CM

## 2018-06-06 DIAGNOSIS — E13.10 OTHER SPECIFIED DIABETES MELLITUS WITH KETOACIDOSIS WITHOUT COMA: ICD-10-CM

## 2018-06-06 LAB
ALBUMIN SERPL ELPH-MCNC: 2.7 G/DL — LOW (ref 3.3–5)
ALP SERPL-CCNC: 679 U/L — HIGH (ref 40–120)
ALT FLD-CCNC: 24 U/L — SIGNIFICANT CHANGE UP (ref 10–45)
ANION GAP SERPL CALC-SCNC: 22 MMOL/L — HIGH (ref 5–17)
ANION GAP SERPL CALC-SCNC: 25 MMOL/L — HIGH (ref 5–17)
ANION GAP SERPL CALC-SCNC: 31 MMOL/L — HIGH (ref 5–17)
APPEARANCE UR: CLEAR — SIGNIFICANT CHANGE UP
APTT BLD: 31.3 SEC — SIGNIFICANT CHANGE UP (ref 27.5–37.4)
AST SERPL-CCNC: 94 U/L — HIGH (ref 10–40)
BACTERIA # UR AUTO: ABNORMAL /HPF
BASOPHILS # BLD AUTO: 0 K/UL — SIGNIFICANT CHANGE UP (ref 0–0.2)
BASOPHILS NFR BLD AUTO: 0.1 % — SIGNIFICANT CHANGE UP (ref 0–2)
BILIRUB SERPL-MCNC: 0.7 MG/DL — SIGNIFICANT CHANGE UP (ref 0.2–1.2)
BILIRUB UR-MCNC: NEGATIVE — SIGNIFICANT CHANGE UP
BUN SERPL-MCNC: 11 MG/DL — SIGNIFICANT CHANGE UP (ref 7–23)
BUN SERPL-MCNC: 12 MG/DL — SIGNIFICANT CHANGE UP (ref 7–23)
BUN SERPL-MCNC: 13 MG/DL — SIGNIFICANT CHANGE UP (ref 7–23)
CALCIUM SERPL-MCNC: 8.2 MG/DL — LOW (ref 8.4–10.5)
CALCIUM SERPL-MCNC: 8.3 MG/DL — LOW (ref 8.4–10.5)
CALCIUM SERPL-MCNC: 8.9 MG/DL — SIGNIFICANT CHANGE UP (ref 8.4–10.5)
CHLORIDE SERPL-SCNC: 85 MMOL/L — LOW (ref 96–108)
CHLORIDE SERPL-SCNC: 94 MMOL/L — LOW (ref 96–108)
CHLORIDE SERPL-SCNC: 95 MMOL/L — LOW (ref 96–108)
CO2 SERPL-SCNC: 14 MMOL/L — LOW (ref 22–31)
CO2 SERPL-SCNC: 16 MMOL/L — LOW (ref 22–31)
CO2 SERPL-SCNC: 19 MMOL/L — LOW (ref 22–31)
COLOR SPEC: YELLOW — SIGNIFICANT CHANGE UP
CREAT SERPL-MCNC: 0.48 MG/DL — LOW (ref 0.5–1.3)
CREAT SERPL-MCNC: 0.51 MG/DL — SIGNIFICANT CHANGE UP (ref 0.5–1.3)
CREAT SERPL-MCNC: 0.53 MG/DL — SIGNIFICANT CHANGE UP (ref 0.5–1.3)
DIFF PNL FLD: NEGATIVE — SIGNIFICANT CHANGE UP
EOSINOPHIL # BLD AUTO: 0.1 K/UL — SIGNIFICANT CHANGE UP (ref 0–0.5)
EOSINOPHIL NFR BLD AUTO: 0.4 % — SIGNIFICANT CHANGE UP (ref 0–6)
EPI CELLS # UR: SIGNIFICANT CHANGE UP /HPF
GAS PNL BLDV: SIGNIFICANT CHANGE UP
GLUCOSE BLDC GLUCOMTR-MCNC: 147 MG/DL — HIGH (ref 70–99)
GLUCOSE SERPL-MCNC: 112 MG/DL — HIGH (ref 70–99)
GLUCOSE SERPL-MCNC: 116 MG/DL — HIGH (ref 70–99)
GLUCOSE SERPL-MCNC: 198 MG/DL — HIGH (ref 70–99)
GLUCOSE UR QL: >1000 MG/DL
HCT VFR BLD CALC: 39 % — SIGNIFICANT CHANGE UP (ref 39–50)
HGB BLD-MCNC: 12.8 G/DL — LOW (ref 13–17)
INR BLD: 1.45 RATIO — HIGH (ref 0.88–1.16)
KETONES UR-MCNC: ABNORMAL
LEUKOCYTE ESTERASE UR-ACNC: ABNORMAL
LYMPHOCYTES # BLD AUTO: 0.9 K/UL — LOW (ref 1–3.3)
LYMPHOCYTES # BLD AUTO: 6.3 % — LOW (ref 13–44)
MCHC RBC-ENTMCNC: 28.9 PG — SIGNIFICANT CHANGE UP (ref 27–34)
MCHC RBC-ENTMCNC: 32.7 GM/DL — SIGNIFICANT CHANGE UP (ref 32–36)
MCV RBC AUTO: 88.2 FL — SIGNIFICANT CHANGE UP (ref 80–100)
MONOCYTES # BLD AUTO: 1.2 K/UL — HIGH (ref 0–0.9)
MONOCYTES NFR BLD AUTO: 8.2 % — SIGNIFICANT CHANGE UP (ref 2–14)
NEUTROPHILS # BLD AUTO: 12.5 K/UL — HIGH (ref 1.8–7.4)
NEUTROPHILS NFR BLD AUTO: 85.1 % — HIGH (ref 43–77)
NITRITE UR-MCNC: NEGATIVE — SIGNIFICANT CHANGE UP
PH UR: 5.5 — SIGNIFICANT CHANGE UP (ref 5–8)
PLATELET # BLD AUTO: 374 K/UL — SIGNIFICANT CHANGE UP (ref 150–400)
POTASSIUM SERPL-MCNC: 3.6 MMOL/L — SIGNIFICANT CHANGE UP (ref 3.5–5.3)
POTASSIUM SERPL-MCNC: 4.1 MMOL/L — SIGNIFICANT CHANGE UP (ref 3.5–5.3)
POTASSIUM SERPL-MCNC: 4.5 MMOL/L — SIGNIFICANT CHANGE UP (ref 3.5–5.3)
POTASSIUM SERPL-SCNC: 3.6 MMOL/L — SIGNIFICANT CHANGE UP (ref 3.5–5.3)
POTASSIUM SERPL-SCNC: 4.1 MMOL/L — SIGNIFICANT CHANGE UP (ref 3.5–5.3)
POTASSIUM SERPL-SCNC: 4.5 MMOL/L — SIGNIFICANT CHANGE UP (ref 3.5–5.3)
PROT SERPL-MCNC: 7.8 G/DL — SIGNIFICANT CHANGE UP (ref 6–8.3)
PROT UR-MCNC: SIGNIFICANT CHANGE UP
PROTHROM AB SERPL-ACNC: 15.9 SEC — HIGH (ref 9.8–12.7)
RBC # BLD: 4.42 M/UL — SIGNIFICANT CHANGE UP (ref 4.2–5.8)
RBC # FLD: 16.1 % — HIGH (ref 10.3–14.5)
RBC CASTS # UR COMP ASSIST: SIGNIFICANT CHANGE UP /HPF (ref 0–2)
SODIUM SERPL-SCNC: 130 MMOL/L — LOW (ref 135–145)
SODIUM SERPL-SCNC: 135 MMOL/L — SIGNIFICANT CHANGE UP (ref 135–145)
SODIUM SERPL-SCNC: 136 MMOL/L — SIGNIFICANT CHANGE UP (ref 135–145)
SP GR SPEC: 1.02 — SIGNIFICANT CHANGE UP (ref 1.01–1.02)
TROPONIN T SERPL-MCNC: <0.01 NG/ML — SIGNIFICANT CHANGE UP (ref 0–0.06)
UROBILINOGEN FLD QL: NEGATIVE — SIGNIFICANT CHANGE UP
WBC # BLD: 14.7 K/UL — HIGH (ref 3.8–10.5)
WBC # FLD AUTO: 14.7 K/UL — HIGH (ref 3.8–10.5)
WBC UR QL: SIGNIFICANT CHANGE UP /HPF (ref 0–5)

## 2018-06-06 PROCEDURE — 71275 CT ANGIOGRAPHY CHEST: CPT | Mod: 26

## 2018-06-06 PROCEDURE — 70450 CT HEAD/BRAIN W/O DYE: CPT | Mod: 26

## 2018-06-06 PROCEDURE — 71045 X-RAY EXAM CHEST 1 VIEW: CPT | Mod: 26

## 2018-06-06 PROCEDURE — 93010 ELECTROCARDIOGRAM REPORT: CPT

## 2018-06-06 PROCEDURE — 99291 CRITICAL CARE FIRST HOUR: CPT

## 2018-06-06 RX ORDER — SODIUM CHLORIDE 9 MG/ML
500 INJECTION INTRAMUSCULAR; INTRAVENOUS; SUBCUTANEOUS ONCE
Qty: 0 | Refills: 0 | Status: COMPLETED | OUTPATIENT
Start: 2018-06-06 | End: 2018-06-06

## 2018-06-06 RX ORDER — MORPHINE SULFATE 50 MG/1
15 CAPSULE, EXTENDED RELEASE ORAL EVERY 4 HOURS
Qty: 0 | Refills: 0 | Status: DISCONTINUED | OUTPATIENT
Start: 2018-06-06 | End: 2018-06-12

## 2018-06-06 RX ORDER — INSULIN LISPRO 100/ML
6 VIAL (ML) SUBCUTANEOUS ONCE
Qty: 0 | Refills: 0 | Status: COMPLETED | OUTPATIENT
Start: 2018-06-06 | End: 2018-06-06

## 2018-06-06 RX ORDER — SODIUM CHLORIDE 9 MG/ML
1000 INJECTION INTRAMUSCULAR; INTRAVENOUS; SUBCUTANEOUS ONCE
Qty: 0 | Refills: 0 | Status: COMPLETED | OUTPATIENT
Start: 2018-06-06 | End: 2018-06-06

## 2018-06-06 RX ORDER — CEFTRIAXONE 500 MG/1
1 INJECTION, POWDER, FOR SOLUTION INTRAMUSCULAR; INTRAVENOUS ONCE
Qty: 0 | Refills: 0 | Status: DISCONTINUED | OUTPATIENT
Start: 2018-06-06 | End: 2018-06-06

## 2018-06-06 RX ORDER — SODIUM CHLORIDE 9 MG/ML
1000 INJECTION, SOLUTION INTRAVENOUS
Qty: 0 | Refills: 0 | Status: COMPLETED | OUTPATIENT
Start: 2018-06-06 | End: 2018-06-06

## 2018-06-06 RX ORDER — CEFTRIAXONE 500 MG/1
1 INJECTION, POWDER, FOR SOLUTION INTRAMUSCULAR; INTRAVENOUS ONCE
Qty: 0 | Refills: 0 | Status: COMPLETED | OUTPATIENT
Start: 2018-06-06 | End: 2018-06-06

## 2018-06-06 RX ORDER — HEPARIN SODIUM 5000 [USP'U]/ML
5000 INJECTION INTRAVENOUS; SUBCUTANEOUS EVERY 8 HOURS
Qty: 0 | Refills: 0 | Status: DISCONTINUED | OUTPATIENT
Start: 2018-06-06 | End: 2018-06-12

## 2018-06-06 RX ORDER — CEFTRIAXONE 500 MG/1
1 INJECTION, POWDER, FOR SOLUTION INTRAMUSCULAR; INTRAVENOUS EVERY 24 HOURS
Qty: 0 | Refills: 0 | Status: DISCONTINUED | OUTPATIENT
Start: 2018-06-07 | End: 2018-06-09

## 2018-06-06 RX ORDER — SODIUM CHLORIDE 9 MG/ML
1000 INJECTION INTRAMUSCULAR; INTRAVENOUS; SUBCUTANEOUS ONCE
Qty: 0 | Refills: 0 | Status: DISCONTINUED | OUTPATIENT
Start: 2018-06-06 | End: 2018-06-06

## 2018-06-06 RX ORDER — DEXTROSE 10 % IN WATER 10 %
1000 INTRAVENOUS SOLUTION INTRAVENOUS
Qty: 0 | Refills: 0 | Status: DISCONTINUED | OUTPATIENT
Start: 2018-06-06 | End: 2018-06-07

## 2018-06-06 RX ADMIN — SODIUM CHLORIDE 1000 MILLILITER(S): 9 INJECTION, SOLUTION INTRAVENOUS at 20:53

## 2018-06-06 RX ADMIN — Medication 1 MILLIGRAM(S): at 14:54

## 2018-06-06 RX ADMIN — SODIUM CHLORIDE 500 MILLILITER(S): 9 INJECTION INTRAMUSCULAR; INTRAVENOUS; SUBCUTANEOUS at 14:57

## 2018-06-06 RX ADMIN — Medication 100 MILLILITER(S): at 22:57

## 2018-06-06 RX ADMIN — Medication 6 UNIT(S): at 15:39

## 2018-06-06 RX ADMIN — SODIUM CHLORIDE 1000 MILLILITER(S): 9 INJECTION INTRAMUSCULAR; INTRAVENOUS; SUBCUTANEOUS at 22:06

## 2018-06-06 RX ADMIN — SODIUM CHLORIDE 1000 MILLILITER(S): 9 INJECTION INTRAMUSCULAR; INTRAVENOUS; SUBCUTANEOUS at 16:11

## 2018-06-06 RX ADMIN — CEFTRIAXONE 100 GRAM(S): 500 INJECTION, POWDER, FOR SOLUTION INTRAMUSCULAR; INTRAVENOUS at 16:10

## 2018-06-06 NOTE — CONSULT NOTE ADULT - PROBLEM SELECTOR RECOMMENDATION 9
Pt with mild acidosis on VBG, with initial anion gap of 31, improved to 22-25, ketones in urine, decreased bicarb of 14-19 likely 2/2 starvation ketoacidosis vs mild DKA in the setting of taking Invokana (SGL-2 inhibitor can cause DKA without elevated glucose levels).   - Continue with fluid hydration with NS   - Obtain beta-hydroxy-butyrate level

## 2018-06-06 NOTE — CONSULT NOTE ADULT - ASSESSMENT
69 yo French speaking male PMHX HTN, type 2 diabetes mellitus (on Lantus), HLD, and stage 4 neuroendocrine CA (not on current chemo, finished radiation 2 weeks prior, currently awaiting trial therapy) presents to ED BIBEMS c/o weakness found to have elevated anion gap to 31 now downtrending likely 2/2 starvation ketoacidosis. 67 yo Polish speaking male PMHX HTN, type 2 diabetes mellitus (on Lantus), HLD, and stage 4 neuroendocrine CA (not on current chemo, finished radiation 2 weeks prior, currently awaiting trial therapy) presents to ED BIBEMS c/o weakness found to have elevated anion gap to 31 now downtrending likely 2/2 starvation ketoacidosis vs mild DKA.

## 2018-06-06 NOTE — CONSULT NOTE ADULT - SUBJECTIVE AND OBJECTIVE BOX
CHIEF COMPLAINT:    HPI: 67 yo Italian speaking male PMHX HTN, type 2 diabetes mellitus (on Lantus), HLD, and stage 4 neuroendocrine CA (not on current chemo, finished radiation 2 weeks prior, currently awaiting trial therapy) presents to ED BIBEMS c/o weakness. Daughter at bedside provides most of history per patient's request. Patient today had a pre-syncopal episode after eating, however never lost consciousness or had trauma. Patient was able to get up and ambulate afterwards. Patient has baseline dizzines.. Patient takes 20U Lantus at bedtime but has missed doses in the past couple of days since he assumed he should not be administering it if he is not eating that much. Since patient's last radiation therapy in May, patient has had decreased appetite, and has only eaten one meal per day. Patient feels hungry but has early satiety per daughter. Patient has also had increased frequency of urinary at home, but without dysuria. Of note patient had CT scan on  showing metastasis to liver and new nodule in lung suggestive of metastasis. Daughter called Dr. Yari Dick pt's oncologist at [facility] and she advised them to come to ED for eval.   Patient currently denies any complaints but he is alert and oriented to person and birthdate and not to location. Per daughter he is feeling very tired, and is a little altered from baseline. He has known tachycardia 2/2 anxiety as he does not like the hospital setting. Otherwise, no history of heart failure, fevers, chills, nausea, vomiting or diarrhea.     Patient hemodynamically stable. In the ER, recevied Ceftriaxone, Humalog 6U, Ativan, 2L fluids    PAST MEDICAL & SURGICAL HISTORY:  Hyperlipidemia  Diabetes  HTN (hypertension)      Allergies    No Known Allergies    Intolerances        HOME MEDICATIONS:    REVIEW OF SYSTEMS:  Constitutional: [x ] negative [ ] fevers [ ] chills [ ] weight loss [ ] weight gain  HEENT: [x ] negative [ ] dry eyes [ ] eye irritation [ ] postnasal drip [ ] nasal congestion  CV: [x ] negative  [ ] chest pain [ ] orthopnea [ ] palpitations [ ] murmur  Resp: [ x] negative [ ] cough [ ] shortness of breath [ ] dyspnea [ ] wheezing [ ] sputum [ ] hemoptysis  GI: [ ] negative [ ] nausea [ ] vomiting [ ] diarrhea [ ] constipation [x ] abd pain [ ] dysphagia   : [ ] negative [ ] dysuria [ ] nocturia [ ] hematuria [x ] increased urinary frequency  Musculoskeletal: [ ] negative [ ] back pain [ ] myalgias [x ] arthralgias [ ] fracture  Skin: [x ] negative [ ] rash [ ] itch  Neurological: [ ] negative [ ] headache [ ] dizziness [ ] syncope [x ] weakness [ ] numbness  Psychiatric: [ ] negative [x ] anxiety [ ] depression  Endocrine: [ ] negative [ x] diabetes [ ] thyroid problem  Hematologic/Lymphatic: [ x] negative [ ] anemia [ ] bleeding problem  Allergic/Immunologic: [x ] negative [ ] itchy eyes [ ] nasal discharge [ ] hives [ ] angioedema  [x ] All other systems negative  [ ] Unable to assess ROS because ________    OBJECTIVE:  ICU Vital Signs Last 24 Hrs  T(C): 37 (2018 19:30), Max: 37.2 (2018 12:24)  T(F): 98.6 (2018 19:30), Max: 98.9 (2018 12:24)  HR: 119 (2018 19:30) (117 - 135)  BP: 131/73 (2018 19:30) (127/80 - 136/70)  BP(mean): --  ABP: --  ABP(mean): --  RR: 27 (2018 19:30) (18 - 27)  SpO2: 99% (2018 19:30) (95% - 99%)        CAPILLARY BLOOD GLUCOSE      POCT Blood Glucose.: 105 mg/dL (2018 18:47)      PHYSICAL EXAM:   General: alert and oriented x 2, lethargic, in no respiratory distress, chronically ill appearing, thin   HEENT: WNL  Respiratory: CTA b/l anterior chest, unable to examine pt posteriorly 2.2 back pain   Cardiovascular: Tachycardic, no murmurs  Abdomen: Soft, diffusely tender to deep palpation, no guarding or rebound tenderness BS+  Extremities: Thin legs, no peripheral edema  Skin: No skin wounds, rash  Neurological: AOx2, no focal deficits    LINES:     HOSPITAL MEDICATIONS:  Standing Meds:  sodium chloride 0.9% 1000 milliLiter(s) IV Continuous <Continuous>      PRN Meds:      LABS:                        12.8   14.7  )-----------( 374      ( 2018 13:19 )             39.0     Hgb Trend: 12.8<--      135  |  94<L>  |  12  ----------------------------<  112<H>  3.6   |  19<L>  |  0.53    Ca    8.2<L>      2018 18:38    TPro  7.8  /  Alb  2.7<L>  /  TBili  0.7  /  DBili  x   /  AST  94<H>  /  ALT  24  /  AlkPhos  679<H>      Creatinine Trend: 0.53<--, 0.48<--  PT/INR - ( 2018 13:19 )   PT: 15.9 sec;   INR: 1.45 ratio         PTT - ( 2018 13:19 )  PTT:31.3 sec  Urinalysis Basic - ( 2018 15:19 )    Color: Yellow / Appearance: Clear / S.024 / pH: x  Gluc: x / Ketone: Large  / Bili: Negative / Urobili: Negative   Blood: x / Protein: Trace / Nitrite: Negative   Leuk Esterase: Large / RBC: 0-2 /HPF / WBC 3-5 /HPF   Sq Epi: x / Non Sq Epi: OCC /HPF / Bacteria: Few /HPF        Venous Blood Gas:   @ 13:19  7.33/33/35/17/55  VBG Lactate: 2.5      MICROBIOLOGY:     RADIOLOGY:  [x ] Reviewed and interpreted by me    < from: Xray Chest 1 View AP/PA (18 @ 13:40) >    EXAM:  XR CHEST AP OR PA 1V                            PROCEDURE DATE:  2018            INTERPRETATION:  A single chest x-ray was obtained on 2018.    Indication: Cough. Rule out aspiration pneumonia.    Impression:    The heart is normal in size. The lungs appear to be clear. Blunting of   both costophrenic angle. No change when compared to previous study done   2017.                    MIRANDA TROTTER M.D., ATTENDING RADIOLOGIST  This document has been electronically signed. 2018  1:48PM    < end of copied text >      EKG: CHIEF COMPLAINT:    HPI: 69 yo Malay speaking male PMHX HTN, type 2 diabetes mellitus (on Lantus and Invokana), HLD, and stage 4 neuroendocrine CA (not on current chemo, finished radiation 2 weeks prior, currently awaiting trial therapy) presents to ED BIBEMS c/o weakness. Daughter at bedside provides most of history per patient's request. Patient today had a pre-syncopal episode after eating, however never lost consciousness or had trauma. Patient was able to get up and ambulate afterwards. Patient has baseline dizziness. Patient takes 20U Lantus at bedtime but has missed doses in the past couple of days since he assumed he should not be administering it if he is not eating that much. Since patient's last radiation therapy in May, patient has had decreased appetite, and has only eaten one meal per day. Patient feels hungry but has early satiety per daughter. Patient has also had increased frequency of urinary at home, but without dysuria. Of note patient had CT scan on  showing metastasis to liver and new nodule in lung suggestive of metastasis. Daughter called Dr. Yari Dick pt's oncologist at [facility] and she advised them to come to ED for eval.   Patient currently denies any complaints but he is alert and oriented to person and birthdate and not to location. Per daughter he is feeling very tired, and is a little altered from baseline. He has known tachycardia 2/2 anxiety as he does not like the hospital setting. Otherwise, no history of heart failure, fevers, chills, nausea, vomiting or diarrhea.     Patient hemodynamically stable. In the ER, recevied Ceftriaxone, Humalog 6U, Ativan, 2L fluids    PAST MEDICAL & SURGICAL HISTORY:  Hyperlipidemia  Diabetes  HTN (hypertension)      Allergies    No Known Allergies    Intolerances        HOME MEDICATIONS:    REVIEW OF SYSTEMS:  Constitutional: [x ] negative [ ] fevers [ ] chills [ ] weight loss [ ] weight gain  HEENT: [x ] negative [ ] dry eyes [ ] eye irritation [ ] postnasal drip [ ] nasal congestion  CV: [x ] negative  [ ] chest pain [ ] orthopnea [ ] palpitations [ ] murmur  Resp: [ x] negative [ ] cough [ ] shortness of breath [ ] dyspnea [ ] wheezing [ ] sputum [ ] hemoptysis  GI: [ ] negative [ ] nausea [ ] vomiting [ ] diarrhea [ ] constipation [x ] abd pain [ ] dysphagia   : [ ] negative [ ] dysuria [ ] nocturia [ ] hematuria [x ] increased urinary frequency  Musculoskeletal: [ ] negative [ ] back pain [ ] myalgias [x ] arthralgias [ ] fracture  Skin: [x ] negative [ ] rash [ ] itch  Neurological: [ ] negative [ ] headache [ ] dizziness [ ] syncope [x ] weakness [ ] numbness  Psychiatric: [ ] negative [x ] anxiety [ ] depression  Endocrine: [ ] negative [ x] diabetes [ ] thyroid problem  Hematologic/Lymphatic: [ x] negative [ ] anemia [ ] bleeding problem  Allergic/Immunologic: [x ] negative [ ] itchy eyes [ ] nasal discharge [ ] hives [ ] angioedema  [x ] All other systems negative  [ ] Unable to assess ROS because ________    OBJECTIVE:  ICU Vital Signs Last 24 Hrs  T(C): 37 (2018 19:30), Max: 37.2 (2018 12:24)  T(F): 98.6 (2018 19:30), Max: 98.9 (2018 12:24)  HR: 119 (2018 19:30) (117 - 135)  BP: 131/73 (2018 19:30) (127/80 - 136/70)  BP(mean): --  ABP: --  ABP(mean): --  RR: 27 (2018 19:30) (18 - 27)  SpO2: 99% (2018 19:30) (95% - 99%)        CAPILLARY BLOOD GLUCOSE      POCT Blood Glucose.: 105 mg/dL (2018 18:47)      PHYSICAL EXAM:   General: alert and oriented x 2, lethargic, in no respiratory distress, chronically ill appearing, thin   HEENT: WNL  Respiratory: CTA b/l anterior chest, unable to examine pt posteriorly 2.2 back pain   Cardiovascular: Tachycardic, no murmurs  Abdomen: Soft, diffusely tender to deep palpation, no guarding or rebound tenderness BS+  Extremities: Thin legs, no peripheral edema  Skin: No skin wounds, rash  Neurological: AOx2, no focal deficits    LINES:     HOSPITAL MEDICATIONS:  Standing Meds:  sodium chloride 0.9% 1000 milliLiter(s) IV Continuous <Continuous>      PRN Meds:      LABS:                        12.8   14.7  )-----------( 374      ( 2018 13:19 )             39.0     Hgb Trend: 12.8<--      135  |  94<L>  |  12  ----------------------------<  112<H>  3.6   |  19<L>  |  0.53    Ca    8.2<L>      2018 18:38    TPro  7.8  /  Alb  2.7<L>  /  TBili  0.7  /  DBili  x   /  AST  94<H>  /  ALT  24  /  AlkPhos  679<H>      Creatinine Trend: 0.53<--, 0.48<--  PT/INR - ( 2018 13:19 )   PT: 15.9 sec;   INR: 1.45 ratio         PTT - ( 2018 13:19 )  PTT:31.3 sec  Urinalysis Basic - ( 2018 15:19 )    Color: Yellow / Appearance: Clear / S.024 / pH: x  Gluc: x / Ketone: Large  / Bili: Negative / Urobili: Negative   Blood: x / Protein: Trace / Nitrite: Negative   Leuk Esterase: Large / RBC: 0-2 /HPF / WBC 3-5 /HPF   Sq Epi: x / Non Sq Epi: OCC /HPF / Bacteria: Few /HPF        Venous Blood Gas:   @ 13:19  7.33/33/35/17/55  VBG Lactate: 2.5      MICROBIOLOGY:     RADIOLOGY:  [x ] Reviewed and interpreted by me    < from: Xray Chest 1 View AP/PA (18 @ 13:40) >    EXAM:  XR CHEST AP OR PA 1V                            PROCEDURE DATE:  2018            INTERPRETATION:  A single chest x-ray was obtained on 2018.    Indication: Cough. Rule out aspiration pneumonia.    Impression:    The heart is normal in size. The lungs appear to be clear. Blunting of   both costophrenic angle. No change when compared to previous study done   2017.                    MIRANDA TROTTER M.D., ATTENDING RADIOLOGIST  This document has been electronically signed. 2018  1:48PM    < end of copied text >      EKG:

## 2018-06-06 NOTE — H&P ADULT - ATTENDING COMMENTS
1. DKA despite glucose 112. Pt is Invokana. Positive glucosuria. Positive B hydroxybutyrate. Pt has not been taking lantus on regular basis . Pt has stage 4 neuroendocrine tumor with Mets to liver and lungs.  Plan : Start d 10to bring glucose 250-300 then start insulin drip.  Pt received 3 liters IV fluids before MICU. Follow BNP q4 hrs . Follow anion gap.  2. UTI : Ceftriaxone  3. Neuroendocrine tumor stage 4.  Will need to discuss treatment options with Oncology.    cc time 35 min

## 2018-06-06 NOTE — ED PROVIDER NOTE - PROGRESS NOTE DETAILS
Called [facility] to discuss case with Dr. Yari Dick as she sent patient in. Awaiting return page. Will w/u for infectious cause for symptoms. MD aware. - Esteban Portillo PA-C Dr. Dick returned call. Informed me patient had been declining at home per reports from daughter and falling recently. Requested CTH to asses metastatic disease. Additionally patient tachycardic for no obvious reason. Given risk factors will r/o PE with CTA chest per MD request. MD aware and agrees. - Esteban Portillo PA-C Attending MD Delong: UA reviewed with Dr. Perez, will give Ceftriaxone dka mild will give sq insulin otherwise nl treatment for dka - suspect will be able to close gap in ed. gap improved to 22 from 31 but still note closed. glucose 105. MICU consulted. Will see patient in ED. - Esteban Portillo PA-C MICU at bedside. - Esteban Portillo PA-C MICU saw pt at bedside and recommending continuing IVF for now with normal saline and they will discuss with their attending for final recs. - Esteban Portillo PA-C MICU saw pt at bedside and recommending continuing IVF for now with normal saline bolus (will run slow, lungs currently CTA bilaterally w/ no wheezing, rales, or rhonchi noted at this time) and they will discuss with their attending for final recs. - Esteban Portillo PA-C MICU called back. Advised to continue with IVF bolus as only gotten 2 total liters thus far. They noted the repeat BMP showing worse gap of 25. Attending is in code and they will come see patient. Recommended beta-hydroxy buterate. - Esteban Portillo PA-C MICU accepted. Advised admit under Berto. MD aware. Will put orders in. - Esteban Portillo PA-C gap improved to 22 from 31 but still note closed. glucose 105. MICU consulted. Will see patient in ED. - Esteban Portillo PA-C    Attending MD Delong: Reviewed new labs, will consult MICU

## 2018-06-06 NOTE — ED ADULT NURSE NOTE - OBJECTIVE STATEMENT
68 year old male a/ox3 daughter at bedside brought in by ambulance s/p dizziness and near-fall today. patient with stage IV neuroendocrine, last chemo in march, last radiation 2 weeks ago, presenting to ed after drinking milk and "feel like it wasn't going down", began having a coughing episode, became dizziness 68 year old male a/ox3 daughter at bedside brought in by ambulance s/p dizziness and near-fall today. patient with stage IV neuroendocrine, last chemo in march, last radiation 2 weeks ago, presenting to ed after drinking milk and "feel like it wasn't going down", began having a coughing episode, became dizziness and almost fell forward, catching himself on a laundry basket. per daughter, patient has been becoming progressively weaker, with episodes of confusion, and decreased po intake. call to Dr. Dick, oncologist today who told pt and daughter to come to ed.

## 2018-06-06 NOTE — ED PROVIDER NOTE - CARE PLAN
Principal Discharge DX:	Type 2 diabetes mellitus with ketoacidosis without coma, unspecified whether long term insulin use  Secondary Diagnosis:	Urinary tract infection without hematuria, site unspecified

## 2018-06-06 NOTE — ED PROVIDER NOTE - SHIFT CHANGE DETAILS
Attending MD Delong: Stage 4 neuroendocrine ca, +tachy, afebrile, presents to the ED with falls, confusion, weakness.  WBC noted 14.7 with shift, electrolyte imbalance noted, AGap 31 noted, insulin 6U humalog ordered, 500 IVFs given, pending 500 IVFs ordered, alk phos noted, CT head and CTA chest pending, no source of infection thus far, UA pending, patient will need admission

## 2018-06-06 NOTE — H&P ADULT - NSHPPHYSICALEXAM_GEN_ALL_CORE
General: alert and oriented x 2, lethargic, in no respiratory distress, chronically ill appearing, thin   HEENT: WNL  Respiratory: CTA b/l anterior chest, unable to examine pt posteriorly 2.2 back pain   Cardiovascular: Tachycardic, no murmurs  Abdomen: Soft, diffusely tender to deep palpation, no guarding or rebound tenderness BS+  Extremities: Thin legs, no peripheral edema  Skin: No skin wounds, rash  Neurological: AOx2, no focal deficits

## 2018-06-06 NOTE — H&P ADULT - NSHPREVIEWOFSYSTEMS_GEN_ALL_CORE
REVIEW OF SYSTEMS:    CONSTITUTIONAL: Generalized weakness. No fevers or chills   RESPIRATORY: No cough, wheezing, hemoptysis; No shortness of breath  CARDIOVASCULAR: No chest pain or palpitations  GASTROINTESTINAL: Abdominal pain. No nausea, vomiting, or hematemesis; No diarrhea or constipation. No melena or hematochezia.  GENITOURINARY: No dysuria, frequency or hematuria  NEUROLOGICAL: No numbness or weakness  SKIN: No itching, burning, rashes, or lesions   All other review of systems is negative unless indicated above.

## 2018-06-06 NOTE — ED ADULT NURSE REASSESSMENT NOTE - COMFORT CARE
assisted to bathroom/side rails up/plan of care explained/treatment delay explained/wait time explained

## 2018-06-06 NOTE — H&P ADULT - HISTORY OF PRESENT ILLNESS
69 yo Belgian speaking male PMHX HTN, type 2 diabetes mellitus (on Lantus and Invokana), HLD, and stage 4 neuroendocrine CA (not on current chemo, finished radiation 2 weeks prior, currently awaiting trial therapy) presents to ED BIBEMS c/o weakness. Daughter at bedside provides most of history per patient's request. Patient today had a pre-syncopal episode after eating, however never lost consciousness or had trauma. Patient was able to get up and ambulate afterwards. Patient has baseline dizziness. Patient takes 20U Lantus at bedtime but has missed doses in the past couple of days since he assumed he should not be administering it if he is not eating that much. Since patient's last radiation therapy in May, patient has had decreased appetite, and has only eaten one meal per day. Patient feels hungry but has early satiety per daughter. Patient has also had increased frequency of urinary at home, but without dysuria. Of note patient had CT scan on 5/30 showing metastasis to liver and new nodule in lung suggestive of metastasis. Daughter called Dr. aYri Dick pt's oncologist at [facility] and she advised them to come to ED for eval.   Patient currently denies any complaints but he is alert and oriented to person and birthdate and not to location. Per daughter he is feeling very tired, and is a little altered from baseline. He has known tachycardia 2/2 anxiety as he does not like the hospital setting. Otherwise, no history of heart failure, fevers, chills, nausea, vomiting or diarrhea.     Patient hemodynamically stable. In the ER, recevied Ceftriaxone, Humalog 6U, Ativan, 2L fluids 67 yo Bahamian speaking male PMHX HTN, type 2 diabetes mellitus (on Lantus and Invokana), HLD, and stage 4 neuroendocrine CA (not on current chemo, finished radiation 2 weeks prior, currently awaiting trial therapy) presents to ED BIBEMS c/o weakness. Daughter at bedside provides most of history per patient's request. Patient today had a pre-syncopal episode after eating, however never lost consciousness or had trauma. Patient was able to get up and ambulate afterwards. Patient has baseline dizziness. Patient takes 20U Lantus at bedtime but has missed doses in the past couple of days since he assumed he should not be administering it if he is not eating that much. Patient last took Invokana yesterday morning. Since patient's last radiation therapy in May, patient has had decreased appetite, and has only eaten one meal per day. Patient feels hungry but has early satiety per daughter. Patient has also had increased frequency of urinary at home, but without dysuria. Of note patient had CT scan on 5/30 showing metastasis to liver and new nodule in lung suggestive of metastasis. Daughter called Dr. Yari Dick pt's oncologist at [facility] and she advised them to come to ED for eval.   Patient currently denies any complaints but he is alert and oriented to person and birthdate and not to location. Per daughter he is feeling very tired, and is a little altered from baseline. He has known tachycardia 2/2 anxiety as he does not like the hospital setting. Otherwise, no history of heart failure, fevers, chills, nausea, vomiting or diarrhea.     Patient hemodynamically stable. In the ER, recevied Ceftriaxone, Humalog 6U, Ativan, 2L fluids

## 2018-06-06 NOTE — ED PROVIDER NOTE - MEDICAL DECISION MAKING DETAILS
69 yo male hx stage 4 neuroendocrine CA not on chemo presents c/o weakness s/p episode of choking on milk at home with subsequent fall forward. No head strike, no LOC, no AC. Appears well and in NAD although tachycardic. Will r/o infectious process given CA hx vs possible aspiration. Basic labs, cxr, UA/Ucx. Will reach out to d/w Dr. Kapil Meza CA center.

## 2018-06-06 NOTE — H&P ADULT - NSHPLABSRESULTS_GEN_ALL_CORE
CARDIAC MARKERS ( 2018 13:19 )  x     / <0.01 ng/mL / x     / x     / x                           12.8   14.7  )-----------( 374      ( 2018 13:19 )             39.0         136  |  95<L>  |  11  ----------------------------<  116<H>  4.1   |  16<L>  |  0.51    Ca    8.3<L>      2018 20:49    TPro  7.8  /  Alb  2.7<L>  /  TBili  0.7  /  DBili  x   /  AST  94<H>  /  ALT  24  /  AlkPhos  679<H>      CAPILLARY BLOOD GLUCOSE      POCT Blood Glucose.: 105 mg/dL (2018 18:47)    PT/INR - ( 2018 13:19 )   PT: 15.9 sec;   INR: 1.45 ratio         PTT - ( 2018 13:19 )  PTT:31.3 sec  Urinalysis Basic - ( 2018 15:19 )    Color: Yellow / Appearance: Clear / S.024 / pH: x  Gluc: x / Ketone: Large  / Bili: Negative / Urobili: Negative   Blood: x / Protein: Trace / Nitrite: Negative   Leuk Esterase: Large / RBC: 0-2 /HPF / WBC 3-5 /HPF   Sq Epi: x / Non Sq Epi: OCC /HPF / Bacteria: Few /HPF    < from: Xray Chest 1 View AP/PA (18 @ 13:40) >      EXAM:  XR CHEST AP OR PA 1V                            PROCEDURE DATE:  2018            INTERPRETATION:  A single chest x-ray was obtained on 2018.    Indication: Cough. Rule out aspiration pneumonia.    Impression:    The heart is normal in size. The lungs appear to be clear. Blunting of   both costophrenic angle. No change when compared to previous study done   2017.                    MIRANDA TROTTER M.D., ATTENDING RADIOLOGIST  This document has been electronically signed. 2018  1:48PM        < end of copied text >

## 2018-06-06 NOTE — ED ADULT NURSE REASSESSMENT NOTE - NS ED NURSE REASSESS COMMENT FT1
patient at ct scan
repeat CMP collected and sent to Lab, results pending. will monitor.
received report from Juan KRAMER. pt is Awaiting fluid replacement and awaiting consult by MD. pt is being evaluated by MICU for Increase anion gap. pt has x2 side rails up. daughter next to bedside. call bell in hand. will cont to wait and monitor pt. comfort measures in place.

## 2018-06-06 NOTE — ED PROVIDER NOTE - ATTENDING CONTRIBUTION TO CARE
pt w weakness and aspiration event.   neuro - endocrine ca  tachy, reg, ambulatory, clear lungs  ct head, basic labs, ua. search for infection, consider pe.

## 2018-06-06 NOTE — H&P ADULT - ASSESSMENT
69 yo Bulgarian speaking male PMHX HTN, type 2 diabetes mellitus (on Lantus), HLD, and stage 4 neuroendocrine CA (not on current chemo, finished radiation 2 weeks prior, currently awaiting trial therapy) presents to ED BIBEMS c/o weakness found to have elevated anion gap to 31, decreased bicarb, mild acidosis with BHB positive of 6.6 likely consistent with mild DKA. 67 yo British speaking male PMHX HTN, type 2 diabetes mellitus (on Lantus), HLD, and stage 4 neuroendocrine CA (not on current chemo, finished radiation 2 weeks prior, currently awaiting trial therapy) presents to ED BIBEMS c/o weakness found to have elevated anion gap to 31, decreased bicarb, mild acidosis with BHB positive of 6.6 likely consistent with mild DKA.     Plan:    Neuro: Baseline dementia, mild confusion likely metabolic encephalopathy 2/2 DKA and UTI. Currently AO x2    Cardiovascular: No acute issues. No pressor requirements. Hemodynamically stable. Continue to monitor tachycardia.     Respiratory: No acute issues, saturating well on room air.     GI: Will keep NPO except meds until anion gap closes.     Renal: No acute issues, no chronic naylor. Monitor i's and O's.     Endocrine: Mild DKA, likely 2/2 SGLT-1 Inhibitor use at home, last dose yesterday morning. s.p 6 humalog in the ER.   BHB elevated to 6.6 with gap in the 20's  - Start D10 at rate of 100cc/ hour  - Check FS q1H. When FS reaches 200's will initiate insulin gtt   - Check BMP q4H, trend BHB  - Will monitor electrolytes   - keep NPO until patient's gap closes  - Check HBA1C  - Endocrine c/s in AM     Heme:  - Metastatic neuroendocrine tumor not on active chemo  - Heme c/s in AM   - continue outpatient management  - C/w pain control    DVT PPX:  HSQ    Carrie Colmenares MD PGY-2

## 2018-06-06 NOTE — ED PROVIDER NOTE - OBJECTIVE STATEMENT
69 yo male PMHX HTN, type 2 diabetes mellitus, HLD, and stage 4 neuroendocrine CA presents to ED c/o weakness. 69 yo male PMHX HTN, type 2 diabetes mellitus, HLD, and stage 4 neuroendocrine CA (not on current chemo, finished radiation 2 weeks prior, currently awaiting trial therapy) presents to ED BIBEMS c/o weakness. Daughter at bedside provides most of history per patient's request. She states this AM patient was eating breakfast, drank milk and "felt like it wasn't going down", had subsequent coughing episode, stood up and fell forward, catching himself on laundry basket. Daughter denies LOC/head strike and states pt got up on his own immediately after and was ambulatory without difficulty. Now patient endorsing feeling weak, with his normal baseline dizziness and abdominal pain which he attributes to his neuroendocrine cancer. Of note patient had CT scan on 5/30 showing metastasis to liver and new nodule in lung suggestive of metastasis. Daughter called Dr. Yari Dick pt's oncologist at [facility] and she advised them to come to ED for eval. Patient denies current chest pain, sob, n/v/d, dyspnea on exertion, LE swelling, orthopnea, gait abnormality, numbness/tingling in extremities, speech/visual changes. 69 yo male PMHX HTN, type 2 diabetes mellitus, HLD, and stage 4 neuroendocrine CA (not on current chemo, finished radiation 2 weeks prior, currently awaiting trial therapy) presents to ED BIBEMS c/o weakness. Daughter at bedside provides most of history per patient's request. She states this AM patient was eating breakfast, drank milk and "felt like it wasn't going down", had subsequent coughing episode, stood up and fell forward, catching himself on laundry basket. Daughter denies LOC/head strike and states pt got up on his own immediately after and was ambulatory without difficulty. Now patient endorsing feeling weak, with his normal baseline dizziness and abdominal pain which he attributes to his neuroendocrine cancer. Of note patient had CT scan on 5/30 showing metastasis to liver and new nodule in lung suggestive of metastasis. Daughter called Dr. Yari Dick pt's oncologist at [facility] and she advised them to come to ED for eval. Patient denies current chest pain, fever/chills, sob, n/v/d, dyspnea on exertion, LE swelling, orthopnea, gait abnormality, numbness/tingling in extremities, speech/visual changes.

## 2018-06-07 LAB
ANION GAP SERPL CALC-SCNC: 10 MMOL/L — SIGNIFICANT CHANGE UP (ref 5–17)
ANION GAP SERPL CALC-SCNC: 12 MMOL/L — SIGNIFICANT CHANGE UP (ref 5–17)
ANION GAP SERPL CALC-SCNC: 14 MMOL/L — SIGNIFICANT CHANGE UP (ref 5–17)
ANION GAP SERPL CALC-SCNC: 19 MMOL/L — HIGH (ref 5–17)
ANION GAP SERPL CALC-SCNC: 19 MMOL/L — HIGH (ref 5–17)
ANION GAP SERPL CALC-SCNC: 29 MMOL/L — HIGH (ref 5–17)
ANION GAP SERPL CALC-SCNC: 8 MMOL/L — SIGNIFICANT CHANGE UP (ref 5–17)
B-OH-BUTYR SERPL-SCNC: 0.4 MMOL/L — SIGNIFICANT CHANGE UP
B-OH-BUTYR SERPL-SCNC: 0.5 MMOL/L — HIGH
B-OH-BUTYR SERPL-SCNC: 1.6 MMOL/L — HIGH
B-OH-BUTYR SERPL-SCNC: 2.8 MMOL/L — HIGH
B-OH-BUTYR SERPL-SCNC: 6.8 MMOL/L — HIGH
B-OH-BUTYR SERPL-SCNC: 7.3 MMOL/L — HIGH
BASE EXCESS BLDV CALC-SCNC: -3.1 MMOL/L — LOW (ref -2–2)
BASE EXCESS BLDV CALC-SCNC: -9.5 MMOL/L — LOW (ref -2–2)
BASE EXCESS BLDV CALC-SCNC: 1.3 MMOL/L — SIGNIFICANT CHANGE UP (ref -2–2)
BUN SERPL-MCNC: 10 MG/DL — SIGNIFICANT CHANGE UP (ref 7–23)
BUN SERPL-MCNC: 5 MG/DL — LOW (ref 7–23)
BUN SERPL-MCNC: 6 MG/DL — LOW (ref 7–23)
BUN SERPL-MCNC: 7 MG/DL — SIGNIFICANT CHANGE UP (ref 7–23)
BUN SERPL-MCNC: 9 MG/DL — SIGNIFICANT CHANGE UP (ref 7–23)
CALCIUM SERPL-MCNC: 7.2 MG/DL — LOW (ref 8.4–10.5)
CALCIUM SERPL-MCNC: 8.4 MG/DL — SIGNIFICANT CHANGE UP (ref 8.4–10.5)
CALCIUM SERPL-MCNC: 8.5 MG/DL — SIGNIFICANT CHANGE UP (ref 8.4–10.5)
CALCIUM SERPL-MCNC: 8.6 MG/DL — SIGNIFICANT CHANGE UP (ref 8.4–10.5)
CALCIUM SERPL-MCNC: 8.6 MG/DL — SIGNIFICANT CHANGE UP (ref 8.4–10.5)
CALCIUM SERPL-MCNC: 8.7 MG/DL — SIGNIFICANT CHANGE UP (ref 8.4–10.5)
CALCIUM SERPL-MCNC: 8.8 MG/DL — SIGNIFICANT CHANGE UP (ref 8.4–10.5)
CHLORIDE SERPL-SCNC: 100 MMOL/L — SIGNIFICANT CHANGE UP (ref 96–108)
CHLORIDE SERPL-SCNC: 107 MMOL/L — SIGNIFICANT CHANGE UP (ref 96–108)
CHLORIDE SERPL-SCNC: 96 MMOL/L — SIGNIFICANT CHANGE UP (ref 96–108)
CHLORIDE SERPL-SCNC: 97 MMOL/L — SIGNIFICANT CHANGE UP (ref 96–108)
CHLORIDE SERPL-SCNC: 98 MMOL/L — SIGNIFICANT CHANGE UP (ref 96–108)
CHLORIDE SERPL-SCNC: 98 MMOL/L — SIGNIFICANT CHANGE UP (ref 96–108)
CHLORIDE SERPL-SCNC: 99 MMOL/L — SIGNIFICANT CHANGE UP (ref 96–108)
CO2 BLDV-SCNC: 15 MMOL/L — LOW (ref 22–30)
CO2 BLDV-SCNC: 21 MMOL/L — LOW (ref 22–30)
CO2 BLDV-SCNC: 26 MMOL/L — SIGNIFICANT CHANGE UP (ref 22–30)
CO2 SERPL-SCNC: 12 MMOL/L — LOW (ref 22–31)
CO2 SERPL-SCNC: 18 MMOL/L — LOW (ref 22–31)
CO2 SERPL-SCNC: 20 MMOL/L — LOW (ref 22–31)
CO2 SERPL-SCNC: 20 MMOL/L — LOW (ref 22–31)
CO2 SERPL-SCNC: 22 MMOL/L — SIGNIFICANT CHANGE UP (ref 22–31)
CO2 SERPL-SCNC: 23 MMOL/L — SIGNIFICANT CHANGE UP (ref 22–31)
CO2 SERPL-SCNC: 25 MMOL/L — SIGNIFICANT CHANGE UP (ref 22–31)
CREAT SERPL-MCNC: 0.32 MG/DL — LOW (ref 0.5–1.3)
CREAT SERPL-MCNC: 0.37 MG/DL — LOW (ref 0.5–1.3)
CREAT SERPL-MCNC: 0.4 MG/DL — LOW (ref 0.5–1.3)
CREAT SERPL-MCNC: 0.4 MG/DL — LOW (ref 0.5–1.3)
CREAT SERPL-MCNC: 0.43 MG/DL — LOW (ref 0.5–1.3)
CREAT SERPL-MCNC: 0.47 MG/DL — LOW (ref 0.5–1.3)
CREAT SERPL-MCNC: 0.48 MG/DL — LOW (ref 0.5–1.3)
CULTURE RESULTS: SIGNIFICANT CHANGE UP
GAS PNL BLDA: SIGNIFICANT CHANGE UP
GAS PNL BLDV: SIGNIFICANT CHANGE UP
GLUCOSE BLDC GLUCOMTR-MCNC: 140 MG/DL — HIGH (ref 70–99)
GLUCOSE BLDC GLUCOMTR-MCNC: 144 MG/DL — HIGH (ref 70–99)
GLUCOSE BLDC GLUCOMTR-MCNC: 152 MG/DL — HIGH (ref 70–99)
GLUCOSE BLDC GLUCOMTR-MCNC: 153 MG/DL — HIGH (ref 70–99)
GLUCOSE BLDC GLUCOMTR-MCNC: 156 MG/DL — HIGH (ref 70–99)
GLUCOSE BLDC GLUCOMTR-MCNC: 156 MG/DL — HIGH (ref 70–99)
GLUCOSE BLDC GLUCOMTR-MCNC: 159 MG/DL — HIGH (ref 70–99)
GLUCOSE BLDC GLUCOMTR-MCNC: 160 MG/DL — HIGH (ref 70–99)
GLUCOSE BLDC GLUCOMTR-MCNC: 168 MG/DL — HIGH (ref 70–99)
GLUCOSE BLDC GLUCOMTR-MCNC: 174 MG/DL — HIGH (ref 70–99)
GLUCOSE BLDC GLUCOMTR-MCNC: 185 MG/DL — HIGH (ref 70–99)
GLUCOSE BLDC GLUCOMTR-MCNC: 189 MG/DL — HIGH (ref 70–99)
GLUCOSE BLDC GLUCOMTR-MCNC: 190 MG/DL — HIGH (ref 70–99)
GLUCOSE BLDC GLUCOMTR-MCNC: 199 MG/DL — HIGH (ref 70–99)
GLUCOSE BLDC GLUCOMTR-MCNC: 210 MG/DL — HIGH (ref 70–99)
GLUCOSE BLDC GLUCOMTR-MCNC: 260 MG/DL — HIGH (ref 70–99)
GLUCOSE BLDC GLUCOMTR-MCNC: 335 MG/DL — HIGH (ref 70–99)
GLUCOSE BLDC GLUCOMTR-MCNC: 346 MG/DL — HIGH (ref 70–99)
GLUCOSE BLDC GLUCOMTR-MCNC: 359 MG/DL — HIGH (ref 70–99)
GLUCOSE BLDC GLUCOMTR-MCNC: 66 MG/DL — LOW (ref 70–99)
GLUCOSE SERPL-MCNC: 145 MG/DL — HIGH (ref 70–99)
GLUCOSE SERPL-MCNC: 153 MG/DL — HIGH (ref 70–99)
GLUCOSE SERPL-MCNC: 157 MG/DL — HIGH (ref 70–99)
GLUCOSE SERPL-MCNC: 159 MG/DL — HIGH (ref 70–99)
GLUCOSE SERPL-MCNC: 186 MG/DL — HIGH (ref 70–99)
GLUCOSE SERPL-MCNC: 268 MG/DL — HIGH (ref 70–99)
GLUCOSE SERPL-MCNC: 67 MG/DL — LOW (ref 70–99)
HBA1C BLD-MCNC: 8.2 % — HIGH (ref 4–5.6)
HCO3 BLDV-SCNC: 14 MMOL/L — LOW (ref 21–29)
HCO3 BLDV-SCNC: 20 MMOL/L — LOW (ref 21–29)
HCO3 BLDV-SCNC: 25 MMOL/L — SIGNIFICANT CHANGE UP (ref 21–29)
HCT VFR BLD CALC: 32.1 % — LOW (ref 39–50)
HGB BLD-MCNC: 10.8 G/DL — LOW (ref 13–17)
HOROWITZ INDEX BLDV+IHG-RTO: 21 — SIGNIFICANT CHANGE UP
MAGNESIUM SERPL-MCNC: 1.6 MG/DL — SIGNIFICANT CHANGE UP (ref 1.6–2.6)
MAGNESIUM SERPL-MCNC: 1.8 MG/DL — SIGNIFICANT CHANGE UP (ref 1.6–2.6)
MCHC RBC-ENTMCNC: 29.6 PG — SIGNIFICANT CHANGE UP (ref 27–34)
MCHC RBC-ENTMCNC: 33.5 GM/DL — SIGNIFICANT CHANGE UP (ref 32–36)
MCV RBC AUTO: 88.2 FL — SIGNIFICANT CHANGE UP (ref 80–100)
PCO2 BLDV: 26 MMHG — LOW (ref 35–50)
PCO2 BLDV: 32 MMHG — LOW (ref 35–50)
PCO2 BLDV: 38 MMHG — SIGNIFICANT CHANGE UP (ref 35–50)
PH BLDV: 7.36 — SIGNIFICANT CHANGE UP (ref 7.35–7.45)
PH BLDV: 7.41 — SIGNIFICANT CHANGE UP (ref 7.35–7.45)
PH BLDV: 7.44 — SIGNIFICANT CHANGE UP (ref 7.35–7.45)
PHOSPHATE SERPL-MCNC: 2.5 MG/DL — SIGNIFICANT CHANGE UP (ref 2.5–4.5)
PHOSPHATE SERPL-MCNC: 2.8 MG/DL — SIGNIFICANT CHANGE UP (ref 2.5–4.5)
PLATELET # BLD AUTO: 314 K/UL — SIGNIFICANT CHANGE UP (ref 150–400)
PO2 BLDV: 31 MMHG — SIGNIFICANT CHANGE UP (ref 25–45)
PO2 BLDV: 39 MMHG — SIGNIFICANT CHANGE UP (ref 25–45)
PO2 BLDV: 53 MMHG — HIGH (ref 25–45)
POTASSIUM SERPL-MCNC: 3 MMOL/L — LOW (ref 3.5–5.3)
POTASSIUM SERPL-MCNC: 3.2 MMOL/L — LOW (ref 3.5–5.3)
POTASSIUM SERPL-MCNC: 3.4 MMOL/L — LOW (ref 3.5–5.3)
POTASSIUM SERPL-MCNC: 3.5 MMOL/L — SIGNIFICANT CHANGE UP (ref 3.5–5.3)
POTASSIUM SERPL-MCNC: 3.8 MMOL/L — SIGNIFICANT CHANGE UP (ref 3.5–5.3)
POTASSIUM SERPL-MCNC: 3.9 MMOL/L — SIGNIFICANT CHANGE UP (ref 3.5–5.3)
POTASSIUM SERPL-MCNC: 6.6 MMOL/L — CRITICAL HIGH (ref 3.5–5.3)
POTASSIUM SERPL-SCNC: 3 MMOL/L — LOW (ref 3.5–5.3)
POTASSIUM SERPL-SCNC: 3.2 MMOL/L — LOW (ref 3.5–5.3)
POTASSIUM SERPL-SCNC: 3.4 MMOL/L — LOW (ref 3.5–5.3)
POTASSIUM SERPL-SCNC: 3.5 MMOL/L — SIGNIFICANT CHANGE UP (ref 3.5–5.3)
POTASSIUM SERPL-SCNC: 3.8 MMOL/L — SIGNIFICANT CHANGE UP (ref 3.5–5.3)
POTASSIUM SERPL-SCNC: 3.9 MMOL/L — SIGNIFICANT CHANGE UP (ref 3.5–5.3)
POTASSIUM SERPL-SCNC: 6.6 MMOL/L — CRITICAL HIGH (ref 3.5–5.3)
RBC # BLD: 3.63 M/UL — LOW (ref 4.2–5.8)
RBC # FLD: 15.7 % — HIGH (ref 10.3–14.5)
SAO2 % BLDV: 52 % — LOW (ref 67–88)
SAO2 % BLDV: 66 % — LOW (ref 67–88)
SAO2 % BLDV: 81 % — SIGNIFICANT CHANGE UP (ref 67–88)
SODIUM SERPL-SCNC: 134 MMOL/L — LOW (ref 135–145)
SODIUM SERPL-SCNC: 135 MMOL/L — SIGNIFICANT CHANGE UP (ref 135–145)
SODIUM SERPL-SCNC: 135 MMOL/L — SIGNIFICANT CHANGE UP (ref 135–145)
SODIUM SERPL-SCNC: 137 MMOL/L — SIGNIFICANT CHANGE UP (ref 135–145)
SODIUM SERPL-SCNC: 137 MMOL/L — SIGNIFICANT CHANGE UP (ref 135–145)
SPECIMEN SOURCE: SIGNIFICANT CHANGE UP
WBC # BLD: 12.5 K/UL — HIGH (ref 3.8–10.5)
WBC # FLD AUTO: 12.5 K/UL — HIGH (ref 3.8–10.5)

## 2018-06-07 PROCEDURE — 99291 CRITICAL CARE FIRST HOUR: CPT

## 2018-06-07 RX ORDER — POTASSIUM CHLORIDE 20 MEQ
40 PACKET (EA) ORAL ONCE
Qty: 0 | Refills: 0 | Status: COMPLETED | OUTPATIENT
Start: 2018-06-07 | End: 2018-06-07

## 2018-06-07 RX ORDER — INSULIN HUMAN 100 [IU]/ML
2 INJECTION, SOLUTION SUBCUTANEOUS ONCE
Qty: 0 | Refills: 0 | Status: COMPLETED | OUTPATIENT
Start: 2018-06-07 | End: 2018-06-07

## 2018-06-07 RX ORDER — DEXTROSE 50 % IN WATER 50 %
25 SYRINGE (ML) INTRAVENOUS ONCE
Qty: 0 | Refills: 0 | Status: COMPLETED | OUTPATIENT
Start: 2018-06-07 | End: 2018-06-07

## 2018-06-07 RX ORDER — INSULIN LISPRO 100/ML
VIAL (ML) SUBCUTANEOUS AT BEDTIME
Qty: 0 | Refills: 0 | Status: DISCONTINUED | OUTPATIENT
Start: 2018-06-07 | End: 2018-06-12

## 2018-06-07 RX ORDER — POTASSIUM CHLORIDE 20 MEQ
10 PACKET (EA) ORAL
Qty: 0 | Refills: 0 | Status: COMPLETED | OUTPATIENT
Start: 2018-06-07 | End: 2018-06-07

## 2018-06-07 RX ORDER — INSULIN HUMAN 100 [IU]/ML
2 INJECTION, SOLUTION SUBCUTANEOUS
Qty: 100 | Refills: 0 | Status: DISCONTINUED | OUTPATIENT
Start: 2018-06-07 | End: 2018-06-07

## 2018-06-07 RX ORDER — DEXTROSE 10 % IN WATER 10 %
1000 INTRAVENOUS SOLUTION INTRAVENOUS
Qty: 0 | Refills: 0 | Status: DISCONTINUED | OUTPATIENT
Start: 2018-06-07 | End: 2018-06-07

## 2018-06-07 RX ORDER — ALBUTEROL 90 UG/1
10 AEROSOL, METERED ORAL ONCE
Qty: 0 | Refills: 0 | Status: COMPLETED | OUTPATIENT
Start: 2018-06-07 | End: 2018-06-07

## 2018-06-07 RX ORDER — INSULIN HUMAN 100 [IU]/ML
1 INJECTION, SOLUTION SUBCUTANEOUS
Qty: 100 | Refills: 0 | Status: DISCONTINUED | OUTPATIENT
Start: 2018-06-07 | End: 2018-06-07

## 2018-06-07 RX ORDER — DEXTROSE 50 % IN WATER 50 %
12.5 SYRINGE (ML) INTRAVENOUS ONCE
Qty: 0 | Refills: 0 | Status: DISCONTINUED | OUTPATIENT
Start: 2018-06-07 | End: 2018-06-08

## 2018-06-07 RX ORDER — DEXTROSE 50 % IN WATER 50 %
25 SYRINGE (ML) INTRAVENOUS ONCE
Qty: 0 | Refills: 0 | Status: DISCONTINUED | OUTPATIENT
Start: 2018-06-07 | End: 2018-06-08

## 2018-06-07 RX ORDER — GLUCAGON INJECTION, SOLUTION 0.5 MG/.1ML
1 INJECTION, SOLUTION SUBCUTANEOUS ONCE
Qty: 0 | Refills: 0 | Status: DISCONTINUED | OUTPATIENT
Start: 2018-06-07 | End: 2018-06-08

## 2018-06-07 RX ORDER — INSULIN HUMAN 100 [IU]/ML
2 INJECTION, SOLUTION SUBCUTANEOUS ONCE
Qty: 0 | Refills: 0 | Status: DISCONTINUED | OUTPATIENT
Start: 2018-06-07 | End: 2018-06-07

## 2018-06-07 RX ORDER — DEXTROSE 50 % IN WATER 50 %
50 SYRINGE (ML) INTRAVENOUS ONCE
Qty: 0 | Refills: 0 | Status: COMPLETED | OUTPATIENT
Start: 2018-06-07 | End: 2018-06-07

## 2018-06-07 RX ORDER — INSULIN GLARGINE 100 [IU]/ML
10 INJECTION, SOLUTION SUBCUTANEOUS ONCE
Qty: 0 | Refills: 0 | Status: COMPLETED | OUTPATIENT
Start: 2018-06-07 | End: 2018-06-07

## 2018-06-07 RX ORDER — SODIUM CHLORIDE 9 MG/ML
1000 INJECTION INTRAMUSCULAR; INTRAVENOUS; SUBCUTANEOUS
Qty: 0 | Refills: 0 | Status: COMPLETED | OUTPATIENT
Start: 2018-06-07 | End: 2018-06-07

## 2018-06-07 RX ORDER — ALPRAZOLAM 0.25 MG
0.5 TABLET ORAL DAILY
Qty: 0 | Refills: 0 | Status: DISCONTINUED | OUTPATIENT
Start: 2018-06-07 | End: 2018-06-12

## 2018-06-07 RX ORDER — INSULIN GLARGINE 100 [IU]/ML
10 INJECTION, SOLUTION SUBCUTANEOUS
Qty: 0 | Refills: 0 | Status: DISCONTINUED | OUTPATIENT
Start: 2018-06-07 | End: 2018-06-08

## 2018-06-07 RX ORDER — INSULIN LISPRO 100/ML
VIAL (ML) SUBCUTANEOUS
Qty: 0 | Refills: 0 | Status: DISCONTINUED | OUTPATIENT
Start: 2018-06-07 | End: 2018-06-12

## 2018-06-07 RX ORDER — SODIUM CHLORIDE 9 MG/ML
1000 INJECTION, SOLUTION INTRAVENOUS
Qty: 0 | Refills: 0 | Status: DISCONTINUED | OUTPATIENT
Start: 2018-06-07 | End: 2018-06-12

## 2018-06-07 RX ORDER — SODIUM CHLORIDE 9 MG/ML
1000 INJECTION, SOLUTION INTRAVENOUS
Qty: 0 | Refills: 0 | Status: DISCONTINUED | OUTPATIENT
Start: 2018-06-07 | End: 2018-06-07

## 2018-06-07 RX ORDER — INSULIN HUMAN 100 [IU]/ML
3 INJECTION, SOLUTION SUBCUTANEOUS
Qty: 100 | Refills: 0 | Status: DISCONTINUED | OUTPATIENT
Start: 2018-06-07 | End: 2018-06-07

## 2018-06-07 RX ORDER — DEXTROSE 50 % IN WATER 50 %
15 SYRINGE (ML) INTRAVENOUS ONCE
Qty: 0 | Refills: 0 | Status: DISCONTINUED | OUTPATIENT
Start: 2018-06-07 | End: 2018-06-08

## 2018-06-07 RX ORDER — INSULIN HUMAN 100 [IU]/ML
10 INJECTION, SOLUTION SUBCUTANEOUS ONCE
Qty: 0 | Refills: 0 | Status: COMPLETED | OUTPATIENT
Start: 2018-06-07 | End: 2018-06-07

## 2018-06-07 RX ADMIN — Medication 75 MILLILITER(S): at 06:02

## 2018-06-07 RX ADMIN — MORPHINE SULFATE 15 MILLIGRAM(S): 50 CAPSULE, EXTENDED RELEASE ORAL at 12:53

## 2018-06-07 RX ADMIN — ALBUTEROL 10 MILLIGRAM(S): 90 AEROSOL, METERED ORAL at 13:58

## 2018-06-07 RX ADMIN — MORPHINE SULFATE 15 MILLIGRAM(S): 50 CAPSULE, EXTENDED RELEASE ORAL at 20:44

## 2018-06-07 RX ADMIN — INSULIN HUMAN 10 UNIT(S): 100 INJECTION, SOLUTION SUBCUTANEOUS at 14:03

## 2018-06-07 RX ADMIN — INSULIN GLARGINE 10 UNIT(S): 100 INJECTION, SOLUTION SUBCUTANEOUS at 15:10

## 2018-06-07 RX ADMIN — INSULIN HUMAN 1 UNIT(S)/HR: 100 INJECTION, SOLUTION SUBCUTANEOUS at 11:53

## 2018-06-07 RX ADMIN — Medication 0.5 MILLIGRAM(S): at 13:18

## 2018-06-07 RX ADMIN — MORPHINE SULFATE 15 MILLIGRAM(S): 50 CAPSULE, EXTENDED RELEASE ORAL at 21:15

## 2018-06-07 RX ADMIN — Medication 40 MILLIEQUIVALENT(S): at 06:08

## 2018-06-07 RX ADMIN — Medication 40 MILLIEQUIVALENT(S): at 18:26

## 2018-06-07 RX ADMIN — INSULIN HUMAN 2 UNIT(S): 100 INJECTION, SOLUTION SUBCUTANEOUS at 02:08

## 2018-06-07 RX ADMIN — Medication 25 MILLILITER(S): at 17:34

## 2018-06-07 RX ADMIN — Medication 100 MILLIEQUIVALENT(S): at 07:17

## 2018-06-07 RX ADMIN — Medication 50 MILLILITER(S): at 01:36

## 2018-06-07 RX ADMIN — INSULIN HUMAN 2 UNIT(S)/HR: 100 INJECTION, SOLUTION SUBCUTANEOUS at 02:08

## 2018-06-07 RX ADMIN — Medication 50 MILLILITER(S): at 14:01

## 2018-06-07 RX ADMIN — Medication 100 MILLIEQUIVALENT(S): at 04:49

## 2018-06-07 RX ADMIN — HEPARIN SODIUM 5000 UNIT(S): 5000 INJECTION INTRAVENOUS; SUBCUTANEOUS at 13:23

## 2018-06-07 RX ADMIN — Medication 100 MILLIEQUIVALENT(S): at 06:03

## 2018-06-07 RX ADMIN — CEFTRIAXONE 100 GRAM(S): 500 INJECTION, POWDER, FOR SOLUTION INTRAMUSCULAR; INTRAVENOUS at 17:30

## 2018-06-07 RX ADMIN — HEPARIN SODIUM 5000 UNIT(S): 5000 INJECTION INTRAVENOUS; SUBCUTANEOUS at 05:28

## 2018-06-07 RX ADMIN — HEPARIN SODIUM 5000 UNIT(S): 5000 INJECTION INTRAVENOUS; SUBCUTANEOUS at 22:21

## 2018-06-07 RX ADMIN — Medication 100 MILLILITER(S): at 11:50

## 2018-06-07 RX ADMIN — MORPHINE SULFATE 15 MILLIGRAM(S): 50 CAPSULE, EXTENDED RELEASE ORAL at 11:47

## 2018-06-07 RX ADMIN — SODIUM CHLORIDE 100 MILLILITER(S): 9 INJECTION INTRAMUSCULAR; INTRAVENOUS; SUBCUTANEOUS at 15:17

## 2018-06-07 NOTE — PROGRESS NOTE ADULT - ASSESSMENT
69 yo male PMHX HTN, type 2 diabetes mellitus (on Lantus), HLD, and stage 4 neuroendocrine CA (not on current chemo, finished radiation 2 weeks prior, currently awaiting trial therapy) presents to ED BIBEMS c/o weakness found to have elevated anion gap to 31, decreased bicarb, mild acidosis with BHB positive of 6.6 likely consistent with mild DKA.     Plan:    Neuro:   - Baseline dementia, appear mild confused likely metabolic encephalopathy 2/2 DKA and UTI.   - Currently AO x2    Cardiovascular:  - No acute issues.  - No pressor requirements.  - Hemodynamically stable.    Respiratory:  - No acute issues, saturating well on room air.     GI:  - Keep NPO except meds until anion gap closes.     Renal:  - No acute issues. Monitor i's and O's.   - Monitor BMP q4h.    - Add NS with 20meq KCl to replete electrolyte.    Endocrine:  - Mild DKA, likely 2/2 SGLT-1 Inhibitor use at home, with BHB elevated to 6.6 with gap in the 20's  - C/w D10 at rate of 100cc/ hour  - C/w insulin gtt, until gap close and start subq insulin   - Check FS q1H.   - Check BMP q4H  - Check HBA1C    Heme:  - Metastatic neuroendocrine tumor not on active chemo  - Heme c/s in AM   - continue outpatient management  - C/w pain control    DVT PPX:  HSQ    Edwin Horneu - PGY 1  [facility]  69 yo male PMHX HTN, type 2 diabetes mellitus (on Lantus), HLD, and stage 4 neuroendocrine CA (not on current chemo, finished radiation 2 weeks prior, currently awaiting trial therapy) presents to ED BIBEMS c/o weakness found to have elevated anion gap to 31, decreased bicarb, mild acidosis with BHB positive of 6.6 likely consistent with mild DKA.     Plan:    Neuro:   - Baseline dementia, appear mild confused likely metabolic encephalopathy 2/2 DKA and UTI.   - Currently AO x2    Cardiovascular:  - No acute issues.  - No pressor requirements.  - Hemodynamically stable.    Respiratory:  - No acute issues, saturating well on room air.     GI:  - Keep NPO except meds until anion gap closes.     Renal:  - No acute issues. Monitor i's and O's.   - Monitor BMP q4h.    - Add NS with 20meq KCl to replete electrolyte.    Endocrine:  - Mild DKA, likely 2/2 SGLT-1 Inhibitor use at home, with BHB elevated to 6.6 with gap in the 20's  - C/w D10 at rate of 100cc/ hour  - C/w insulin gtt, until gap close and start subq insulin   - Check FS q1H.   - Check BMP q4H  - Check HBA1C    Heme:  - Metastatic neuroendocrine tumor not on active chemo  - Heme c/s in AM   - continue outpatient management  - C/w pain control      ID:  - Positive UA with large LE and few bacteria  - Will continue treat with ceftriaxone 1g qd (3-5 days)        DVT PPX:  HSQ    He Rubina - PGY 1  Internal Medicine - MICU -

## 2018-06-07 NOTE — PROGRESS NOTE ADULT - SUBJECTIVE AND OBJECTIVE BOX
Edwin Cespedes, PGY1  Pager [phone]    Summary:  67 yo male PMHX HTN, type 2 diabetes mellitus (on Lantus), HLD, and stage 4 neuroendocrine CA (not on current chemo, finished radiation 2 weeks prior, currently awaiting trial therapy) presents to ED BIBEMS c/o weakness found to have elevated anion gap to 31, decreased bicarb, mild acidosis with BHB positive of 6.6 likely consistent with mild DKA.      INTERVAL HPI/OVERNIGHT EVENTS:  Seen and examined patient at bedside. Patient with no complaint at this time.       CONSTITUTIONAL: No fevers or chills  EYES/ENT: No visual changes;  No vertigo or throat pain   RESPIRATORY: No cough, No shortness of breath  CARDIOVASCULAR: No chest pain or palpitations  GASTROINTESTINAL: No abdominal or epigastric pain. No nausea, vomiting    OBJECTIVE:  VITAL SIGNS:  ICU Vital Signs Last 24 Hrs  T(C): 37.4 (2018 08:00), Max: 37.4 (2018 22:30)  T(F): 99.4 (2018 08:00), Max: 99.4 (2018 08:00)  HR: 116 (2018 10:00) (111 - 135)  BP: 131/62 (2018 10:00) (117/63 - 137/81)  BP(mean): 89 (2018 10:00) (84 - 102)  ABP: --  ABP(mean): --  RR: 27 (2018 10:00) (18 - 32)  SpO2: 99% (2018 10:00) (95% - 99%)         @ : @ 07:00  --------------------------------------------------------  IN: 1438 mL / OUT: 1275 mL / NET: 163 mL     @ 07: @ 10:27  --------------------------------------------------------  IN: 378 mL / OUT: 325 mL / NET: 53 mL      CAPILLARY BLOOD GLUCOSE      POCT Blood Glucose.: 168 mg/dL (2018 10:02)      PHYSICAL EXAM:  General: NAD, resting comfortably in bed  HEENT: NC/AT; normal conjunctiva  Respiratory: CTA b/l, no wheezing  Cardiovascular: +S1/S2; RRR  Abdomen: soft, NT/ND; +BS x4  Extremities: WWP, 2+ peripheral pulses b/l; no LE edema  Skin: normal color and turgor; no rash  Neurological: no focal deficit.    MEDICATIONS:  MEDICATIONS  (STANDING):  cefTRIAXone   IVPB 1 Gram(s) IV Intermittent every 24 hours  dextrose 10%. 1000 milliLiter(s) (100 mL/Hr) IV Continuous <Continuous>  heparin  Injectable 5000 Unit(s) SubCutaneous every 8 hours  insulin Infusion 1 Unit(s)/Hr (1 mL/Hr) IV Continuous <Continuous>  sodium chloride 0.9% 1000 milliLiter(s) (100 mL/Hr) IV Continuous <Continuous>    MEDICATIONS  (PRN):  morphine  IR 15 milliGRAM(s) Oral every 4 hours PRN Severe Pain (7 - 10)      ALLERGIES:  Allergies    No Known Allergies    Intolerances        LABS:                        10.8   12.5  )-----------( 314      ( 2018 00:23 )             32.1     -    137  |  98  |  7   ----------------------------<  157<H>  3.8   |  20<L>  |  0.43<L>    Ca    8.7      2018 08:30  Phos  2.8       Mg     1.8         TPro  7.8  /  Alb  2.7<L>  /  TBili  0.7  /  DBili  x   /  AST  94<H>  /  ALT  24  /  AlkPhos  679<H>  -    PT/INR - ( 2018 13:19 )   PT: 15.9 sec;   INR: 1.45 ratio         PTT - ( 2018 13:19 )  PTT:31.3 sec  Urinalysis Basic - ( 2018 15:19 )    Color: Yellow / Appearance: Clear / S.024 / pH: x  Gluc: x / Ketone: Large  / Bili: Negative / Urobili: Negative   Blood: x / Protein: Trace / Nitrite: Negative   Leuk Esterase: Large / RBC: 0-2 /HPF / WBC 3-5 /HPF   Sq Epi: x / Non Sq Epi: OCC /HPF / Bacteria: Few /HPF        RADIOLOGY & ADDITIONAL TESTS: Reviewed.

## 2018-06-07 NOTE — CHART NOTE - NSCHARTNOTEFT_GEN_A_CORE
MICU Transfer Note    Transfer from: MICU  Transfer to:  (X) Medicine    (  ) Telemetry    (  ) RCU    (  ) Palliative    (  ) Stroke Unit       Accepting physican:    HPI:  67 yo Dominican speaking male PMHX HTN, type 2 diabetes mellitus (on Lantus and Invokana), HLD, and stage 4 neuroendocrine CA (not on current chemo, finished radiation 2 weeks prior, currently awaiting trial therapy) presents to ED BIBEMS c/o weakness. Daughter at bedside provides most of history per patient's request. Patient today had a pre-syncopal episode after eating, however never lost consciousness or had trauma. Patient was able to get up and ambulate afterwards. Patient has baseline dizziness. Patient takes 20U Lantus at bedtime but has missed doses in the past couple of days since he assumed he should not be administering it if he is not eating that much. Patient last took Invokana yesterday morning. Since patient's last radiation therapy in May, patient has had decreased appetite, and has only eaten one meal per day. Patient feels hungry but has early satiety per daughter. Patient has also had increased frequency of urinary at home, but without dysuria. Of note patient had CT scan on 5/30 showing metastasis to liver and new nodule in lung suggestive of metastasis. Daughter called Dr. Yari Dick pt's oncologist at [facility] and she advised them to come to ED for eval. Patient currently denies any complaints but he is alert and oriented to person and birthdate and not to location. Per daughter he is feeling very tired, and is a little altered from baseline. He has known tachycardia 2/2 anxiety as he does not like the hospital setting. Otherwise, no history of heart failure, fevers, chills, nausea, vomiting or diarrhea. Patient hemodynamically stable. In the ER, recevied Ceftriaxone, Humalog 6U, Ativan, 2L fluids (06 Jun 2018 22:17)    MICU COURSE:  Patient was started on D10 and insulin gtt for DKA with FS q1h and BMP         ASSESSMENT & PLAN:   67 yo male PMHX HTN, type 2 diabetes mellitus (on Lantus), HLD, and stage 4 neuroendocrine CA (not on current chemo, finished radiation 2 weeks prior, currently awaiting trial therapy) presents to ED BIBEMS c/o weakness found to have elevated anion gap to 31, decreased bicarb, mild acidosis with BHB positive of 6.6 likely consistent with mild DKA.     Plan:    Neuro:   - Baseline dementia, appear mild confused likely metabolic encephalopathy 2/2 DKA and UTI.   - Currently AO x2    Cardiovascular:  - No acute issues.  - No pressor requirements.  - Hemodynamically stable.    Respiratory:  - No acute issues, saturating well on room air.     GI:  - Keep NPO except meds until anion gap closes.     Renal:  - No acute issues. Monitor i's and O's.   - Monitor BMP q4h.    - Add NS with 20meq KCl to replete electrolyte.    Endocrine:  - Mild DKA, likely 2/2 SGLT-1 Inhibitor use at home, with BHB elevated to 6.6 with gap in the 20's  - C/w D10 at rate of 100cc/ hour  - C/w insulin gtt, until gap close and start subq insulin   - Check FS q1H.   - Check BMP q4H  - Check HBA1C    Heme:  - Metastatic neuroendocrine tumor not on active chemo  - Heme c/s in AM   - continue outpatient management  - C/w pain control    DVT PPX:  HSQ        For Follow-Up:  [ ]       Edwin Cespedes - PGY 1  Internal Medicine - MICU -  MICU Transfer Note    Transfer from: MICU  Transfer to:  (X) Medicine    (  ) Telemetry    (  ) RCU    (  ) Palliative    (  ) Stroke Unit       Accepting physican:    HPI:  67 yo Yi speaking male PMHX HTN, type 2 diabetes mellitus (on Lantus and Invokana), HLD, and stage 4 neuroendocrine CA (not on current chemo, finished radiation 2 weeks prior, currently awaiting trial therapy) presents to ED BIBEMS c/o weakness. Daughter at bedside provides most of history per patient's request. Patient today had a pre-syncopal episode after eating, however never lost consciousness or had trauma. Patient was able to get up and ambulate afterwards. Patient has baseline dizziness. Patient takes 20U Lantus at bedtime but has missed doses in the past couple of days since he assumed he should not be administering it if he is not eating that much. Patient last took Invokana yesterday morning. Since patient's last radiation therapy in May, patient has had decreased appetite, and has only eaten one meal per day. Patient feels hungry but has early satiety per daughter. Patient has also had increased frequency of urinary at home, but without dysuria. Of note patient had CT scan on 5/30 showing metastasis to liver and new nodule in lung suggestive of metastasis. Daughter called Dr. Yari Dick pt's oncologist at [facility] and she advised them to come to ED for eval. Patient currently denies any complaints but he is alert and oriented to person and birthdate and not to location. Per daughter he is feeling very tired, and is a little altered from baseline. He has known tachycardia 2/2 anxiety as he does not like the hospital setting. Otherwise, no history of heart failure, fevers, chills, nausea, vomiting or diarrhea. Patient hemodynamically stable. In the ER, recevied Ceftriaxone, Humalog 6U, Ativan, 2L fluids (06 Jun 2018 22:17)    MICU COURSE:  Patient was started on D10 and insulin gtt for DKA with FS q1h and BMP q4h. Patient was noted to have positive UA and was treated empirically with ceftriaxone for UTI. Patient's electrolytes was repleted. His anion gap eventually was closed and he was started on Lantus 10U overlaping insulin gtt and started on diet. Patient was noted to have K of 6.6 and received insulin/D50 and albuterol. EKG without significant changes. Repeat BMp with resolution of hyperkalemia. Insulin gtt was d/c'ed and he was started on humalog ___U. Patient was started on low dose xanax for his anxiety as patient was chronically taking xanax at home. Patient was deemed medically stable to be transferred to medical floor for further management.         ASSESSMENT & PLAN:   67 yo male PMHX HTN, type 2 diabetes mellitus (on Lantus), HLD, and stage 4 neuroendocrine CA (not on current chemo, finished radiation 2 weeks prior, currently awaiting trial therapy) presents to ED BIBEMS c/o weakness found to have elevated anion gap to 31, decreased bicarb, mild acidosis with BHB positive of 6.6 likely consistent with mild DKA.     Plan:    Neuro:   - Baseline dementia, appear mild confused likely metabolic encephalopathy 2/2 DKA and UTI.   - Currently AO x2    Cardiovascular:  - No acute issues.  - No pressor requirements.  - Hemodynamically stable.    Respiratory:  - No acute issues, saturating well on room air.     GI:  - Keep NPO except meds until anion gap closes.     Renal:  - No acute issues. Monitor i's and O's.   - Monitor BMP q4h.    - Add NS with 20meq KCl to replete electrolyte.    Endocrine:  - Mild DKA, likely 2/2 SGLT-1 Inhibitor use at home, with BHB elevated to 6.6 with gap in the 20's  - C/w D10 at rate of 100cc/ hour  - C/w insulin gtt, until gap close and start subq insulin   - Check FS q1H.   - Check BMP q4H  - Check HBA1C    Heme:  - Metastatic neuroendocrine tumor not on active chemo  - Heme c/s in AM   - continue outpatient management  - C/w pain control    ID:  - Positive UA with large LE and few bacteria  - Will continue treat with ceftriaxone 1g qd (3-5 days)      DVT PPX:  HSQ        For Follow-Up:  [ ] Monitor for insulin requirement and titrate regimen accordingly  [ ] Diabetes education  [ ] Continue with ceftriaxone (3-5 days course)  [ ] C/w pain medication for metastatic neuroendocrine tumor        He Rubina - PGY 1  Internal Medicine - MICU -  MICU Transfer Note    Transfer from: MICU  Transfer to:  (X) Medicine    (  ) Telemetry    (  ) RCU    (  ) Palliative    (  ) Stroke Unit       Accepting physican: SELVIN Alberto    HPI:  69 yo Kiswahili speaking male PMHX HTN, type 2 diabetes mellitus (on Lantus and Invokana), HLD, and stage 4 neuroendocrine CA (not on current chemo, finished radiation 2 weeks prior, currently awaiting trial therapy) presents to ED BIBEMS c/o weakness. Daughter at bedside provides most of history per patient's request. Patient today had a pre-syncopal episode after eating, however never lost consciousness or had trauma. Patient was able to get up and ambulate afterwards. Patient has baseline dizziness. Patient takes 20U Lantus at bedtime but has missed doses in the past couple of days since he assumed he should not be administering it if he is not eating that much. Patient last took Invokana yesterday morning. Since patient's last radiation therapy in May, patient has had decreased appetite, and has only eaten one meal per day. Patient feels hungry but has early satiety per daughter. Patient has also had increased frequency of urinary at home, but without dysuria. Of note patient had CT scan on 5/30 showing metastasis to liver and new nodule in lung suggestive of metastasis. Daughter called Dr. Yari Dick pt's oncologist at [facility] and she advised them to come to ED for eval. Patient currently denies any complaints but he is alert and oriented to person and birthdate and not to location. Per daughter he is feeling very tired, and is a little altered from baseline. He has known tachycardia 2/2 anxiety as he does not like the hospital setting. Otherwise, no history of heart failure, fevers, chills, nausea, vomiting or diarrhea. Patient hemodynamically stable. In the ER, recevied Ceftriaxone, Humalog 6U, Ativan, 2L fluids (06 Jun 2018 22:17)    MICU COURSE:  Patient was started on D10 and insulin gtt for DKA with FS q1h and BMP q4h. Patient was noted to have positive UA and was treated empirically with ceftriaxone for UTI. Patient's electrolytes was repleted. His anion gap eventually was closed and he was started on Lantus 10U overlaping insulin gtt and started on diet. Patient was noted to have K of 6.6 and received insulin/D50 and albuterol. EKG without significant changes. Repeat BMp with resolution of hyperkalemia. Insulin gtt was d/c'ed and he was started on humalog ___U. Patient was started on low dose xanax for his anxiety as patient was chronically taking xanax at home. Patient was deemed medically stable to be transferred to medical floor for further management.         ASSESSMENT & PLAN:   69 yo male PMHX HTN, type 2 diabetes mellitus (on Lantus), HLD, and stage 4 neuroendocrine CA (not on current chemo, finished radiation 2 weeks prior, currently awaiting trial therapy) presents to ED BIBEMS c/o weakness found to have elevated anion gap to 31, decreased bicarb, mild acidosis with BHB positive of 6.6 likely consistent with mild DKA.     Plan:    Neuro:   - Baseline dementia, appear mild confused likely metabolic encephalopathy 2/2 DKA and UTI.   - Currently AO x2    Cardiovascular:  - No acute issues.  - No pressor requirements.  - Hemodynamically stable.    Respiratory:  - No acute issues, saturating well on room air.     GI:  - Keep NPO except meds until anion gap closes.     Renal:  - No acute issues. Monitor i's and O's.   - Monitor BMP q4h.    - Add NS with 20meq KCl to replete electrolyte.    Endocrine:  - Mild DKA, likely 2/2 SGLT-1 Inhibitor use at home, with BHB elevated to 6.6 with gap in the 20's  - C/w D10 at rate of 100cc/ hour  - C/w insulin gtt, until gap close and start subq insulin   - Check FS q1H.   - Check BMP q4H  - Check HBA1C    Heme:  - Metastatic neuroendocrine tumor not on active chemo  - Heme c/s in AM   - continue outpatient management  - C/w pain control    ID:  - Positive UA with large LE and few bacteria  - Will continue treat with ceftriaxone 1g qd (3-5 days)      DVT PPX:  HSQ        For Follow-Up:  [ ] Monitor for insulin requirement and titrate regimen accordingly  [ ] Diabetes education  [ ] Continue with ceftriaxone (3-5 days course)  [ ] C/w pain medication for metastatic neuroendocrine tumor        He Rubina - PGY 1  Internal Medicine - MICU -  MICU Transfer Note    Transfer from: MICU  Transfer to:  (X) Medicine    (  ) Telemetry    (  ) RCU    (  ) Palliative    (  ) Stroke Unit       Accepting physican: SELVIN Alberto    HPI:  69 yo English speaking male PMHX HTN, type 2 diabetes mellitus (on Lantus and Invokana), HLD, and stage 4 neuroendocrine CA (not on current chemo, finished radiation 2 weeks prior, currently awaiting trial therapy) presents to ED BIBEMS c/o weakness. Daughter at bedside provides most of history per patient's request. Patient today had a pre-syncopal episode after eating, however never lost consciousness or had trauma. Patient was able to get up and ambulate afterwards. Patient has baseline dizziness. Patient takes 20U Lantus at bedtime but has missed doses in the past couple of days since he assumed he should not be administering it if he is not eating that much. Patient last took Invokana yesterday morning. Since patient's last radiation therapy in May, patient has had decreased appetite, and has only eaten one meal per day. Patient feels hungry but has early satiety per daughter. Patient has also had increased frequency of urinary at home, but without dysuria. Of note patient had CT scan on 5/30 showing metastasis to liver and new nodule in lung suggestive of metastasis. Daughter called Dr. Yari Dick pt's oncologist at [facility] and she advised them to come to ED for eval. Patient currently denies any complaints but he is alert and oriented to person and birthdate and not to location. Per daughter he is feeling very tired, and is a little altered from baseline. He has known tachycardia 2/2 anxiety as he does not like the hospital setting. Otherwise, no history of heart failure, fevers, chills, nausea, vomiting or diarrhea. Patient hemodynamically stable. In the ER, recevied Ceftriaxone, Humalog 6U, Ativan, 2L fluids (06 Jun 2018 22:17)    MICU COURSE:  Patient was started on D10 and insulin gtt for DKA with FS q1h and BMP q4h. Patient was noted to have positive UA and was treated empirically with ceftriaxone for UTI. Patient's electrolytes was repleted. His anion gap eventually was closed and he was started on Lantus 10U overlaping insulin gtt and started on diet. Patient was noted to have K of 6.6 and received insulin/D50 and albuterol. EKG without significant changes. Repeat BMp with resolution of hyperkalemia. Insulin gtt was d/c'ed. Patient was started on low dose xanax for his anxiety as patient was chronically taking xanax at home. Patient was deemed medically stable to be transferred to medical floor for further management.         ASSESSMENT & PLAN:   69 yo male PMHX HTN, type 2 diabetes mellitus (on Lantus), HLD, and stage 4 neuroendocrine CA (not on current chemo, finished radiation 2 weeks prior, currently awaiting trial therapy) presents to ED BIBEMS c/o weakness found to have elevated anion gap to 31, decreased bicarb, mild acidosis with BHB positive of 6.6 likely consistent with mild DKA.     Plan:    Neuro:   - Baseline dementia, appear mild confused likely metabolic encephalopathy 2/2 DKA and UTI.   - Currently AO x2    Cardiovascular:  - No acute issues.  - No pressor requirements.  - Hemodynamically stable.    Respiratory:  - No acute issues, saturating well on room air.     GI:  - Keep NPO except meds until anion gap closes.     Renal:  - No acute issues. Monitor i's and O's.   - Monitor BMP q4h.    - Add NS with 20meq KCl to replete electrolyte.    Endocrine:  - Mild DKA, likely 2/2 SGLT-1 Inhibitor use at home, with BHB elevated to 6.6 with gap in the 20's  - C/w D10 at rate of 100cc/ hour  - C/w insulin gtt, until gap close and start subq insulin   - Check FS q1H.   - Check BMP q4H  - Check HBA1C    Heme:  - Metastatic neuroendocrine tumor not on active chemo  - Heme c/s in AM   - continue outpatient management  - C/w pain control    ID:  - Positive UA with large LE and few bacteria  - Will continue treat with ceftriaxone 1g qd (3-5 days)      DVT PPX:  HSQ        For Follow-Up:  [ ] Monitor for insulin requirement and titrate regimen accordingly  [ ] Diabetes education  [ ] Continue with ceftriaxone (3-5 days course)  [ ] C/w pain medication for metastatic neuroendocrine tumor        He Rubina - PGY 1  Internal Medicine - MICU -

## 2018-06-08 DIAGNOSIS — D64.9 ANEMIA, UNSPECIFIED: ICD-10-CM

## 2018-06-08 DIAGNOSIS — E11.10 TYPE 2 DIABETES MELLITUS WITH KETOACIDOSIS WITHOUT COMA: ICD-10-CM

## 2018-06-08 DIAGNOSIS — R82.90 UNSPECIFIED ABNORMAL FINDINGS IN URINE: ICD-10-CM

## 2018-06-08 DIAGNOSIS — E11.42 TYPE 2 DIABETES MELLITUS WITH DIABETIC POLYNEUROPATHY: ICD-10-CM

## 2018-06-08 DIAGNOSIS — E78.5 HYPERLIPIDEMIA, UNSPECIFIED: ICD-10-CM

## 2018-06-08 DIAGNOSIS — F41.9 ANXIETY DISORDER, UNSPECIFIED: ICD-10-CM

## 2018-06-08 DIAGNOSIS — I10 ESSENTIAL (PRIMARY) HYPERTENSION: ICD-10-CM

## 2018-06-08 DIAGNOSIS — D3A.8 OTHER BENIGN NEUROENDOCRINE TUMORS: ICD-10-CM

## 2018-06-08 LAB
ANION GAP SERPL CALC-SCNC: 11 MMOL/L — SIGNIFICANT CHANGE UP (ref 5–17)
BUN SERPL-MCNC: 6 MG/DL — LOW (ref 7–23)
CALCIUM SERPL-MCNC: 8.7 MG/DL — SIGNIFICANT CHANGE UP (ref 8.4–10.5)
CHLORIDE SERPL-SCNC: 101 MMOL/L — SIGNIFICANT CHANGE UP (ref 96–108)
CO2 SERPL-SCNC: 23 MMOL/L — SIGNIFICANT CHANGE UP (ref 22–31)
CREAT SERPL-MCNC: 0.39 MG/DL — LOW (ref 0.5–1.3)
GLUCOSE BLDC GLUCOMTR-MCNC: 111 MG/DL — HIGH (ref 70–99)
GLUCOSE BLDC GLUCOMTR-MCNC: 118 MG/DL — HIGH (ref 70–99)
GLUCOSE BLDC GLUCOMTR-MCNC: 149 MG/DL — HIGH (ref 70–99)
GLUCOSE BLDC GLUCOMTR-MCNC: 155 MG/DL — HIGH (ref 70–99)
GLUCOSE BLDC GLUCOMTR-MCNC: 72 MG/DL — SIGNIFICANT CHANGE UP (ref 70–99)
GLUCOSE BLDC GLUCOMTR-MCNC: 88 MG/DL — SIGNIFICANT CHANGE UP (ref 70–99)
GLUCOSE SERPL-MCNC: 113 MG/DL — HIGH (ref 70–99)
HCT VFR BLD CALC: 30.5 % — LOW (ref 39–50)
HGB BLD-MCNC: 10 G/DL — LOW (ref 13–17)
MAGNESIUM SERPL-MCNC: 1.7 MG/DL — SIGNIFICANT CHANGE UP (ref 1.6–2.6)
MCHC RBC-ENTMCNC: 28.5 PG — SIGNIFICANT CHANGE UP (ref 27–34)
MCHC RBC-ENTMCNC: 32.9 GM/DL — SIGNIFICANT CHANGE UP (ref 32–36)
MCV RBC AUTO: 86.5 FL — SIGNIFICANT CHANGE UP (ref 80–100)
PHOSPHATE SERPL-MCNC: 2.5 MG/DL — SIGNIFICANT CHANGE UP (ref 2.5–4.5)
PLATELET # BLD AUTO: 278 K/UL — SIGNIFICANT CHANGE UP (ref 150–400)
POTASSIUM SERPL-MCNC: 3.4 MMOL/L — LOW (ref 3.5–5.3)
POTASSIUM SERPL-SCNC: 3.4 MMOL/L — LOW (ref 3.5–5.3)
RBC # BLD: 3.53 M/UL — LOW (ref 4.2–5.8)
RBC # FLD: 15.8 % — HIGH (ref 10.3–14.5)
SODIUM SERPL-SCNC: 135 MMOL/L — SIGNIFICANT CHANGE UP (ref 135–145)
WBC # BLD: 9.4 K/UL — SIGNIFICANT CHANGE UP (ref 3.8–10.5)
WBC # FLD AUTO: 9.4 K/UL — SIGNIFICANT CHANGE UP (ref 3.8–10.5)

## 2018-06-08 PROCEDURE — 99233 SBSQ HOSP IP/OBS HIGH 50: CPT

## 2018-06-08 PROCEDURE — 99223 1ST HOSP IP/OBS HIGH 75: CPT | Mod: GC

## 2018-06-08 PROCEDURE — 99233 SBSQ HOSP IP/OBS HIGH 50: CPT | Mod: GC

## 2018-06-08 RX ORDER — INSULIN GLARGINE 100 [IU]/ML
8 INJECTION, SOLUTION SUBCUTANEOUS
Qty: 0 | Refills: 0 | Status: DISCONTINUED | OUTPATIENT
Start: 2018-06-08 | End: 2018-06-12

## 2018-06-08 RX ORDER — POTASSIUM CHLORIDE 20 MEQ
40 PACKET (EA) ORAL ONCE
Qty: 0 | Refills: 0 | Status: COMPLETED | OUTPATIENT
Start: 2018-06-08 | End: 2018-06-08

## 2018-06-08 RX ORDER — POTASSIUM CHLORIDE 20 MEQ
40 PACKET (EA) ORAL ONCE
Qty: 0 | Refills: 0 | Status: DISCONTINUED | OUTPATIENT
Start: 2018-06-08 | End: 2018-06-08

## 2018-06-08 RX ORDER — INSULIN GLARGINE 100 [IU]/ML
7 INJECTION, SOLUTION SUBCUTANEOUS ONCE
Qty: 0 | Refills: 0 | Status: COMPLETED | OUTPATIENT
Start: 2018-06-08 | End: 2018-06-08

## 2018-06-08 RX ADMIN — MORPHINE SULFATE 15 MILLIGRAM(S): 50 CAPSULE, EXTENDED RELEASE ORAL at 09:18

## 2018-06-08 RX ADMIN — INSULIN GLARGINE 7 UNIT(S): 100 INJECTION, SOLUTION SUBCUTANEOUS at 16:04

## 2018-06-08 RX ADMIN — HEPARIN SODIUM 5000 UNIT(S): 5000 INJECTION INTRAVENOUS; SUBCUTANEOUS at 13:33

## 2018-06-08 RX ADMIN — MORPHINE SULFATE 15 MILLIGRAM(S): 50 CAPSULE, EXTENDED RELEASE ORAL at 20:37

## 2018-06-08 RX ADMIN — MORPHINE SULFATE 15 MILLIGRAM(S): 50 CAPSULE, EXTENDED RELEASE ORAL at 08:31

## 2018-06-08 RX ADMIN — MORPHINE SULFATE 15 MILLIGRAM(S): 50 CAPSULE, EXTENDED RELEASE ORAL at 21:46

## 2018-06-08 RX ADMIN — HEPARIN SODIUM 5000 UNIT(S): 5000 INJECTION INTRAVENOUS; SUBCUTANEOUS at 06:00

## 2018-06-08 RX ADMIN — Medication 40 MILLIEQUIVALENT(S): at 02:56

## 2018-06-08 RX ADMIN — CEFTRIAXONE 100 GRAM(S): 500 INJECTION, POWDER, FOR SOLUTION INTRAMUSCULAR; INTRAVENOUS at 17:06

## 2018-06-08 RX ADMIN — HEPARIN SODIUM 5000 UNIT(S): 5000 INJECTION INTRAVENOUS; SUBCUTANEOUS at 22:03

## 2018-06-08 NOTE — CONSULT NOTE ADULT - PROBLEM SELECTOR RECOMMENDATION 3
- BP at goal, less than 130/80  - c/w current management
Pt's home regimen includes 20U lantus at bedtime and Invokana. Patient is intermittently noncompliant with these medications, making him high risk of DKA   Check Hba1C  endocrine c/s in AM

## 2018-06-08 NOTE — CONSULT NOTE ADULT - PROBLEM SELECTOR RECOMMENDATION 9
- patient - patient presented with DKA in setting of missing Lantus dose, however he had euglycemic DKA on admission, which can occur in the setting of SGLT2 inhibitor use  - recommend decrease Lantus to 8 units at 3 pm daily  - c/w low correction scale qac and qhs only as patient with minimal po intake  - consistent carb diet  - will follow

## 2018-06-08 NOTE — PROGRESS NOTE ADULT - PROBLEM SELECTOR PLAN 2
Now resolved with AG of 11, BHB of 0.4, serum Bicarb of 23, serum Na = 135   Patient is currently on  Lantus 10U w/ ISS, Patient was placed on CHO consistent diet on 6/7 and will continue to monitor blood glucose   Patient has seen the Dietitian   Patient might need outpatient PCP close monitoring and might need assistance at home with his medication administration Now resolved with AG of 11, BHB of 0.4, serum Bicarb of 23, serum Na = 135   Patient is currently on  Lantus 10U w/ ISS, Patient was placed on CHO consistent diet on 6/7 and will continue to monitor blood glucose   Endocrine consult was called  Patient has seen the Dietitian   Patient might need outpatient  close monitoring and might need assistance at home with his medication administration  Patient might need outpatient close monitoring and I spoke with Patient's daughter who states that she helps with administration with his medications  PT consult Now resolved with AG of 11, BHB of 0.4, serum Bicarb of 23, serum Na = 135   Patient is currently on  Lantus 10U w/ ISS, Patient was placed on CHO consistent diet on 6/7 and will continue to monitor blood glucose / repeat blood glucose before Lantus administration   Endocrine consult was called  Patient has seen the Dietitian   Patient might need outpatient  close monitoring and might need assistance at home with his medication administration  Patient might need outpatient close monitoring and I spoke with Patient's daughter who states that she helps with administration with his medications  PT consult

## 2018-06-08 NOTE — CONSULT NOTE ADULT - ATTENDING COMMENTS
1. DKA while on Invokana accounting for glucose 112.      Please see H& P for full note.
Agree with assessment and plan as above by Dr. Olivera. Reviewed all pertinent labs, glucose values, and imaging studies. Modifications made as indicated above.     Harris Andersen D.O  [phone]

## 2018-06-08 NOTE — CONSULT NOTE ADULT - PROBLEM SELECTOR RECOMMENDATION 2
Increased urinary frequency, found to be UA +, common side effect of SGLT-2 inhibitors  continue with antibiotics with ceftriaxone  Follow up urine and blood cultures  monitor for fevers
- recommend decrease Lantus to 8 units at 3 pm daily  - c/w low correction scale only  - consistent carb diet  - will follow  - for discharge: recommend stop Invokana and Metformin. Avoid sulfonylurea use given risk of hypoglycemia and reduced po intake. Will not use a GLP1 as patient has a BMI of 15.9. C/w Lantus, dose to be determined and start Januvia 100 mg daily. May need Prandin with meals. Goal HbA1c is 8.

## 2018-06-08 NOTE — PROGRESS NOTE ADULT - NSHPATTENDINGPLANDISCUSS_GEN_ALL_CORE
Dr Kapil More ( oncologist) Dr Kapil More ( oncologist), and NP Dr Kapil More ( oncologist),  NP, and patient's daughter( Yari)

## 2018-06-08 NOTE — CONSULT NOTE ADULT - SUBJECTIVE AND OBJECTIVE BOX
HPI:  67 y/o M PMH HTN, uncontrolled DM2 with neuropathy,  HLD, and stage 4 neuroendocrine CA (not on current chemo, finished radiation 2 weeks prior, currently awaiting trial therapy) weakness, found to have DKA on admission and admitted to the MICU for management of DKA. He was on an insulin gtt and transitioned to Lantus.    Endocrine History:  Patient states that he was diagnosed with DM2 years ago. Takes Lantus 15 units at bedtime, Invokana, and Metformin. Reports taking his medications. Does not check FS at home. Has not seen optho in years and does not know if he has retinopathy. Has neuropathy in the feet. No history of nephropathy. Drinks juice, no soda. Has blurry vision, polyuria, polydipsia. Not taking any po at this time. Reports some nausea, but does not have abdominal pain, vomiting. HbA1c here is 8.2. Reports recent weight loss.    Of note, patient has metastatic high grade neuroendocrine tumor of unknown origin, suspected to be upper GI in origin, with bone and liver involvement. Recently underwent chemo and has undergone RT to the spine. May attempt treatment with immunotherapy. Follows at [facility].     PAST MEDICAL & SURGICAL HISTORY:  Hyperlipidemia  Diabetes  HTN (hypertension)  No significant past surgical history      FAMILY HISTORY:  DM2 in both parents      Social History:  No cigarette or alcohol use  Lives with wife and children    Outpatient Medications:  · 	Invokamet:  orally   · 	morphine 15 mg oral tablet: 1 tab(s) orally every 4 hours, As Needed  · 	Zofran 8 mg oral tablet: 1 tab(s) orally 3 times a day, As Needed  · 	Reglan 10 mg oral tablet: 1 tab(s) orally 4 times a day (before meals and at bedtime), As Needed  · 	Lantus: 10 unit(s) subcutaneous once a day (at bedtime)  · 	ALPRAZolam 0.5 mg oral tablet: orally 4 times a day  · 	MiraLax oral powder for reconstitution: orally once a day, As Needed - for constipation  · 	docusate sodium 100 mg oral capsule: 1 cap(s) orally 2 times a day    MEDICATIONS  (STANDING):  cefTRIAXone   IVPB 1 Gram(s) IV Intermittent every 24 hours  dextrose 5%. 1000 milliLiter(s) (50 mL/Hr) IV Continuous <Continuous>  heparin  Injectable 5000 Unit(s) SubCutaneous every 8 hours  insulin glargine Injectable (LANTUS) 10 Unit(s) SubCutaneous <User Schedule>  insulin glargine Injectable (LANTUS) 7 Unit(s) SubCutaneous once  insulin lispro (HumaLOG) corrective regimen sliding scale   SubCutaneous three times a day before meals  insulin lispro (HumaLOG) corrective regimen sliding scale   SubCutaneous at bedtime    MEDICATIONS  (PRN):  ALPRAZolam 0.5 milliGRAM(s) Oral daily PRN anxiety  morphine  IR 15 milliGRAM(s) Oral every 4 hours PRN Severe Pain (7 - 10)      Allergies  No Known Allergies      Review of Systems:  Constitutional: No fever  Eyes: + blurry vision  Neuro: No tremors  HEENT: No pain  Cardiovascular: No chest pain, palpitations  Respiratory: No SOB, no cough  GI: + nausea; vomiting, abdominal pain  : No dysuria  Skin: no rash  Endocrine: + polyuria, polydipsia    ALL OTHER SYSTEMS REVIEWED AND NEGATIVE      PHYSICAL EXAM:  VITALS: T(C): 36.8 (06-08-18 @ 14:36)  T(F): 98.3 (06-08-18 @ 14:36), Max: 98.9 (06-07-18 @ 20:00)  HR: 101 (06-08-18 @ 14:36) (101 - 122)  BP: 120/68 (06-08-18 @ 14:36) (114/66 - 145/81)  RR:  (15 - 29)  SpO2:  (96% - 100%)  Wt(kg): --  GENERAL: NAD, cachetic   EYES: No proptosis, anicteric  HEENT:  Atraumatic, Normocephalic, dry mucous membranes  RESPIRATORY: Clear to auscultation bilaterally; No rales, rhonchi, wheezing  CARDIOVASCULAR: Regular rate and rhythm; No murmurs; no peripheral edema  GI: Soft, nontender, non distended, normal bowel sounds  SKIN: Dry, intact, No ulcers or wounds on feet  PSYCH: Alert and oriented x 3, reactive affect      POCT Blood Glucose.: 155 mg/dL (06-08-18 @ 15:16)  POCT Blood Glucose.: 72 mg/dL (06-08-18 @ 12:10)  POCT Blood Glucose.: 88 mg/dL (06-08-18 @ 08:35)  POCT Blood Glucose.: 111 mg/dL (06-08-18 @ 01:50)  POCT Blood Glucose.: 140 mg/dL (06-07-18 @ 22:04)  POCT Blood Glucose.: 153 mg/dL (06-07-18 @ 18:45)  POCT Blood Glucose.: 199 mg/dL (06-07-18 @ 17:41)  POCT Blood Glucose.: 66 mg/dL (06-07-18 @ 17:17)  POCT Blood Glucose.: 160 mg/dL (06-07-18 @ 15:00) L 10  POCT Blood Glucose.: 190 mg/dL (06-07-18 @ 14:04)  POCT Blood Glucose.: 144 mg/dL (06-07-18 @ 12:39)  POCT Blood Glucose.: 156 mg/dL (06-07-18 @ 11:07)  POCT Blood Glucose.: 168 mg/dL (06-07-18 @ 10:02)  POCT Blood Glucose.: 156 mg/dL (06-07-18 @ 09:15)  POCT Blood Glucose.: 152 mg/dL (06-07-18 @ 08:21)  POCT Blood Glucose.: 159 mg/dL (06-07-18 @ 06:57)  POCT Blood Glucose.: 185 mg/dL (06-07-18 @ 05:54)  POCT Blood Glucose.: 210 mg/dL (06-07-18 @ 05:04)  POCT Blood Glucose.: 260 mg/dL (06-07-18 @ 04:06)  POCT Blood Glucose.: 335 mg/dL (06-07-18 @ 03:13)  POCT Blood Glucose.: 346 mg/dL (06-07-18 @ 02:01)  POCT Blood Glucose.: 359 mg/dL (06-07-18 @ 01:28)  POCT Blood Glucose.: 189 mg/dL (06-07-18 @ 01:08)  POCT Blood Glucose.: 174 mg/dL (06-07-18 @ 00:14)  POCT Blood Glucose.: 147 mg/dL (06-06-18 @ 23:09)  POCT Blood Glucose.: 105 mg/dL (06-06-18 @ 18:47)  POCT Blood Glucose.: 297 mg/dL (06-06-18 @ 15:36)  POCT Blood Glucose.: 187 mg/dL (06-06-18 @ 12:44)                          10.0   9.4   )-----------( 278      ( 08 Jun 2018 02:02 )             30.5       06-08    135  |  101  |  6<L>  ----------------------------<  113<H>  3.4<L>   |  23  |  0.39<L>    EGFR if : 145  EGFR if non : 125    Ca    8.7      06-08  Mg     1.7     06-08  Phos  2.5     06-08    TPro  7.8  /  Alb  2.7<L>  /  TBili  0.7  /  DBili  x   /  AST  94<H>  /  ALT  24  /  AlkPhos  679<H>  06-06      Hemoglobin A1C, Whole Blood: 8.2 % <H> [4.0 - 5.6] (06-07-18 @ 03:08) HPI:  67 y/o M PMH HTN, uncontrolled DM2 with neuropathy,  HLD, and stage 4 neuroendocrine CA (not on current chemo, finished radiation 2 weeks prior, currently awaiting trial therapy) weakness, found to have DKA on admission and admitted to the MICU for management of DKA. He was on an insulin gtt and transitioned to Lantus.    Endocrine History:  Patient states that he was diagnosed with DM2 years ago. Takes Lantus 15 units at bedtime, Invokana, and Metformin. Reports taking his medications. Does not check FS at home. Has not seen optho in years and does not know if he has retinopathy. Has neuropathy in the feet. No history of nephropathy. Drinks juice, no soda. Has blurry vision, polyuria, polydipsia. Not taking any po at this time. Reports some nausea, but does not have abdominal pain, vomiting. HbA1c here is 8.2. Reports recent weight loss. States he missed a dose of his Lantus prior to coming to the hospital.     Of note, patient has metastatic high grade neuroendocrine tumor of unknown origin, suspected to be upper GI in origin, with bone and liver involvement. Recently underwent chemo and has undergone RT to the spine. May attempt treatment with immunotherapy. Follows at [facility].     PAST MEDICAL & SURGICAL HISTORY:  Hyperlipidemia  Diabetes  HTN (hypertension)  No significant past surgical history      FAMILY HISTORY:  DM2 in both parents      Social History:  No cigarette or alcohol use  Lives with wife and children    Outpatient Medications:  · 	Invokamet:  orally   · 	morphine 15 mg oral tablet: 1 tab(s) orally every 4 hours, As Needed  · 	Zofran 8 mg oral tablet: 1 tab(s) orally 3 times a day, As Needed  · 	Reglan 10 mg oral tablet: 1 tab(s) orally 4 times a day (before meals and at bedtime), As Needed  · 	Lantus: 10 unit(s) subcutaneous once a day (at bedtime)  · 	ALPRAZolam 0.5 mg oral tablet: orally 4 times a day  · 	MiraLax oral powder for reconstitution: orally once a day, As Needed - for constipation  · 	docusate sodium 100 mg oral capsule: 1 cap(s) orally 2 times a day    MEDICATIONS  (STANDING):  cefTRIAXone   IVPB 1 Gram(s) IV Intermittent every 24 hours  dextrose 5%. 1000 milliLiter(s) (50 mL/Hr) IV Continuous <Continuous>  heparin  Injectable 5000 Unit(s) SubCutaneous every 8 hours  insulin glargine Injectable (LANTUS) 10 Unit(s) SubCutaneous <User Schedule>  insulin glargine Injectable (LANTUS) 7 Unit(s) SubCutaneous once  insulin lispro (HumaLOG) corrective regimen sliding scale   SubCutaneous three times a day before meals  insulin lispro (HumaLOG) corrective regimen sliding scale   SubCutaneous at bedtime    MEDICATIONS  (PRN):  ALPRAZolam 0.5 milliGRAM(s) Oral daily PRN anxiety  morphine  IR 15 milliGRAM(s) Oral every 4 hours PRN Severe Pain (7 - 10)      Allergies  No Known Allergies      Review of Systems:  Constitutional: No fever  Eyes: + blurry vision  Neuro: No tremors  HEENT: No pain  Cardiovascular: No chest pain, palpitations  Respiratory: No SOB, no cough  GI: + nausea; vomiting, abdominal pain  : No dysuria  Skin: no rash  Endocrine: + polyuria, polydipsia    ALL OTHER SYSTEMS REVIEWED AND NEGATIVE      PHYSICAL EXAM:  VITALS: T(C): 36.8 (06-08-18 @ 14:36)  T(F): 98.3 (06-08-18 @ 14:36), Max: 98.9 (06-07-18 @ 20:00)  HR: 101 (06-08-18 @ 14:36) (101 - 122)  BP: 120/68 (06-08-18 @ 14:36) (114/66 - 145/81)  RR:  (15 - 29)  SpO2:  (96% - 100%)  Wt(kg): --  GENERAL: NAD, cachetic   EYES: No proptosis, anicteric  HEENT:  Atraumatic, Normocephalic, dry mucous membranes  RESPIRATORY: Clear to auscultation bilaterally; No rales, rhonchi, wheezing  CARDIOVASCULAR: Regular rate and rhythm; No murmurs; no peripheral edema  GI: Soft, nontender, non distended, normal bowel sounds  SKIN: Dry, intact, No ulcers or wounds on feet  PSYCH: Alert and oriented x 3, reactive affect      POCT Blood Glucose.: 155 mg/dL (06-08-18 @ 15:16)  POCT Blood Glucose.: 72 mg/dL (06-08-18 @ 12:10)  POCT Blood Glucose.: 88 mg/dL (06-08-18 @ 08:35)  POCT Blood Glucose.: 111 mg/dL (06-08-18 @ 01:50)  POCT Blood Glucose.: 140 mg/dL (06-07-18 @ 22:04)  POCT Blood Glucose.: 153 mg/dL (06-07-18 @ 18:45)  POCT Blood Glucose.: 199 mg/dL (06-07-18 @ 17:41)  POCT Blood Glucose.: 66 mg/dL (06-07-18 @ 17:17)  POCT Blood Glucose.: 160 mg/dL (06-07-18 @ 15:00) L 10  POCT Blood Glucose.: 190 mg/dL (06-07-18 @ 14:04)  POCT Blood Glucose.: 144 mg/dL (06-07-18 @ 12:39)  POCT Blood Glucose.: 156 mg/dL (06-07-18 @ 11:07)  POCT Blood Glucose.: 168 mg/dL (06-07-18 @ 10:02)  POCT Blood Glucose.: 156 mg/dL (06-07-18 @ 09:15)  POCT Blood Glucose.: 152 mg/dL (06-07-18 @ 08:21)  POCT Blood Glucose.: 159 mg/dL (06-07-18 @ 06:57)  POCT Blood Glucose.: 185 mg/dL (06-07-18 @ 05:54)  POCT Blood Glucose.: 210 mg/dL (06-07-18 @ 05:04)  POCT Blood Glucose.: 260 mg/dL (06-07-18 @ 04:06)  POCT Blood Glucose.: 335 mg/dL (06-07-18 @ 03:13)  POCT Blood Glucose.: 346 mg/dL (06-07-18 @ 02:01)  POCT Blood Glucose.: 359 mg/dL (06-07-18 @ 01:28)  POCT Blood Glucose.: 189 mg/dL (06-07-18 @ 01:08)  POCT Blood Glucose.: 174 mg/dL (06-07-18 @ 00:14)  POCT Blood Glucose.: 147 mg/dL (06-06-18 @ 23:09)  POCT Blood Glucose.: 105 mg/dL (06-06-18 @ 18:47)  POCT Blood Glucose.: 297 mg/dL (06-06-18 @ 15:36)  POCT Blood Glucose.: 187 mg/dL (06-06-18 @ 12:44)                          10.0   9.4   )-----------( 278      ( 08 Jun 2018 02:02 )             30.5       06-08    135  |  101  |  6<L>  ----------------------------<  113<H>  3.4<L>   |  23  |  0.39<L>    EGFR if : 145  EGFR if non : 125    Ca    8.7      06-08  Mg     1.7     06-08  Phos  2.5     06-08    TPro  7.8  /  Alb  2.7<L>  /  TBili  0.7  /  DBili  x   /  AST  94<H>  /  ALT  24  /  AlkPhos  679<H>  06-06      Hemoglobin A1C, Whole Blood: 8.2 % <H> [4.0 - 5.6] (06-07-18 @ 03:08) HPI:  69 y/o M PMH HTN, uncontrolled DM2 with neuropathy,  HLD, and stage 4 neuroendocrine CA (not on current chemo, finished radiation 2 weeks prior, currently awaiting trial therapy) weakness, found to have DKA on admission and admitted to the MICU for management of DKA. He was noted to have a serum glucose of 198, anion gap of 31 with bicarb of 14, BHB 6.6. He was on an insulin gtt and transitioned to Lantus.    Endocrine History:  Patient states that he was diagnosed with DM2 years ago. Takes Lantus 15 units at bedtime, Invokana, and Metformin. Reports taking his medications. Does not check FS at home. Has not seen optho in years and does not know if he has retinopathy. Has neuropathy in the feet. No history of nephropathy. Drinks juice, no soda. Has blurry vision, polyuria, polydipsia. Not taking any po at this time. Reports some nausea, but does not have abdominal pain, vomiting. HbA1c here is 8.2. Reports recent weight loss. States he missed a dose of his Lantus prior to coming to the hospital. Does not have an endocrinologist, only a PMD.    Of note, patient has metastatic high grade neuroendocrine tumor of unknown origin, suspected to be upper GI in origin, with bone and liver involvement. Recently underwent chemo and has undergone RT to the spine. May attempt treatment with immunotherapy. Follows at [facility].     PAST MEDICAL & SURGICAL HISTORY:  Hyperlipidemia  Diabetes  HTN (hypertension)  No significant past surgical history      FAMILY HISTORY:  DM2 in both parents      Social History:  No cigarette or alcohol use  Lives with wife and children    Outpatient Medications:  · 	Invokamet:  orally   · 	morphine 15 mg oral tablet: 1 tab(s) orally every 4 hours, As Needed  · 	Zofran 8 mg oral tablet: 1 tab(s) orally 3 times a day, As Needed  · 	Reglan 10 mg oral tablet: 1 tab(s) orally 4 times a day (before meals and at bedtime), As Needed  · 	Lantus: 10 unit(s) subcutaneous once a day (at bedtime)  · 	ALPRAZolam 0.5 mg oral tablet: orally 4 times a day  · 	MiraLax oral powder for reconstitution: orally once a day, As Needed - for constipation  · 	docusate sodium 100 mg oral capsule: 1 cap(s) orally 2 times a day    MEDICATIONS  (STANDING):  cefTRIAXone   IVPB 1 Gram(s) IV Intermittent every 24 hours  dextrose 5%. 1000 milliLiter(s) (50 mL/Hr) IV Continuous <Continuous>  heparin  Injectable 5000 Unit(s) SubCutaneous every 8 hours  insulin glargine Injectable (LANTUS) 10 Unit(s) SubCutaneous <User Schedule>  insulin glargine Injectable (LANTUS) 7 Unit(s) SubCutaneous once  insulin lispro (HumaLOG) corrective regimen sliding scale   SubCutaneous three times a day before meals  insulin lispro (HumaLOG) corrective regimen sliding scale   SubCutaneous at bedtime    MEDICATIONS  (PRN):  ALPRAZolam 0.5 milliGRAM(s) Oral daily PRN anxiety  morphine  IR 15 milliGRAM(s) Oral every 4 hours PRN Severe Pain (7 - 10)      Allergies  No Known Allergies      Review of Systems:  Constitutional: No fever  Eyes: + blurry vision  Neuro: No tremors  HEENT: No pain  Cardiovascular: No chest pain, palpitations  Respiratory: No SOB, no cough  GI: + nausea; vomiting, abdominal pain  : No dysuria  Skin: no rash  Endocrine: + polyuria, polydipsia    ALL OTHER SYSTEMS REVIEWED AND NEGATIVE      PHYSICAL EXAM:  VITALS: T(C): 36.8 (06-08-18 @ 14:36)  T(F): 98.3 (06-08-18 @ 14:36), Max: 98.9 (06-07-18 @ 20:00)  HR: 101 (06-08-18 @ 14:36) (101 - 122)  BP: 120/68 (06-08-18 @ 14:36) (114/66 - 145/81)  RR:  (15 - 29)  SpO2:  (96% - 100%)  Wt(kg): --  GENERAL: NAD, cachetic   EYES: No proptosis, anicteric  HEENT:  Atraumatic, Normocephalic, dry mucous membranes  RESPIRATORY: Clear to auscultation bilaterally; No rales, rhonchi, wheezing  CARDIOVASCULAR: Regular rate and rhythm; No murmurs; no peripheral edema  GI: Soft, nontender, non distended, normal bowel sounds  SKIN: Dry, intact, No ulcers or wounds on feet  PSYCH: Alert and oriented x 3, reactive affect      POCT Blood Glucose.: 155 mg/dL (06-08-18 @ 15:16)  POCT Blood Glucose.: 72 mg/dL (06-08-18 @ 12:10)  POCT Blood Glucose.: 88 mg/dL (06-08-18 @ 08:35)  POCT Blood Glucose.: 111 mg/dL (06-08-18 @ 01:50)  POCT Blood Glucose.: 140 mg/dL (06-07-18 @ 22:04)  POCT Blood Glucose.: 153 mg/dL (06-07-18 @ 18:45)  POCT Blood Glucose.: 199 mg/dL (06-07-18 @ 17:41)  POCT Blood Glucose.: 66 mg/dL (06-07-18 @ 17:17)  POCT Blood Glucose.: 160 mg/dL (06-07-18 @ 15:00) L 10  POCT Blood Glucose.: 190 mg/dL (06-07-18 @ 14:04)  POCT Blood Glucose.: 144 mg/dL (06-07-18 @ 12:39)  POCT Blood Glucose.: 156 mg/dL (06-07-18 @ 11:07)  POCT Blood Glucose.: 168 mg/dL (06-07-18 @ 10:02)  POCT Blood Glucose.: 156 mg/dL (06-07-18 @ 09:15)  POCT Blood Glucose.: 152 mg/dL (06-07-18 @ 08:21)  POCT Blood Glucose.: 159 mg/dL (06-07-18 @ 06:57)  POCT Blood Glucose.: 185 mg/dL (06-07-18 @ 05:54)  POCT Blood Glucose.: 210 mg/dL (06-07-18 @ 05:04)  POCT Blood Glucose.: 260 mg/dL (06-07-18 @ 04:06)  POCT Blood Glucose.: 335 mg/dL (06-07-18 @ 03:13)  POCT Blood Glucose.: 346 mg/dL (06-07-18 @ 02:01)  POCT Blood Glucose.: 359 mg/dL (06-07-18 @ 01:28)  POCT Blood Glucose.: 189 mg/dL (06-07-18 @ 01:08)  POCT Blood Glucose.: 174 mg/dL (06-07-18 @ 00:14)  POCT Blood Glucose.: 147 mg/dL (06-06-18 @ 23:09)  POCT Blood Glucose.: 105 mg/dL (06-06-18 @ 18:47)  POCT Blood Glucose.: 297 mg/dL (06-06-18 @ 15:36)  POCT Blood Glucose.: 187 mg/dL (06-06-18 @ 12:44)                          10.0   9.4   )-----------( 278      ( 08 Jun 2018 02:02 )             30.5       06-08    135  |  101  |  6<L>  ----------------------------<  113<H>  3.4<L>   |  23  |  0.39<L>    EGFR if : 145  EGFR if non : 125    Ca    8.7      06-08  Mg     1.7     06-08  Phos  2.5     06-08    TPro  7.8  /  Alb  2.7<L>  /  TBili  0.7  /  DBili  x   /  AST  94<H>  /  ALT  24  /  AlkPhos  679<H>  06-06      Hemoglobin A1C, Whole Blood: 8.2 % <H> [4.0 - 5.6] (06-07-18 @ 03:08)

## 2018-06-08 NOTE — CHART NOTE - NSCHARTNOTEFT_GEN_A_CORE
68M w/ h/o HTN, DMII (on Lantus, Invokana), HLD, stage 4 endocrine CA (no on chemo, s/p radiation 2 wks prior), currently awaiting trial therapy initially presented on 6/6/18 with weakness along with post-prandial pre-syncopal episode and dizziness in the setting of several days of insulin non-adherance. Of note patient had CT scan on 5/30 showing metastasis to liver and new nodule in lung suggestive of metastasis. Family notified pt's oncologist (Dr. Dick) @ [facility] of current symptoms and pt was advised to come to ER for further evaluation. In the ED, pt noted to be in DKA with an AG of 31, decreased bicarb, mild acidosis with BHB of 6.6. In the ER, recevied Ceftriaxone, Humalog 6U, Ativan, 2L fluids. Pt was subsequently admitted to the MICU where he was started on D10 and insulin gtt for DKA with FS q1h and BMP q4h. Patient was noted to have positive UA and was treated empirically with ceftriaxone for UTI. Patient's electrolytes was repleted. Upon closure of his AG , p restarted on Lantus 10U w/ ISS, insulin gtt was d/c'ed and pt was started on diet. On 6/8, pt noted to have AG of 11, BHB of 0.4. On 6/8/18 pt was deemed medically optimized for step down to medicine floor for continued care.     Follow-up:  [ ] Monitor for insulin requirement and titrate regimen accordingly  [ ] Diabetes education  [ ] c/w xanax PRN for anxiety   [ ] Continue with ceftriaxone (3 day course) for UTI (Day 3/3)   [ ] C/w pain medication for metastatic neuroendocrine tumor  [ ] f/up any additional oncology recs prior to d/c    Yen Alexander, PGY3   MAR 8AM - 8PM   [phone] / [id number]

## 2018-06-08 NOTE — PROGRESS NOTE ADULT - ATTENDING COMMENTS
Pt seen and examined. 68 M with stage IV neuroendocrine tumor, DM type 2 on Invokana, HTN, HLD, now admitted with DKA and a UTI. Cont insulin gtt until AG closed. Increase D10 gtt to 100 cc/hr as FS are running below 150. Cont to follow FS Q1H. IV hydration with NS @ 75 cc/hr with 20 meq of K+. Follow BMP Q4H. Bridge to Lantus once AG closed and serum bicarb improved/ beta hydroxy butyrate cleared. Cont Ceftriaxone IV for UTI. FUP urine Cx. Onc follow up for neuroendocrine tumor.   - Critical Care Time Spent: 35 mins
Pt seen and examined. 68 M with stage IV neuroendocrine tumor, DM type 2 on Invokana, HTN, HLD, now admitted with DKA and a UTI. DKA resolved, FS stable on Lantus and insulin S/S. Cont Ceftriaxone for UTI, FUP Cx. Stable for transfer to floor.   - Critical Care Time Spent: 35 mins
Gina Tariq  Hospitalist

## 2018-06-08 NOTE — DIETITIAN INITIAL EVALUATION ADULT. - PT NOT SOURCE
confused/no visitors at bedside, per RN family went home to return this evening confused/no visitors at bedside, per RN family went home,  to return this evening

## 2018-06-08 NOTE — DIETITIAN INITIAL EVALUATION ADULT. - OTHER INFO
Pt seen for: MICU Length Of Stay, BMI<19  Adm dx: DKA, UTI    GI issues: no N/V/D  Last BM: 6/7    Food allergies:  NKFA  Vit/supplement PTA: none noted Pt seen for: BMI<19  Adm dx: DKA, UTI    GI issues: no N/V/D  Last BM: 6/7    Food allergies:  NKFA  Vit/supplement PTA: none noted

## 2018-06-08 NOTE — CONSULT NOTE ADULT - PROBLEM SELECTOR PROBLEM 1
Ketoacidosis
Type 2 diabetes mellitus with ketoacidosis without coma, with long-term current use of insulin

## 2018-06-08 NOTE — CONSULT NOTE ADULT - ASSESSMENT
67 y/o M with uncontrolled DM2, HTN, HLD, presents with DKA in setting of Invokana use 69 y/o M with uncontrolled DM2, HTN, HLD, presents with euglycemic DKA in setting of Invokana use (high risk patient with high level decision-making).

## 2018-06-08 NOTE — PROGRESS NOTE ADULT - PROBLEM SELECTOR PLAN 7
Patient already has an apt with the Oncologist on 6/15/18 for initiation of immunotherapy   Labs will be ordered by the Oncologist before initiation of therapy.  Oncologist will schedule post hospital f/up once patient is d/c home Patient already has an apt with the Oncologist on 6/15/18 for initiation of immunotherapy as discussed with the Oncologist   Labs will be ordered by the Oncologist before initiation of therapy.  Oncologist will schedule post hospital f/up once patient is d/c home

## 2018-06-08 NOTE — PROGRESS NOTE ADULT - SUBJECTIVE AND OBJECTIVE BOX
Edwin Cespedes, PGY1  Pager [phone]    Summary:  67 yo male PMHX HTN, type 2 diabetes mellitus (on Lantus), HLD, and stage 4 neuroendocrine CA (not on current chemo, finished radiation 2 weeks prior, currently awaiting trial therapy) presents to ED BIBEMS c/o weakness found to have DKA, currently gap closed      INTERVAL HPI/OVERNIGHT EVENTS:      SUBJECTIVE: Patient seen and examined at bedside.     CONSTITUTIONAL: No weakness, fevers or chills  EYES/ENT: No visual changes;  No vertigo or throat pain   NECK: No pain or stiffness  RESPIRATORY: No cough, wheezing, hemoptysis; No shortness of breath  CARDIOVASCULAR: No chest pain or palpitations  GASTROINTESTINAL: No abdominal or epigastric pain. No nausea, vomiting, or hematemesis; No diarrhea or constipation. No melena or hematochezia.  GENITOURINARY: No dysuria, frequency or hematuria  NEUROLOGICAL: No numbness or weakness  SKIN: No itching, rashes    OBJECTIVE:    VITAL SIGNS:  ICU Vital Signs Last 24 Hrs  T(C): 37.1 (2018 04:00), Max: 37.7 (2018 12:00)  T(F): 98.7 (2018 04:00), Max: 99.8 (2018 12:00)  HR: 107 (2018 06:00) (107 - 122)  BP: 132/75 (2018 06:00) (120/66 - 145/81)  BP(mean): 96 (2018 06:00) (86 - 106)  ABP: --  ABP(mean): --  RR: 25 (2018 06:00) (18 - 31)  SpO2: 100% (2018 06:00) (96% - 100%)        07 @ 07:01  -  -08 @ 07:00  --------------------------------------------------------  IN: 2754 mL / OUT: 1575 mL / NET: 1179 mL      CAPILLARY BLOOD GLUCOSE      POCT Blood Glucose.: 111 mg/dL (2018 01:50)      PHYSICAL EXAM:    General: NAD  HEENT: NC/AT; PERRL, clear conjunctiva  Neck: supple  Respiratory: CTA b/l  Cardiovascular: +S1/S2; RRR  Abdomen: soft, NT/ND; +BS x4  Extremities: WWP, 2+ peripheral pulses b/l; no LE edema  Skin: normal color and turgor; no rash  Neurological:    MEDICATIONS:  MEDICATIONS  (STANDING):  cefTRIAXone   IVPB 1 Gram(s) IV Intermittent every 24 hours  dextrose 5%. 1000 milliLiter(s) (50 mL/Hr) IV Continuous <Continuous>  heparin  Injectable 5000 Unit(s) SubCutaneous every 8 hours  insulin glargine Injectable (LANTUS) 10 Unit(s) SubCutaneous <User Schedule>  insulin lispro (HumaLOG) corrective regimen sliding scale   SubCutaneous three times a day before meals  insulin lispro (HumaLOG) corrective regimen sliding scale   SubCutaneous at bedtime    MEDICATIONS  (PRN):  ALPRAZolam 0.5 milliGRAM(s) Oral daily PRN anxiety  morphine  IR 15 milliGRAM(s) Oral every 4 hours PRN Severe Pain (7 - 10)      ALLERGIES:  Allergies    No Known Allergies    Intolerances        LABS:                        10.0   9.4   )-----------( 278      ( 2018 02:02 )             30.5     06-08    135  |  101  |  6<L>  ----------------------------<  113<H>  3.4<L>   |  23  |  0.39<L>    Ca    8.7      2018 02:02  Phos  2.5     06-08  Mg     1.7     06-08    TPro  7.8  /  Alb  2.7<L>  /  TBili  0.7  /  DBili  x   /  AST  94<H>  /  ALT  24  /  AlkPhos  679<H>  -    PT/INR - ( 2018 13:19 )   PT: 15.9 sec;   INR: 1.45 ratio         PTT - ( 2018 13:19 )  PTT:31.3 sec  Urinalysis Basic - ( 2018 15:19 )    Color: Yellow / Appearance: Clear / S.024 / pH: x  Gluc: x / Ketone: Large  / Bili: Negative / Urobili: Negative   Blood: x / Protein: Trace / Nitrite: Negative   Leuk Esterase: Large / RBC: 0-2 /HPF / WBC 3-5 /HPF   Sq Epi: x / Non Sq Epi: OCC /HPF / Bacteria: Few /HPF        RADIOLOGY & ADDITIONAL TESTS: Reviewed. Edwin Cespedes, PGY1  Pager [phone]    Summary:  69 yo male PMHX HTN, type 2 diabetes mellitus (on Lantus), HLD, and stage 4 neuroendocrine CA (not on current chemo, finished radiation 2 weeks prior, currently awaiting trial therapy) presents to ED BIBEMS c/o weakness found to have DKA, currently gap closed      INTERVAL HPI/OVERNIGHT EVENTS:  No acute event overnight. Patient without complaint at this time      CONSTITUTIONAL: No fevers or chills  EYES/ENT: No visual changes;  No vertigo or throat pain   RESPIRATORY: No cough, No shortness of breath  CARDIOVASCULAR: No chest pain or palpitations  GASTROINTESTINAL: No abdominal or epigastric pain. No nausea, vomiting    OBJECTIVE:    VITAL SIGNS:  ICU Vital Signs Last 24 Hrs  T(C): 37.1 (2018 04:00), Max: 37.7 (2018 12:00)  T(F): 98.7 (2018 04:00), Max: 99.8 (2018 12:00)  HR: 107 (2018 06:00) (107 - 122)  BP: 132/75 (2018 06:00) (120/66 - 145/81)  BP(mean): 96 (2018 06:00) (86 - 106)  ABP: --  ABP(mean): --  RR: 25 (2018 06:00) (18 - 31)  SpO2: 100% (2018 06:00) (96% - 100%)        06-07 @ 07:01  -  06-08 @ 07:00  --------------------------------------------------------  IN: 2754 mL / OUT: 1575 mL / NET: 1179 mL      CAPILLARY BLOOD GLUCOSE      POCT Blood Glucose.: 111 mg/dL (2018 01:50)    PE:  General: NAD, resting comfortably in bed  HEENT: NC/AT; normal conjunctiva  Respiratory: CTA b/l, no wheezing  Cardiovascular: +S1/S2; RRR  Abdomen: soft, NT/ND; +BS x4  Extremities: WWP, 2+ peripheral pulses b/l; no LE edema  Skin: normal color and turgor; no rash  Neurological: no focal deficit.    MEDICATIONS:  MEDICATIONS  (STANDING):  cefTRIAXone   IVPB 1 Gram(s) IV Intermittent every 24 hours  dextrose 5%. 1000 milliLiter(s) (50 mL/Hr) IV Continuous <Continuous>  heparin  Injectable 5000 Unit(s) SubCutaneous every 8 hours  insulin glargine Injectable (LANTUS) 10 Unit(s) SubCutaneous <User Schedule>  insulin lispro (HumaLOG) corrective regimen sliding scale   SubCutaneous three times a day before meals  insulin lispro (HumaLOG) corrective regimen sliding scale   SubCutaneous at bedtime    MEDICATIONS  (PRN):  ALPRAZolam 0.5 milliGRAM(s) Oral daily PRN anxiety  morphine  IR 15 milliGRAM(s) Oral every 4 hours PRN Severe Pain (7 - 10)      ALLERGIES:  Allergies    No Known Allergies    Intolerances        LABS:                        10.0   9.4   )-----------( 278      ( 2018 02:02 )             30.5     06-08    135  |  101  |  6<L>  ----------------------------<  113<H>  3.4<L>   |  23  |  0.39<L>    Ca    8.7      2018 02:02  Phos  2.5     06-08  Mg     1.7     -08    TPro  7.8  /  Alb  2.7<L>  /  TBili  0.7  /  DBili  x   /  AST  94<H>  /  ALT  24  /  AlkPhos  679<H>  06-06    PT/INR - ( 2018 13:19 )   PT: 15.9 sec;   INR: 1.45 ratio         PTT - ( 2018 13:19 )  PTT:31.3 sec  Urinalysis Basic - ( 2018 15:19 )    Color: Yellow / Appearance: Clear / S.024 / pH: x  Gluc: x / Ketone: Large  / Bili: Negative / Urobili: Negative   Blood: x / Protein: Trace / Nitrite: Negative   Leuk Esterase: Large / RBC: 0-2 /HPF / WBC 3-5 /HPF   Sq Epi: x / Non Sq Epi: OCC /HPF / Bacteria: Few /HPF        RADIOLOGY & ADDITIONAL TESTS: Reviewed.

## 2018-06-08 NOTE — PROGRESS NOTE ADULT - ASSESSMENT
67 yo male PMHX HTN, type 2 diabetes mellitus (on Lantus), HLD, and stage 4 neuroendocrine CA (not on current chemo, finished radiation 2 weeks prior, currently awaiting trial therapy) presents to ED BIBEMS c/o weakness found to have elevated anion gap to 31, decreased bicarb, mild acidosis with BHB positive of 6.6 likely consistent with mild DKA.     Plan:    Neuro:   - Baseline dementia, appear mild confused likely metabolic encephalopathy 2/2 DKA and UTI.   - Currently AO x2    Cardiovascular:  - No acute issues.  - No pressor requirements.  - Hemodynamically stable.    Respiratory:  - No acute issues, saturating well on room air.     GI:  - Keep NPO except meds until anion gap closes.     Renal:  - No acute issues. Monitor i's and O's.   - Monitor BMP q4h.    - Add NS with 20meq KCl to replete electrolyte.    Endocrine:  - Mild DKA, likely 2/2 SGLT-1 Inhibitor use at home, with BHB elevated to 6.6 with gap in the 20's  - C/w D10 at rate of 100cc/ hour  - C/w insulin gtt, until gap close and start subq insulin   - Check FS q1H.   - Check BMP q4H  - Check HBA1C    Heme:  - Metastatic neuroendocrine tumor not on active chemo  - Heme c/s in AM   - continue outpatient management  - C/w pain control      ID:  - Positive UA with large LE and few bacteria  - Will continue treat with ceftriaxone 1g qd (3-5 days)        DVT PPX:  HSQ    He Rubina - PGY 1  Internal Medicine - MICU -  67 yo male PMHX HTN, type 2 diabetes mellitus (on Lantus), HLD, and stage 4 neuroendocrine CA (not on current chemo, finished radiation 2 weeks prior, currently awaiting trial therapy) presents to ED BIBEMS c/o weakness found to have elevated anion gap to 31, decreased bicarb, mild acidosis with BHB positive of 6.6 likely consistent with mild DKA.     Plan:    Neuro:   - Baseline dementia  - Currently AO x2    Cardiovascular:  - No acute issues.  - No pressor requirements.  - Hemodynamically stable.    Respiratory:  - No acute issues, saturating well on room air.     GI:  - C/w CC diet.     Renal:  - No acute issues.    Endocrine:  - Mild DKA, likely 2/2 SGLT-1 Inhibitor use VS UTI, with BHB elevated to 6.6 with gap in the 20's  - Currently gap closed  - C/w lantus 10U qhs  - Titrate insulin regimen according to requirement  - patient need diabetic education.   - Check HBA1C    Heme:  - Metastatic neuroendocrine tumor not on active chemo  - Heme c/s  - continue outpatient management  - C/w pain control      ID:  - Positive UA with large LE and few bacteria  - urine cx negative (however was marco after abx)  - Will continue treat with ceftriaxone 1g qd (3-5 days)        DVT PPX:  HSQ    He Rubina - PGY 1  Internal Medicine - MICU -

## 2018-06-08 NOTE — DIETITIAN INITIAL EVALUATION ADULT. - PHYSICAL APPEARANCE
NFPE performed, pt unable to provide consent. Noted moderate muscle loss in temporal, clavicle and patella areas, moderate fat loss in tricep, and orbital regions/underweight underweight/NFPE performed, pt unable to provide consent. Noted severe muscle loss in temporal, clavicle and patella areas, moderate fat loss in tricep, and orbital regions

## 2018-06-08 NOTE — PROGRESS NOTE ADULT - ASSESSMENT
67 yo Irish speaking male PMHX HTN, type 2 diabetes mellitus (on Lantus), HLD, Anxiety on PRN Alprazolam,  and stage 4 neuroendocrine CA (not on current chemo, finished radiation 2 weeks prior, currently awaiting trial therapy) was admitted to the hospital on 6/6/18 due to c/o weakness, presyncope, dizziness was found to have elevated anion gap to 31, decreased bicarb, glucosuria, mild acidosis with increased BHB of 6.6 likely consistent with mild DKA which has since resolved. 69 yo Czech speaking male PMHX HTN, type 2 diabetes mellitus (on Lantus), HLD, Anxiety on PRN Alprazolam,  and stage 4 Neuroendocrine CA (not on current chemo, finished radiation 2 weeks prior, currently awaiting trial therapy) was admitted to the hospital on 6/6/18 due to c/o weakness, presyncope, dizziness was found to have elevated anion gap to 31, decreased bicarb, glucosuria, mild acidosis with increased BHB of 6.6 likely consistent with mild DKA which has since resolved.

## 2018-06-08 NOTE — CONSULT NOTE ADULT - PROBLEM SELECTOR PROBLEM 2
Urinary tract infection without hematuria, site unspecified
Uncontrolled type 2 diabetes mellitus with diabetic polyneuropathy, with long-term current use of insulin

## 2018-06-08 NOTE — DIETITIAN INITIAL EVALUATION ADULT. - ENERGY NEEDS
Ht: 67"  Wt: 112  BMI: 17.6 kg/m2   IBW: 148 (+/-10%)     76% IBW  Edema: none   Skin: no pressure injuries

## 2018-06-08 NOTE — PROGRESS NOTE ADULT - PROBLEM SELECTOR PLAN 3
Patient will complete 3 days of Ceftriaxone IV and will be d/c Patient will complete 3 days of Ceftriaxone IV and will be d/c after the last dose today

## 2018-06-08 NOTE — PROGRESS NOTE ADULT - PROBLEM SELECTOR PLAN 1
Now resolved with AG of 11, BHB of 0.4, serum Bicarb of 23, serum Na = 135   Patient is currently on  Lantus 10U w/ ISS, Patient was placed on CHO consistent diet on 6/7 and will continue to monitor blood glucose   Patient has seen the Dietitian   Patient might need outpatient PCP close monitoring and might need assistance at home with his medication administration Now resolved with AG of 11, BHB of 0.4, serum Bicarb of 23, serum Na = 135   Patient is currently on  Lantus 10U w/ ISS, Patient was placed on CHO consistent diet on 6/7 and will continue to monitor blood glucose   Patient has seen the Dietitian   Endocrine consult was called   Patient might need outpatient close monitoring and I spoke with Patient's daughter who states that she helps with administration with his medications. Now resolved with AG of 11, BHB of 0.4, serum Bicarb of 23, serum Na = 135   Patient is currently on  Lantus 10U w/ ISS, Patient was placed on CHO consistent diet on 6/7 and will continue to monitor blood glucose / repeat blood glucose before administering Lantus/ f/up glucose after patient receives meal.    Patient has seen the Dietitian   Endocrine consult was called   Patient has received oral potassium supplement / f/up repeat serum potassium in AM  Patient might need outpatient close monitoring and I spoke with Patient's daughter who states that she helps with administration with his medications.

## 2018-06-08 NOTE — PROGRESS NOTE ADULT - PROBLEM SELECTOR PLAN 6
currently stable   cont to monitor   Anemia profile currently stable   cont to monitor   Anemia profile in Am

## 2018-06-08 NOTE — PROGRESS NOTE ADULT - SUBJECTIVE AND OBJECTIVE BOX
INTERNAL MEDICINE ACCEPTANCE NOTE  :     Patient is a 68y old  Male who presents with a chief complaint of DKA (2018 22:17)      INTERVAL HPI/OVERNIGHT EVENTS:    68M with h/o HTN, DMII (on Lantus, Invokana), HLD, Anxiety on PRN Alprazolam, stage 4 Endocrine CA (no on chemo, s/p radiation 2 wks prior), currently awaiting trial therapy initially presented to the hospital  on 18 with c/o general weakness and post-prandial pre-syncopal episode with no LOC, and dizziness in the setting of several days of reported insulin non-adherance.  Patient was also reported to have poor appetite, increased urination with no dysuria or fever.   Of note patient had CT scan on  showing metastasis to liver and new nodule in lung suggestive of metastasis. Family notified pt's oncologist (Dr. Dick) @ [facility] of current symptoms and pt was advised to report to ER for further evaluation. In the ED, pt was noted to be in DKA with an AG of 31 with blood glucose of 198, decreased bicarb, mild acidosis with BHB of 6.6. In the ER, recevied Ceftriaxone, Humalog 6U, Ativan, 2L fluids. Pt was subsequently admitted to the MICU where he was started on D10 and insulin gtt for DKA with FS q1h and BMP q4h.  Patient's electrolytes were  repleted. Upon closure of his AG , pt was   restarted on Lantus 10U w/ ISS, insulin gtt was d/c'ed and pt was started on oral diet. On , pt noted to have AG of 11, BHB of 0.4. On 18 pt was deemed medically optimized for step down to medicine floor for continued care.   Patient was noted to have positive UA and was treated empirically with ceftriaxone for UTI.      Medications:MEDICATIONS  (STANDING):  cefTRIAXone   IVPB 1 Gram(s) IV Intermittent every 24 hours  dextrose 5%. 1000 milliLiter(s) (50 mL/Hr) IV Continuous <Continuous>  heparin  Injectable 5000 Unit(s) SubCutaneous every 8 hours  insulin glargine Injectable (LANTUS) 10 Unit(s) SubCutaneous <User Schedule>  insulin lispro (HumaLOG) corrective regimen sliding scale   SubCutaneous three times a day before meals  insulin lispro (HumaLOG) corrective regimen sliding scale   SubCutaneous at bedtime    MEDICATIONS  (PRN):  ALPRAZolam 0.5 milliGRAM(s) Oral daily PRN anxiety  morphine  IR 15 milliGRAM(s) Oral every 4 hours PRN Severe Pain (7 - 10)      Allergies: Allergies    No Known Allergies    Intolerances        REVIEW OF SYSTEMS:  CONSTITUTIONAL: No fever  EYES: No visual disturbances  ENMT:  No throat pain  NECK: No pain or stiffness  RESPIRATORY: No cough, wheezing, chills or hemoptysis; No shortness of breath  CARDIOVASCULAR: No chest pain, palpitations, dizziness, or leg swelling  GASTROINTESTINAL: No abdominal or epigastric pain. No nausea, vomiting, or hematemesis; No diarrhea or constipation. No melena or hematochezia.  NEUROLOGICAL: No headaches, memory loss, loss of strength, numbness, or tremors  SKIN: No itching, burning, rashes, or lesions   MUSCULOSKELETAL: No joint pain or swelling; No muscle, back, or extremity pain  PSYCHIATRIC: No depression, anxiety, mood swings, or difficulty sleeping  HEME/LYMPH: No easy bruising, or bleeding gums  ALLERY AND IMMUNOLOGIC: No hives or eczema    T(C): 36.4 (18 @ 11:17), Max: 37.2 (18 @ 20:00)  HR: 106 (18 @ 11:17) (104 - 122)  BP: 119/66 (18 @ 11:17) (114/66 - 145/81)  RR: 18 (18 @ 11:17) (15 - 29)  SpO2: 97% (18 @ 11:17) (96% - 100%)  Wt(kg): --Vital Signs Last 24 Hrs  T(C): 36.4 (2018 11:17), Max: 37.2 (2018 20:00)  T(F): 97.6 (2018 11:17), Max: 98.9 (2018 20:00)  HR: 106 (2018 11:17) (104 - 122)  BP: 119/66 (2018 11:17) (114/66 - 145/81)  BP(mean): 84 (2018 10:00) (84 - 106)  RR: 18 (2018 11:17) (15 - 29)  SpO2: 97% (2018 11:17) (96% - 100%)  I&O's Summary    2018 07:01  -  2018 07:00  --------------------------------------------------------  IN: 2754 mL / OUT: 1575 mL / NET: 1179 mL    2018 07:  -  2018 14:30  --------------------------------------------------------  IN: 120 mL / OUT: 250 mL / NET: -130 mL      Last Menstrual Period      PHYSICAL EXAM:  GENERAL: NAD, well-groomed, well-developed  HEAD:  Atraumatic, Normocephalic  EYES: EOMI, PERRLA, conjunctiva and sclera clear  ENMT: No tonsillar erythema, exudates, or enlargement; Moist mucous membranes, Good dentition, No lesions  NECK: Supple, No JVD, Normal thyroid  NERVOUS SYSTEM:  Alert & Oriented X3, Good concentration; Motor Strength 5/5 B/L upper and lower extremities; DTRs 2+ intact and symmetric  CHEST/LUNG: Clear to percussion bilaterally; No rales, rhonchi, wheezing, or rubs  HEART: Regular rate and rhythm; No murmurs, rubs, or gallops  ABDOMEN: Soft, Nontender, Nondistended; Bowel sounds present  EXTREMITIES:  2+ Peripheral Pulses, No clubbing, cyanosis, or edema  LYMPH: No lymphadenopathy noted  SKIN: No rashes or lesions    Consultant(s) Notes Reviewed:  [x ] YES  [ ] NO  Care Discussed with Consultants/Other Providers [ x] YES  [ ] NO  Name of Consultant  LABS:                        10.0   9.4   )-----------( 278      ( 2018 02:02 )             30.5     06-08    135  |  101  |  6<L>  ----------------------------<  113<H>  3.4<L>   |  23  |  0.39<L>    Ca    8.7      2018 02:02  Phos  2.5     06-  Mg     1.7     06-08        Urinalysis Basic - ( 2018 15:19 )    Color: Yellow / Appearance: Clear / S.024 / pH: x  Gluc: x / Ketone: Large  / Bili: Negative / Urobili: Negative   Blood: x / Protein: Trace / Nitrite: Negative   Leuk Esterase: Large / RBC: 0-2 /HPF / WBC 3-5 /HPF   Sq Epi: x / Non Sq Epi: OCC /HPF / Bacteria: Few /HPF      CAPILLARY BLOOD GLUCOSE      POCT Blood Glucose.: 72 mg/dL (2018 12:10)  POCT Blood Glucose.: 88 mg/dL (2018 08:35)  POCT Blood Glucose.: 111 mg/dL (2018 01:50)  POCT Blood Glucose.: 140 mg/dL (2018 22:04)  POCT Blood Glucose.: 153 mg/dL (2018 18:45)  POCT Blood Glucose.: 199 mg/dL (2018 17:41)  POCT Blood Glucose.: 66 mg/dL (2018 17:17)  POCT Blood Glucose.: 160 mg/dL (2018 15:00)      ABG - ( 2018 05:45 )  pH, Arterial: 7.46  pH, Blood: x     /  pCO2: 31    /  pO2: 109   / HCO3: 22    / Base Excess: -.8   /  SaO2: 99                Urinalysis Basic - ( 2018 15:19 )    Color: Yellow / Appearance: Clear / S.024 / pH: x  Gluc: x / Ketone: Large  / Bili: Negative / Urobili: Negative   Blood: x / Protein: Trace / Nitrite: Negative   Leuk Esterase: Large / RBC: 0-2 /HPF / WBC 3-5 /HPF   Sq Epi: x / Non Sq Epi: OCC /HPF / Bacteria: Few /HPF        RADIOLOGY & ADDITIONAL TESTS:  EKG :     Imaging Personally Reviewed:  [ ] YES  [ ] NO  HEALTH ISSUES - PROBLEM Dx:  DKA (diabetic ketoacidoses): DKA (diabetic ketoacidoses)  Diabetes mellitus type 2, insulin dependent: Diabetes mellitus type 2, insulin dependent  Urinary tract infection without hematuria, site unspecified: Urinary tract infection without hematuria, site unspecified  Ketoacidosis: Ketoacidosis INTERNAL MEDICINE ACCEPTANCE NOTE  :     Patient is a 68y old  Male who presents with a chief complaint of DKA (2018 22:17)      INTERVAL HPI/OVERNIGHT EVENTS:    68M with h/o HTN, DMII (on Lantus, Invokana), HLD, Anxiety on PRN Alprazolam, stage 4 Neuroendocrine CA (no on chemo, s/p radiation 2 wks prior), currently awaiting trial therapy initially presented to the hospital  on 18 with c/o general weakness and post-prandial pre-syncopal episode with no LOC, and dizziness in the setting of several days of reported insulin non-adherance.  Patient was also reported to have poor appetite, increased urination with no dysuria or fever.   Of note patient had CT scan on  showing metastasis to liver and new nodule in lung suggestive of metastasis. Family notified pt's oncologist (Dr. Dick) @ [facility] of current symptoms and pt was advised to report to ER for further evaluation. In the ED, pt was noted to be in DKA with an AG of 31 with blood glucose of 198, decreased bicarb, mild acidosis with BHB of 6.6. In the ER, recevied Ceftriaxone, Humalog 6U, Ativan, 2L fluids. Pt was subsequently admitted to the MICU where he was started on D10 and insulin gtt for DKA with FS q1h and BMP q4h.  Patient's electrolytes were  repleted. Upon closure of his AG , pt was   restarted on Lantus 10U w/ ISS, insulin gtt was d/c'ed and pt was started on oral diet. On , pt noted to have AG of 11, BHB of 0.4. On 18 pt was deemed medically optimized for step down to medicine floor for continued care.   Patient was noted to have positive UA and was treated empirically with ceftriaxone for UTI.      Medications:MEDICATIONS  (STANDING):  cefTRIAXone   IVPB 1 Gram(s) IV Intermittent every 24 hours  dextrose 5%. 1000 milliLiter(s) (50 mL/Hr) IV Continuous <Continuous>  heparin  Injectable 5000 Unit(s) SubCutaneous every 8 hours  insulin glargine Injectable (LANTUS) 10 Unit(s) SubCutaneous <User Schedule>  insulin lispro (HumaLOG) corrective regimen sliding scale   SubCutaneous three times a day before meals  insulin lispro (HumaLOG) corrective regimen sliding scale   SubCutaneous at bedtime    MEDICATIONS  (PRN):  ALPRAZolam 0.5 milliGRAM(s) Oral daily PRN anxiety  morphine  IR 15 milliGRAM(s) Oral every 4 hours PRN Severe Pain (7 - 10)      Allergies: Allergies    No Known Allergies    Intolerances        REVIEW OF SYSTEMS:  CONSTITUTIONAL: No fever  EYES: No visual disturbances  ENMT:  No throat pain  NECK: No pain or stiffness  RESPIRATORY: No cough, wheezing, chills or hemoptysis; No shortness of breath  CARDIOVASCULAR: No chest pain, palpitations, dizziness, or leg swelling  GASTROINTESTINAL: No abdominal or epigastric pain. No nausea, vomiting, or hematemesis; No diarrhea   NEUROLOGICAL: No headaches, pos general weakness   PSYCHIATRIC: h/o anxiety      T(C): 36.4 (18 @ 11:17), Max: 37.2 (18 @ 20:00)  HR: 106 (18 @ 11:17) (104 - 122)  BP: 119/66 (18 @ 11:17) (114/66 - 145/81)  RR: 18 (18 @ 11:17) (15 - 29)  SpO2: 97% (18 @ 11:17) (96% - 100%)  Wt(kg): --Vital Signs Last 24 Hrs  T(C): 36.4 (2018 11:17), Max: 37.2 (2018 20:00)  T(F): 97.6 (2018 11:17), Max: 98.9 (2018 20:00)  HR: 106 (2018 11:17) (104 - 122)  BP: 119/66 (2018 11:17) (114/66 - 145/81)  BP(mean): 84 (2018 10:00) (84 - 106)  RR: 18 (2018 11:17) (15 - 29)  SpO2: 97% (2018 11:17) (96% - 100%)  I&O's Summary    2018 07:01  -  2018 07:00  --------------------------------------------------------  IN: 2754 mL / OUT: 1575 mL / NET: 1179 mL    2018 07:01  -  2018 14:30  --------------------------------------------------------  IN: 120 mL / OUT: 250 mL / NET: -130 mL      Last Menstrual Period      PHYSICAL EXAM:  GENERAL: NAD, well-groomed, well-developed  HEAD:  Atraumatic, Normocephalic  NECK: Supple, No JVD, Normal thyroid  NERVOUS SYSTEM:  Alert & Oriented X 2 ( place and person)   CHEST/LUNG: Clear to percussion bilaterally; No rales, rhonchi, wheezing, or rubs  HEART: Regular rate and rhythm; No murmurs, rubs, or gallops  ABDOMEN: Soft, Nontender, Nondistended; Bowel sounds present  EXTREMITIES:  2+ Peripheral Pulses, No clubbing, cyanosis, or edema      Consultant(s) Notes Reviewed:  [x ] YES  [ ] NO  Care Discussed with Consultants/Other Providers [ x] YES  [ ] NO  Name of Consultant  LABS:                        10.0   9.4   )-----------( 278      ( 2018 02:02 )             30.5     06-08    135  |  101  |  6<L>  ----------------------------<  113<H>  3.4<L>   |  23  |  0.39<L>    Ca    8.7      2018 02:02  Phos  2.5     06-08  Mg     1.7     06-08        Urinalysis Basic - ( 2018 15:19 )    Color: Yellow / Appearance: Clear / S.024 / pH: x  Gluc: x / Ketone: Large  / Bili: Negative / Urobili: Negative   Blood: x / Protein: Trace / Nitrite: Negative   Leuk Esterase: Large / RBC: 0-2 /HPF / WBC 3-5 /HPF   Sq Epi: x / Non Sq Epi: OCC /HPF / Bacteria: Few /HPF      CAPILLARY BLOOD GLUCOSE      POCT Blood Glucose.: 72 mg/dL (2018 12:10)  POCT Blood Glucose.: 88 mg/dL (2018 08:35)  POCT Blood Glucose.: 111 mg/dL (2018 01:50)  POCT Blood Glucose.: 140 mg/dL (2018 22:04)  POCT Blood Glucose.: 153 mg/dL (2018 18:45)  POCT Blood Glucose.: 199 mg/dL (2018 17:41)  POCT Blood Glucose.: 66 mg/dL (2018 17:17)  POCT Blood Glucose.: 160 mg/dL (2018 15:00)      ABG - ( 2018 05:45 )  pH, Arterial: 7.46  pH, Blood: x     /  pCO2: 31    /  pO2: 109   / HCO3: 22    / Base Excess: -.8   /  SaO2: 99                Urinalysis Basic - ( 2018 15:19 )    Color: Yellow / Appearance: Clear / S.024 / pH: x  Gluc: x / Ketone: Large  / Bili: Negative / Urobili: Negative   Blood: x / Protein: Trace / Nitrite: Negative   Leuk Esterase: Large / RBC: 0-2 /HPF / WBC 3-5 /HPF   Sq Epi: x / Non Sq Epi: OCC /HPF / Bacteria: Few /HPF        RADIOLOGY & ADDITIONAL TESTS:  EKG :     Imaging Personally Reviewed:  [ ] YES  [ ] NO  HEALTH ISSUES - PROBLEM Dx:  DKA (diabetic ketoacidoses): DKA (diabetic ketoacidoses)  Diabetes mellitus type 2, insulin dependent: Diabetes mellitus type 2, insulin dependent  Urinary tract infection without hematuria, site unspecified: Urinary tract infection without hematuria, site unspecified  Ketoacidosis: Ketoacidosis

## 2018-06-08 NOTE — CHART NOTE - NSCHARTNOTEFT_GEN_A_CORE
Upon Nutritional Assessment by the Registered Dietitian your patient was determined to meet criteria / has evidence of the following diagnosis/diagnoses:          [ ]  Mild Protein Calorie Malnutrition        [ ]  Moderate Protein Calorie Malnutrition        [x ] Severe Protein Calorie Malnutrition        [ ] Unspecified Protein Calorie Malnutrition        [ ] Underweight / BMI <19        [ ] Morbid Obesity / BMI > 40      Findings as based on:  [x ] Comprehensive nutrition assessment   [x ] Nutrition Focused Physical Exam  [ ] Other:       Nutrition Plan/Recommendations:  recommend adding Glucerna 2 times/day         PROVIDER Section:     By signing this assessment you are acknowledging and agree with the diagnosis/diagnoses assigned by the Registered Dietitian    Comments:

## 2018-06-09 LAB
ACTH SER-ACNC: 24 PG/ML — SIGNIFICANT CHANGE UP (ref 5–46)
ALBUMIN SERPL ELPH-MCNC: 2.4 G/DL — LOW (ref 3.3–5)
ALP SERPL-CCNC: 526 U/L — HIGH (ref 40–120)
ALT FLD-CCNC: 20 U/L — SIGNIFICANT CHANGE UP (ref 10–45)
ANION GAP SERPL CALC-SCNC: 14 MMOL/L — SIGNIFICANT CHANGE UP (ref 5–17)
AST SERPL-CCNC: 70 U/L — HIGH (ref 10–40)
BASOPHILS # BLD AUTO: 0.01 K/UL — SIGNIFICANT CHANGE UP (ref 0–0.2)
BASOPHILS NFR BLD AUTO: 0.1 % — SIGNIFICANT CHANGE UP (ref 0–2)
BILIRUB SERPL-MCNC: 0.5 MG/DL — SIGNIFICANT CHANGE UP (ref 0.2–1.2)
BUN SERPL-MCNC: 8 MG/DL — SIGNIFICANT CHANGE UP (ref 7–23)
CALCIUM SERPL-MCNC: 8.3 MG/DL — LOW (ref 8.4–10.5)
CHLORIDE SERPL-SCNC: 96 MMOL/L — SIGNIFICANT CHANGE UP (ref 96–108)
CHOLEST SERPL-MCNC: 94 MG/DL — SIGNIFICANT CHANGE UP (ref 10–199)
CO2 SERPL-SCNC: 25 MMOL/L — SIGNIFICANT CHANGE UP (ref 22–31)
CREAT SERPL-MCNC: 0.4 MG/DL — LOW (ref 0.5–1.3)
EOSINOPHIL # BLD AUTO: 0.08 K/UL — SIGNIFICANT CHANGE UP (ref 0–0.5)
EOSINOPHIL NFR BLD AUTO: 0.9 % — SIGNIFICANT CHANGE UP (ref 0–6)
FERRITIN SERPL-MCNC: 1310 NG/ML — HIGH (ref 30–400)
GLUCOSE BLDC GLUCOMTR-MCNC: 112 MG/DL — HIGH (ref 70–99)
GLUCOSE BLDC GLUCOMTR-MCNC: 142 MG/DL — HIGH (ref 70–99)
GLUCOSE BLDC GLUCOMTR-MCNC: 177 MG/DL — HIGH (ref 70–99)
GLUCOSE BLDC GLUCOMTR-MCNC: 93 MG/DL — SIGNIFICANT CHANGE UP (ref 70–99)
GLUCOSE SERPL-MCNC: 63 MG/DL — LOW (ref 70–99)
HCT VFR BLD CALC: 30.3 % — LOW (ref 39–50)
HDLC SERPL-MCNC: 17 MG/DL — LOW (ref 40–125)
HGB BLD-MCNC: 9.5 G/DL — LOW (ref 13–17)
IMM GRANULOCYTES NFR BLD AUTO: 0.7 % — SIGNIFICANT CHANGE UP (ref 0–1.5)
IRON SATN MFR SERPL: 16 % — SIGNIFICANT CHANGE UP (ref 16–55)
IRON SATN MFR SERPL: 16 UG/DL — LOW (ref 45–165)
LIPID PNL WITH DIRECT LDL SERPL: 46 MG/DL — SIGNIFICANT CHANGE UP
LYMPHOCYTES # BLD AUTO: 0.89 K/UL — LOW (ref 1–3.3)
LYMPHOCYTES # BLD AUTO: 10 % — LOW (ref 13–44)
MAGNESIUM SERPL-MCNC: 1.7 MG/DL — SIGNIFICANT CHANGE UP (ref 1.6–2.6)
MCHC RBC-ENTMCNC: 25.9 PG — LOW (ref 27–34)
MCHC RBC-ENTMCNC: 31.4 GM/DL — LOW (ref 32–36)
MCV RBC AUTO: 82.6 FL — SIGNIFICANT CHANGE UP (ref 80–100)
MONOCYTES # BLD AUTO: 0.78 K/UL — SIGNIFICANT CHANGE UP (ref 0–0.9)
MONOCYTES NFR BLD AUTO: 8.8 % — SIGNIFICANT CHANGE UP (ref 2–14)
NEUTROPHILS # BLD AUTO: 7.06 K/UL — SIGNIFICANT CHANGE UP (ref 1.8–7.4)
NEUTROPHILS NFR BLD AUTO: 79.5 % — HIGH (ref 43–77)
PLATELET # BLD AUTO: 272 K/UL — SIGNIFICANT CHANGE UP (ref 150–400)
POTASSIUM SERPL-MCNC: 3.6 MMOL/L — SIGNIFICANT CHANGE UP (ref 3.5–5.3)
POTASSIUM SERPL-SCNC: 3.6 MMOL/L — SIGNIFICANT CHANGE UP (ref 3.5–5.3)
PROT SERPL-MCNC: 6.6 G/DL — SIGNIFICANT CHANGE UP (ref 6–8.3)
RBC # BLD: 3.67 M/UL — LOW (ref 4.2–5.8)
RBC # FLD: 16.7 % — HIGH (ref 10.3–14.5)
SODIUM SERPL-SCNC: 135 MMOL/L — SIGNIFICANT CHANGE UP (ref 135–145)
TIBC SERPL-MCNC: 103 UG/DL — LOW (ref 220–430)
TOTAL CHOLESTEROL/HDL RATIO MEASUREMENT: 5.5 RATIO — SIGNIFICANT CHANGE UP (ref 3.4–9.6)
TRIGL SERPL-MCNC: 156 MG/DL — HIGH (ref 10–149)
TSH SERPL-MCNC: 1.9 UIU/ML — SIGNIFICANT CHANGE UP (ref 0.27–4.2)
UIBC SERPL-MCNC: 87 UG/DL — LOW (ref 110–370)
VIT B12 SERPL-MCNC: 1143 PG/ML — SIGNIFICANT CHANGE UP (ref 232–1245)
WBC # BLD: 8.88 K/UL — SIGNIFICANT CHANGE UP (ref 3.8–10.5)
WBC # FLD AUTO: 8.88 K/UL — SIGNIFICANT CHANGE UP (ref 3.8–10.5)

## 2018-06-09 PROCEDURE — 99233 SBSQ HOSP IP/OBS HIGH 50: CPT

## 2018-06-09 RX ADMIN — HEPARIN SODIUM 5000 UNIT(S): 5000 INJECTION INTRAVENOUS; SUBCUTANEOUS at 21:58

## 2018-06-09 RX ADMIN — MORPHINE SULFATE 15 MILLIGRAM(S): 50 CAPSULE, EXTENDED RELEASE ORAL at 15:11

## 2018-06-09 RX ADMIN — HEPARIN SODIUM 5000 UNIT(S): 5000 INJECTION INTRAVENOUS; SUBCUTANEOUS at 14:13

## 2018-06-09 RX ADMIN — MORPHINE SULFATE 15 MILLIGRAM(S): 50 CAPSULE, EXTENDED RELEASE ORAL at 22:42

## 2018-06-09 RX ADMIN — INSULIN GLARGINE 8 UNIT(S): 100 INJECTION, SOLUTION SUBCUTANEOUS at 17:30

## 2018-06-09 RX ADMIN — MORPHINE SULFATE 15 MILLIGRAM(S): 50 CAPSULE, EXTENDED RELEASE ORAL at 14:11

## 2018-06-09 RX ADMIN — MORPHINE SULFATE 15 MILLIGRAM(S): 50 CAPSULE, EXTENDED RELEASE ORAL at 08:06

## 2018-06-09 RX ADMIN — CEFTRIAXONE 100 GRAM(S): 500 INJECTION, POWDER, FOR SOLUTION INTRAMUSCULAR; INTRAVENOUS at 17:30

## 2018-06-09 RX ADMIN — HEPARIN SODIUM 5000 UNIT(S): 5000 INJECTION INTRAVENOUS; SUBCUTANEOUS at 05:12

## 2018-06-09 RX ADMIN — MORPHINE SULFATE 15 MILLIGRAM(S): 50 CAPSULE, EXTENDED RELEASE ORAL at 21:58

## 2018-06-09 RX ADMIN — MORPHINE SULFATE 15 MILLIGRAM(S): 50 CAPSULE, EXTENDED RELEASE ORAL at 09:06

## 2018-06-09 NOTE — PROGRESS NOTE ADULT - PROBLEM SELECTOR PLAN 7
Patient already has an apt with the Oncologist on 6/15/18 for initiation of immunotherapy as discussed with the Oncologist   Labs will be ordered by the Oncologist before initiation of therapy.  Oncologist will schedule post hospital f/up once patient is d/c home

## 2018-06-09 NOTE — PROGRESS NOTE ADULT - ASSESSMENT
69 yo Romanian speaking male PMHX HTN, type 2 diabetes mellitus (on Lantus), HLD, Anxiety on PRN Alprazolam,  and stage 4 Neuroendocrine CA (not on current chemo, finished radiation 2 weeks prior, currently awaiting trial therapy) was admitted to the hospital on 6/6/18 due to c/o weakness, presyncope, dizziness was found to have elevated anion gap to 31, decreased bicarb, glucosuria, mild acidosis with increased BHB of 6.6 likely consistent with mild DKA which has since resolved.

## 2018-06-09 NOTE — PROGRESS NOTE ADULT - SUBJECTIVE AND OBJECTIVE BOX
INTERNAL MEDICINE ACCEPTANCE NOTE  :     Patient is a 68y old  Male who presents with a chief complaint of DKA (06 Jun 2018 22:17)      Subjective Patient feels ok, no new complaints.   ROS other wise negative.     T(C): 36.9 (06-09-18 @ 13:00), Max: 36.9 (06-09-18 @ 13:00)  HR: 110 (06-09-18 @ 13:00) (110 - 110)  BP: 138/78 (06-09-18 @ 13:00) (138/78 - 138/78)  RR: 18 (06-09-18 @ 13:00) (18 - 18)  SpO2: 97% (06-09-18 @ 13:00) (97% - 97%)    MEDICATIONS  (STANDING):  cefTRIAXone   IVPB 1 Gram(s) IV Intermittent every 24 hours  dextrose 5%. 1000 milliLiter(s) (50 mL/Hr) IV Continuous <Continuous>  heparin  Injectable 5000 Unit(s) SubCutaneous every 8 hours  insulin glargine Injectable (LANTUS) 8 Unit(s) SubCutaneous <User Schedule>  insulin lispro (HumaLOG) corrective regimen sliding scale   SubCutaneous three times a day before meals  insulin lispro (HumaLOG) corrective regimen sliding scale   SubCutaneous at bedtime    MEDICATIONS  (PRN):  ALPRAZolam 0.5 milliGRAM(s) Oral daily PRN anxiety  morphine  IR 15 milliGRAM(s) Oral every 4 hours PRN Severe Pain (7 - 10)      REVIEW OF SYSTEMS:  CONSTITUTIONAL: No fever  EYES: No visual disturbances  ENMT:  No throat pain  NECK: No pain or stiffness  RESPIRATORY: No cough, wheezing, chills or hemoptysis; No shortness of breath  CARDIOVASCULAR: No chest pain, palpitations, dizziness, or leg swelling  GASTROINTESTINAL: No abdominal or epigastric pain. No nausea, vomiting, or hematemesis; No diarrhea   NEUROLOGICAL: No headaches, pos general weakness   PSYCHIATRIC: h/o anxiety          PHYSICAL EXAM:  GENERAL: NAD, well-groomed, well-developed  HEAD:  Atraumatic, Normocephalic  NECK: Supple, No JVD, Normal thyroid  NERVOUS SYSTEM:  Alert & Oriented X 2 ( place and person)   CHEST/LUNG: Clear to percussion bilaterally; No rales, rhonchi, wheezing, or rubs  HEART: Regular rate and rhythm; No murmurs, rubs, or gallops  ABDOMEN: Soft, Nontender, Nondistended; Bowel sounds present  EXTREMITIES:  2+ Peripheral Pulses, No clubbing, cyanosis, or edema      Consultant(s) Notes Reviewed:  [x ] YES  [ ] NO                        9.5    8.88  )-----------( 272      ( 09 Jun 2018 08:25 )             30.3           LIVER FUNCTIONS - ( 09 Jun 2018 06:47 )  Alb: 2.4 g/dL / Pro: 6.6 g/dL / ALK PHOS: 526 U/L / ALT: 20 U/L / AST: 70 U/L / GGT: x             135|96|8<63  3.6|25|0.40  8.3,1.7,--  06-09 @ 06:47    CAPILLARY BLOOD GLUCOSE      CAPILLARY BLOOD GLUCOSE      POCT Blood Glucose.: 142 mg/dL (09 Jun 2018 16:52)  POCT Blood Glucose.: 93 mg/dL (09 Jun 2018 12:22)  POCT Blood Glucose.: 112 mg/dL (09 Jun 2018 08:08)  POCT Blood Glucose.: 118 mg/dL (08 Jun 2018 21:17)    ABG - ( 07 Jun 2018 05:45 )  pH, Arterial: 7.46  pH, Blood: x     /  pCO2: 31    /  pO2: 109   / HCO3: 22    / Base Excess: -.8   /  SaO2: 99                RADIOLOGY & ADDITIONAL TESTS:  EKG :     Imaging Personally Reviewed:  [ ] YES  [ ] NO  HEALTH ISSUES - PROBLEM Dx:  DKA (diabetic ketoacidoses): DKA (diabetic ketoacidoses)  Diabetes mellitus type 2, insulin dependent: Diabetes mellitus type 2, insulin dependent  Urinary tract infection without hematuria, site unspecified: Urinary tract infection without hematuria, site unspecified  Ketoacidosis: Ketoacidosis

## 2018-06-09 NOTE — PROGRESS NOTE ADULT - PROBLEM SELECTOR PLAN 1
s/p ketoacidosis in the setting of SGL2 inhibitor use.  Now resolved with closed AG, endo following, monitor   Patient is currently on  Lantus 10U w/ ISS,

## 2018-06-10 DIAGNOSIS — L89.102 PRESSURE ULCER OF UNSPECIFIED PART OF BACK, STAGE 2: ICD-10-CM

## 2018-06-10 LAB
GLUCOSE BLDC GLUCOMTR-MCNC: 142 MG/DL — HIGH (ref 70–99)
GLUCOSE BLDC GLUCOMTR-MCNC: 164 MG/DL — HIGH (ref 70–99)
GLUCOSE BLDC GLUCOMTR-MCNC: 208 MG/DL — HIGH (ref 70–99)
GLUCOSE BLDC GLUCOMTR-MCNC: 93 MG/DL — SIGNIFICANT CHANGE UP (ref 70–99)

## 2018-06-10 PROCEDURE — 99233 SBSQ HOSP IP/OBS HIGH 50: CPT

## 2018-06-10 RX ORDER — POLYETHYLENE GLYCOL 3350 17 G/17G
17 POWDER, FOR SOLUTION ORAL DAILY
Qty: 0 | Refills: 0 | Status: DISCONTINUED | OUTPATIENT
Start: 2018-06-10 | End: 2018-06-12

## 2018-06-10 RX ORDER — SENNA PLUS 8.6 MG/1
2 TABLET ORAL AT BEDTIME
Qty: 0 | Refills: 0 | Status: DISCONTINUED | OUTPATIENT
Start: 2018-06-10 | End: 2018-06-12

## 2018-06-10 RX ORDER — DOCUSATE SODIUM 100 MG
100 CAPSULE ORAL THREE TIMES A DAY
Qty: 0 | Refills: 0 | Status: DISCONTINUED | OUTPATIENT
Start: 2018-06-10 | End: 2018-06-12

## 2018-06-10 RX ADMIN — SENNA PLUS 2 TABLET(S): 8.6 TABLET ORAL at 21:01

## 2018-06-10 RX ADMIN — MORPHINE SULFATE 15 MILLIGRAM(S): 50 CAPSULE, EXTENDED RELEASE ORAL at 19:49

## 2018-06-10 RX ADMIN — Medication 100 MILLIGRAM(S): at 21:01

## 2018-06-10 RX ADMIN — Medication 100 MILLIGRAM(S): at 14:27

## 2018-06-10 RX ADMIN — MORPHINE SULFATE 15 MILLIGRAM(S): 50 CAPSULE, EXTENDED RELEASE ORAL at 08:53

## 2018-06-10 RX ADMIN — INSULIN GLARGINE 8 UNIT(S): 100 INJECTION, SOLUTION SUBCUTANEOUS at 16:00

## 2018-06-10 RX ADMIN — HEPARIN SODIUM 5000 UNIT(S): 5000 INJECTION INTRAVENOUS; SUBCUTANEOUS at 05:46

## 2018-06-10 RX ADMIN — Medication 2: at 17:11

## 2018-06-10 RX ADMIN — HEPARIN SODIUM 5000 UNIT(S): 5000 INJECTION INTRAVENOUS; SUBCUTANEOUS at 14:25

## 2018-06-10 RX ADMIN — HEPARIN SODIUM 5000 UNIT(S): 5000 INJECTION INTRAVENOUS; SUBCUTANEOUS at 21:01

## 2018-06-10 RX ADMIN — MORPHINE SULFATE 15 MILLIGRAM(S): 50 CAPSULE, EXTENDED RELEASE ORAL at 20:36

## 2018-06-10 RX ADMIN — MORPHINE SULFATE 15 MILLIGRAM(S): 50 CAPSULE, EXTENDED RELEASE ORAL at 07:53

## 2018-06-10 NOTE — PROGRESS NOTE ADULT - PROBLEM SELECTOR PLAN 8
cont Alprazolam PRN Patient already has an apt with the Oncologist on 6/15/18 for initiation of immunotherapy as discussed with the Oncologist   Labs will be ordered by the Oncologist before initiation of therapy.  Oncologist will schedule post hospital f/up once patient is d/c home

## 2018-06-10 NOTE — PROGRESS NOTE ADULT - ASSESSMENT
69 yo Greenlandic speaking male PMHX HTN, type 2 diabetes mellitus (on Lantus), HLD, Anxiety on PRN Alprazolam,  and stage 4 Neuroendocrine CA (not on current chemo, finished radiation 2 weeks prior, currently awaiting trial therapy) was admitted to the hospital on 6/6/18 due to c/o weakness, presyncope, dizziness was found to have elevated anion gap to 31, decreased bicarb, glucosuria, mild acidosis with increased BHB of 6.6 likely consistent with mild DKA which has since resolved.

## 2018-06-10 NOTE — PROGRESS NOTE ADULT - PROBLEM SELECTOR PLAN 7
Patient already has an apt with the Oncologist on 6/15/18 for initiation of immunotherapy as discussed with the Oncologist   Labs will be ordered by the Oncologist before initiation of therapy.  Oncologist will schedule post hospital f/up once patient is d/c home Anemia of chronic disease in the setting of Neuroendocrine tumour.

## 2018-06-10 NOTE — PHYSICAL THERAPY INITIAL EVALUATION ADULT - PERTINENT HX OF CURRENT PROBLEM, REHAB EVAL
Pt is 68 y.o. male h/o 2DM, neuroendocrine CA( not currently on chemo, finished radiation 2 wks ago, currnetly awaiting trial therapy), c/o weakness, pre syncope episode after eating, never lost consciousness or trauma. Pt found to have mild acidosis with increased BHB of 6.6 likely consistent with mild DKA

## 2018-06-10 NOTE — PROGRESS NOTE ADULT - SUBJECTIVE AND OBJECTIVE BOX
INTERNAL MEDICINE ACCEPTANCE NOTE  :     Patient is a 68y old  Male who presents with a chief complaint of DKA (06 Jun 2018 22:17)      Subjective Patient feels ok, no new complaints.   ROS other wise negative.     T(C): 36.9 (06-10-18 @ 05:07), Max: 36.9 (06-10-18 @ 05:07)  HR: 103 (06-10-18 @ 05:07) (103 - 103)  BP: 128/72 (06-10-18 @ 05:07) (128/72 - 128/72)  RR: 18 (06-10-18 @ 05:07) (18 - 18)  SpO2: 95% (06-10-18 @ 05:07) (95% - 95%)    MEDICATIONS  (STANDING):  dextrose 5%. 1000 milliLiter(s) (50 mL/Hr) IV Continuous <Continuous>  heparin  Injectable 5000 Unit(s) SubCutaneous every 8 hours  insulin glargine Injectable (LANTUS) 8 Unit(s) SubCutaneous <User Schedule>  insulin lispro (HumaLOG) corrective regimen sliding scale   SubCutaneous three times a day before meals  insulin lispro (HumaLOG) corrective regimen sliding scale   SubCutaneous at bedtime    MEDICATIONS  (PRN):  ALPRAZolam 0.5 milliGRAM(s) Oral daily PRN anxiety  morphine  IR 15 milliGRAM(s) Oral every 4 hours PRN Severe Pain (7 - 10)      REVIEW OF SYSTEMS:  CONSTITUTIONAL: No fever  EYES: No visual disturbances  ENMT:  No throat pain  NECK: No pain or stiffness  RESPIRATORY: No cough, wheezing, chills or hemoptysis; No shortness of breath  CARDIOVASCULAR: No chest pain, palpitations, dizziness, or leg swelling  GASTROINTESTINAL: No abdominal or epigastric pain. No nausea, vomiting, or hematemesis; No diarrhea   NEUROLOGICAL: No headaches, pos general weakness   PSYCHIATRIC: h/o anxiety          PHYSICAL EXAM:  GENERAL: NAD, well-groomed, well-developed  HEAD:  Atraumatic, Normocephalic  NECK: Supple, No JVD, Normal thyroid  NERVOUS SYSTEM:  Alert & Oriented X 2 ( place and person)   CHEST/LUNG: Clear to percussion bilaterally; No rales, rhonchi, wheezing, or rubs  HEART: Regular rate and rhythm; No murmurs, rubs, or gallops  ABDOMEN: Soft, Nontender, Nondistended; Bowel sounds present  EXTREMITIES:  2+ Peripheral Pulses, No clubbing, cyanosis, or edema      Consultant(s) Notes Reviewed:  [x ] YES  [ ] NO                                              9.5    8.88  )-----------( 272      ( 09 Jun 2018 08:25 )             30.3           LIVER FUNCTIONS - ( 09 Jun 2018 06:47 )  Alb: 2.4 g/dL / Pro: 6.6 g/dL / ALK PHOS: 526 U/L / ALT: 20 U/L / AST: 70 U/L / GGT: x               CAPILLARY BLOOD GLUCOSE      CAPILLARY BLOOD GLUCOSE      POCT Blood Glucose.: 142 mg/dL (09 Jun 2018 16:52)  POCT Blood Glucose.: 93 mg/dL (09 Jun 2018 12:22)  POCT Blood Glucose.: 112 mg/dL (09 Jun 2018 08:08)  POCT Blood Glucose.: 118 mg/dL (08 Jun 2018 21:17)    ABG - ( 07 Jun 2018 05:45 )  pH, Arterial: 7.46  pH, Blood: x     /  pCO2: 31    /  pO2: 109   / HCO3: 22    / Base Excess: -.8   /  SaO2: 99                RADIOLOGY & ADDITIONAL TESTS:  EKG :     Imaging Personally Reviewed:  [ ] YES  [ ] NO  HEALTH ISSUES - PROBLEM Dx:  DKA (diabetic ketoacidoses): DKA (diabetic ketoacidoses)  Diabetes mellitus type 2, insulin dependent: Diabetes mellitus type 2, insulin dependent  Urinary tract infection without hematuria, site unspecified: Urinary tract infection without hematuria, site unspecified  Ketoacidosis: Ketoacidosis

## 2018-06-10 NOTE — PROGRESS NOTE ADULT - PROBLEM SELECTOR PLAN 1
s/p ketoacidosis in the setting of SGL2 inhibitor use.  Now resolved with closed AG, endo following, monitor   Patient is currently on  Lantus 8 units w/ ISS,

## 2018-06-11 ENCOUNTER — TRANSCRIPTION ENCOUNTER (OUTPATIENT)
Age: 68
End: 2018-06-11

## 2018-06-11 LAB
ALBUMIN SERPL ELPH-MCNC: 2.2 G/DL — LOW (ref 3.3–5)
ALP SERPL-CCNC: 634 U/L — HIGH (ref 40–120)
ALT FLD-CCNC: 23 U/L — SIGNIFICANT CHANGE UP (ref 10–45)
ANION GAP SERPL CALC-SCNC: 19 MMOL/L — HIGH (ref 5–17)
AST SERPL-CCNC: 80 U/L — HIGH (ref 10–40)
BILIRUB SERPL-MCNC: 0.6 MG/DL — SIGNIFICANT CHANGE UP (ref 0.2–1.2)
BUN SERPL-MCNC: 9 MG/DL — SIGNIFICANT CHANGE UP (ref 7–23)
CALCIUM SERPL-MCNC: 8.6 MG/DL — SIGNIFICANT CHANGE UP (ref 8.4–10.5)
CHLORIDE SERPL-SCNC: 92 MMOL/L — LOW (ref 96–108)
CO2 SERPL-SCNC: 23 MMOL/L — SIGNIFICANT CHANGE UP (ref 22–31)
CREAT SERPL-MCNC: 0.4 MG/DL — LOW (ref 0.5–1.3)
CULTURE RESULTS: SIGNIFICANT CHANGE UP
CULTURE RESULTS: SIGNIFICANT CHANGE UP
GLUCOSE BLDC GLUCOMTR-MCNC: 134 MG/DL — HIGH (ref 70–99)
GLUCOSE BLDC GLUCOMTR-MCNC: 145 MG/DL — HIGH (ref 70–99)
GLUCOSE BLDC GLUCOMTR-MCNC: 235 MG/DL — HIGH (ref 70–99)
GLUCOSE BLDC GLUCOMTR-MCNC: 260 MG/DL — HIGH (ref 70–99)
GLUCOSE SERPL-MCNC: 138 MG/DL — HIGH (ref 70–99)
HCT VFR BLD CALC: 29.9 % — LOW (ref 39–50)
HGB BLD-MCNC: 9.7 G/DL — LOW (ref 13–17)
MCHC RBC-ENTMCNC: 26.5 PG — LOW (ref 27–34)
MCHC RBC-ENTMCNC: 32.4 GM/DL — SIGNIFICANT CHANGE UP (ref 32–36)
MCV RBC AUTO: 81.7 FL — SIGNIFICANT CHANGE UP (ref 80–100)
PLATELET # BLD AUTO: 250 K/UL — SIGNIFICANT CHANGE UP (ref 150–400)
POTASSIUM SERPL-MCNC: 3.7 MMOL/L — SIGNIFICANT CHANGE UP (ref 3.5–5.3)
POTASSIUM SERPL-SCNC: 3.7 MMOL/L — SIGNIFICANT CHANGE UP (ref 3.5–5.3)
PROT SERPL-MCNC: 6.9 G/DL — SIGNIFICANT CHANGE UP (ref 6–8.3)
RBC # BLD: 3.66 M/UL — LOW (ref 4.2–5.8)
RBC # FLD: 17.3 % — HIGH (ref 10.3–14.5)
SODIUM SERPL-SCNC: 134 MMOL/L — LOW (ref 135–145)
SPECIMEN SOURCE: SIGNIFICANT CHANGE UP
SPECIMEN SOURCE: SIGNIFICANT CHANGE UP
WBC # BLD: 7.63 K/UL — SIGNIFICANT CHANGE UP (ref 3.8–10.5)
WBC # FLD AUTO: 7.63 K/UL — SIGNIFICANT CHANGE UP (ref 3.8–10.5)

## 2018-06-11 PROCEDURE — 99233 SBSQ HOSP IP/OBS HIGH 50: CPT

## 2018-06-11 PROCEDURE — 99232 SBSQ HOSP IP/OBS MODERATE 35: CPT

## 2018-06-11 RX ORDER — SODIUM CHLORIDE 9 MG/ML
1000 INJECTION, SOLUTION INTRAVENOUS
Qty: 0 | Refills: 0 | Status: DISCONTINUED | OUTPATIENT
Start: 2018-06-11 | End: 2018-06-12

## 2018-06-11 RX ORDER — DEXTROSE 50 % IN WATER 50 %
25 SYRINGE (ML) INTRAVENOUS ONCE
Qty: 0 | Refills: 0 | Status: DISCONTINUED | OUTPATIENT
Start: 2018-06-11 | End: 2018-06-12

## 2018-06-11 RX ORDER — GLUCAGON INJECTION, SOLUTION 0.5 MG/.1ML
1 INJECTION, SOLUTION SUBCUTANEOUS ONCE
Qty: 0 | Refills: 0 | Status: DISCONTINUED | OUTPATIENT
Start: 2018-06-11 | End: 2018-06-12

## 2018-06-11 RX ORDER — DEXTROSE 50 % IN WATER 50 %
12.5 SYRINGE (ML) INTRAVENOUS ONCE
Qty: 0 | Refills: 0 | Status: DISCONTINUED | OUTPATIENT
Start: 2018-06-11 | End: 2018-06-12

## 2018-06-11 RX ORDER — INSULIN LISPRO 100/ML
2 VIAL (ML) SUBCUTANEOUS
Qty: 0 | Refills: 0 | Status: DISCONTINUED | OUTPATIENT
Start: 2018-06-11 | End: 2018-06-12

## 2018-06-11 RX ORDER — DEXTROSE 50 % IN WATER 50 %
15 SYRINGE (ML) INTRAVENOUS ONCE
Qty: 0 | Refills: 0 | Status: DISCONTINUED | OUTPATIENT
Start: 2018-06-11 | End: 2018-06-12

## 2018-06-11 RX ADMIN — HEPARIN SODIUM 5000 UNIT(S): 5000 INJECTION INTRAVENOUS; SUBCUTANEOUS at 15:16

## 2018-06-11 RX ADMIN — Medication 2 UNIT(S): at 17:44

## 2018-06-11 RX ADMIN — Medication 2: at 17:44

## 2018-06-11 RX ADMIN — Medication 100 MILLIGRAM(S): at 05:33

## 2018-06-11 RX ADMIN — POLYETHYLENE GLYCOL 3350 17 GRAM(S): 17 POWDER, FOR SOLUTION ORAL at 12:39

## 2018-06-11 RX ADMIN — Medication 100 MILLIGRAM(S): at 21:58

## 2018-06-11 RX ADMIN — MORPHINE SULFATE 15 MILLIGRAM(S): 50 CAPSULE, EXTENDED RELEASE ORAL at 22:07

## 2018-06-11 RX ADMIN — MORPHINE SULFATE 15 MILLIGRAM(S): 50 CAPSULE, EXTENDED RELEASE ORAL at 08:58

## 2018-06-11 RX ADMIN — MORPHINE SULFATE 15 MILLIGRAM(S): 50 CAPSULE, EXTENDED RELEASE ORAL at 15:25

## 2018-06-11 RX ADMIN — HEPARIN SODIUM 5000 UNIT(S): 5000 INJECTION INTRAVENOUS; SUBCUTANEOUS at 05:33

## 2018-06-11 RX ADMIN — SENNA PLUS 2 TABLET(S): 8.6 TABLET ORAL at 21:59

## 2018-06-11 RX ADMIN — Medication 3: at 12:37

## 2018-06-11 RX ADMIN — INSULIN GLARGINE 8 UNIT(S): 100 INJECTION, SOLUTION SUBCUTANEOUS at 15:22

## 2018-06-11 RX ADMIN — MORPHINE SULFATE 15 MILLIGRAM(S): 50 CAPSULE, EXTENDED RELEASE ORAL at 16:25

## 2018-06-11 RX ADMIN — HEPARIN SODIUM 5000 UNIT(S): 5000 INJECTION INTRAVENOUS; SUBCUTANEOUS at 22:00

## 2018-06-11 RX ADMIN — MORPHINE SULFATE 15 MILLIGRAM(S): 50 CAPSULE, EXTENDED RELEASE ORAL at 07:58

## 2018-06-11 RX ADMIN — Medication 100 MILLIGRAM(S): at 15:16

## 2018-06-11 RX ADMIN — MORPHINE SULFATE 15 MILLIGRAM(S): 50 CAPSULE, EXTENDED RELEASE ORAL at 22:30

## 2018-06-11 NOTE — PROGRESS NOTE ADULT - PROBLEM SELECTOR PLAN 2
Monitor finger sticks on Lantus, sliding scale.  D/C planning on Lantus and Januvia, tomorrow.  Endo follow up.

## 2018-06-11 NOTE — PROGRESS NOTE ADULT - PROBLEM SELECTOR PLAN 1
Inpatient:  -test BG AC/HS  -c/w Lantus 8 units daily (at 3pm)-can move to AM upon discharge  -Start Humalog 2 units AC meals (hold if not eating)  -c/w humalog low correction scale AC and low HS scale  Discharge plan:  -c/w Lantus daily in AM + Prandin 0.5mg w/meals.   -Please send RX for Prandin, Glucometer, test strips and lancets.   -Pt to follow up w/PCP Dr Marquise Tse-daughter to schedule appointment prior to discharge-please document appointment on discharge document  -discussed w/pt, daughter and [facility] NP  pager: [phone]

## 2018-06-11 NOTE — PROGRESS NOTE ADULT - SUBJECTIVE AND OBJECTIVE BOX
Diabetes Follow up note:  Interval Hx:  68 year old male w/uncontrolled T2DM w/neuropathy and w/stage 4 neuroendocrine tumor with recent weight loss a/w mild euglycemic DKA on SGLT2 Inhibitor. Minimal insulin requirements while here in hospital but with BG of 260mg/dl at lunch today. Pt seen at bedside. Spoke to pt and daughter (via phone) regarding discharge planning. Daughter assists patient with meds and care-taking at home. Appetite improving today, plan for discharge home tomorrow.     Review of Systems:  General: Denies pain  GI: Tolerating POs without any N/V/D/ABD PAIN.  CV: No CP/SOB  ENDO: No S&Sx of hypoglycemia  MEDS:  insulin glargine Injectable (LANTUS) 8 Unit(s) SubCutaneous <User Schedule>  insulin lispro (HumaLOG) corrective regimen sliding scale   SubCutaneous three times a day before meals  insulin lispro (HumaLOG) corrective regimen sliding scale   SubCutaneous at bedtime      Allergies    No Known Allergies        PE:  General: Male lying in bed. NAD.   Vital Signs Last 24 Hrs  T(C): 36.8 (11 Jun 2018 13:55), Max: 37.1 (11 Jun 2018 04:54)  T(F): 98.3 (11 Jun 2018 13:55), Max: 98.7 (11 Jun 2018 04:54)  HR: 113 (11 Jun 2018 13:55) (109 - 113)  BP: 117/72 (11 Jun 2018 13:55) (117/72 - 128/74)  BP(mean): --  RR: 18 (11 Jun 2018 13:55) (18 - 18)  SpO2: 95% (11 Jun 2018 13:55) (95% - 97%)  Abd: Soft, NT,ND,   Extremities: Warm. No edema  Neuro: A&O X3    LABS:    POCT Blood Glucose.: 260 mg/dL (06-11-18 @ 12:22)  POCT Blood Glucose.: 134 mg/dL (06-11-18 @ 08:23)  POCT Blood Glucose.: 164 mg/dL (06-10-18 @ 21:26)  POCT Blood Glucose.: 208 mg/dL (06-10-18 @ 16:45)  POCT Blood Glucose.: 142 mg/dL (06-10-18 @ 12:09)  POCT Blood Glucose.: 93 mg/dL (06-10-18 @ 08:01)  POCT Blood Glucose.: 177 mg/dL (06-09-18 @ 21:54)  POCT Blood Glucose.: 142 mg/dL (06-09-18 @ 16:52)  POCT Blood Glucose.: 93 mg/dL (06-09-18 @ 12:22)  POCT Blood Glucose.: 112 mg/dL (06-09-18 @ 08:08)  POCT Blood Glucose.: 118 mg/dL (06-08-18 @ 21:17)  POCT Blood Glucose.: 149 mg/dL (06-08-18 @ 16:32)  POCT Blood Glucose.: 155 mg/dL (06-08-18 @ 15:16)                            9.7    7.63  )-----------( 250      ( 11 Jun 2018 08:12 )             29.9       06-11    134<L>  |  92<L>  |  9   ----------------------------<  138<H>  3.7   |  23  |  0.40<L>    Ca    8.6      11 Jun 2018 06:15    TPro  6.9  /  Alb  2.2<L>  /  TBili  0.6  /  DBili  x   /  AST  80<H>  /  ALT  23  /  AlkPhos  634<H>  06-11      Thyroid Function Tests:  06-09 @ 08:24 TSH 1.90 FreeT4 -- T3 -- Anti TPO -- Anti Thyroglobulin Ab -- TSI --      Hemoglobin A1C, Whole Blood: 8.2 % <H> [4.0 - 5.6] (06-07-18 @ 03:08)            Contact number: elsa [phone] or [phone]

## 2018-06-11 NOTE — DISCHARGE NOTE ADULT - MEDICATION SUMMARY - MEDICATIONS TO STOP TAKING
I will STOP taking the medications listed below when I get home from the hospital:    Invokamet  --  by mouth    morphine 15 mg oral tablet  -- 1 tab(s) by mouth every 4 hours, As Needed

## 2018-06-11 NOTE — PROGRESS NOTE ADULT - ASSESSMENT
67 yo [place]yr[place] speaking male PMHX HTN, type 2 diabetes mellitus (on Lantus), HLD, Anxiety on PRN Alprazolam,  and stage 4 Neuroendocrine CA (not on current chemo, finished radiation 2 weeks prior, currently awaiting trial therapy) was admitted to the hospital on 6/6/18 due to c/o weakness, presyncope, dizziness was found to have elevated anion gap to 31, decreased bicarb, glucosuria, mild acidosis with increased BHB of 6.6 likely consistent with mild DKA which has since resolved.

## 2018-06-11 NOTE — PROGRESS NOTE ADULT - SUBJECTIVE AND OBJECTIVE BOX
INTERNAL MEDICINE ACCEPTANCE NOTE  :     Patient is a 68y old  Male who presents with a chief complaint of DKA (06 Jun 2018 22:17)      Subjective Patient feels ok, no new complaints.   ROS other wise negative.     T(C): 36.8 (06-11-18 @ 13:55), Max: 37.1 (06-11-18 @ 04:54)  HR: 113 (06-11-18 @ 13:55) (109 - 113)  BP: 117/72 (06-11-18 @ 13:55) (117/72 - 128/74)  RR: 18 (06-11-18 @ 13:55) (18 - 18)  SpO2: 95% (06-11-18 @ 13:55) (95% - 97%)    MEDICATIONS  (STANDING):  dextrose 5%. 1000 milliLiter(s) (50 mL/Hr) IV Continuous <Continuous>  docusate sodium 100 milliGRAM(s) Oral three times a day  heparin  Injectable 5000 Unit(s) SubCutaneous every 8 hours  insulin glargine Injectable (LANTUS) 8 Unit(s) SubCutaneous <User Schedule>  insulin lispro (HumaLOG) corrective regimen sliding scale   SubCutaneous three times a day before meals  insulin lispro (HumaLOG) corrective regimen sliding scale   SubCutaneous at bedtime  polyethylene glycol 3350 17 Gram(s) Oral daily  senna 2 Tablet(s) Oral at bedtime    MEDICATIONS  (PRN):  ALPRAZolam 0.5 milliGRAM(s) Oral daily PRN anxiety  morphine  IR 15 milliGRAM(s) Oral every 4 hours PRN Severe Pain (7 - 10)    REVIEW OF SYSTEMS:  CONSTITUTIONAL: No fever  EYES: No visual disturbances  ENMT:  No throat pain  NECK: No pain or stiffness  RESPIRATORY: No cough, wheezing, chills or hemoptysis; No shortness of breath  CARDIOVASCULAR: No chest pain, palpitations, dizziness, or leg swelling  GASTROINTESTINAL: No abdominal or epigastric pain. No nausea, vomiting, or hematemesis; No diarrhea   NEUROLOGICAL: No headaches, pos general weakness   PSYCHIATRIC: h/o anxiety          PHYSICAL EXAM:  GENERAL: NAD, well-groomed, well-developed  HEAD:  Atraumatic, Normocephalic  NECK: Supple, No JVD, Normal thyroid  NERVOUS SYSTEM:  Alert & Oriented X 2 ( place and person)   CHEST/LUNG: Clear to percussion bilaterally; No rales, rhonchi, wheezing, or rubs  HEART: Regular rate and rhythm; No murmurs, rubs, or gallops  ABDOMEN: Soft, Nontender, Nondistended; Bowel sounds present  EXTREMITIES:  2+ Peripheral Pulses, No clubbing, cyanosis, or edema      Consultant(s) Notes Reviewed:  [x ] YES  [ ] NO                                                                    9.7    7.63  )-----------( 250      ( 11 Jun 2018 08:12 )             29.9           LIVER FUNCTIONS - ( 11 Jun 2018 06:15 )  Alb: 2.2 g/dL / Pro: 6.9 g/dL / ALK PHOS: 634 U/L / ALT: 23 U/L / AST: 80 U/L / GGT: x             134|92|9<138  3.7|23|0.40  8.6,--,--  06-11 @ 06:15    CAPILLARY BLOOD GLUCOSE      POCT Blood Glucose.: 260 mg/dL (11 Jun 2018 12:22)  POCT Blood Glucose.: 134 mg/dL (11 Jun 2018 08:23)  POCT Blood Glucose.: 164 mg/dL (10 Pierre 2018 21:26)  POCT Blood Glucose.: 208 mg/dL (10 Pierre 2018 16:45)        RADIOLOGY & ADDITIONAL TESTS:  EKG :     Imaging Personally Reviewed:  [ ] YES  [ ] NO  HEALTH ISSUES - PROBLEM Dx:  DKA (diabetic ketoacidoses): DKA (diabetic ketoacidoses)  Diabetes mellitus type 2, insulin dependent: Diabetes mellitus type 2, insulin dependent  Urinary tract infection without hematuria, site unspecified: Urinary tract infection without hematuria, site unspecified  Ketoacidosis: Ketoacidosis

## 2018-06-11 NOTE — DISCHARGE NOTE ADULT - CARE PLAN
Principal Discharge DX:	Ketoacidosis  Goal:	Diabetes  management  Assessment and plan of treatment:	You admitted to hospital with DKA in setting of missing Lantus dose  You were monitored and treated in ICU  Now Resolved  Continue current regimen for diabetes  Follow up with your Endocrinologist  Secondary Diagnosis:	Uncontrolled type 2 diabetes mellitus with diabetic polyneuropathy, with long-term current use of insulin  Assessment and plan of treatment:	HgA1C this admission 8.0  Make sure you get your HgA1c checked every three months.  If you take oral diabetes medications, check your blood glucose two times a day.  If you take insulin, check your blood glucose before meals and at bedtime.  It's important not to skip any meals.  Keep a log of your blood glucose results and always take it with you to your doctor appointments.  Keep a list of your current medications including injectables and over the counter medications and bring this medication list with you to all your doctor appointments.  If you have not seen your ophthalmologist this year call for appointment.  Check your feet daily for redness, sores, or openings. Do not self treat. If no improvement in two days call your primary care physician for an appointment.  Low blood sugar (hypoglycemia) is a blood sugar below 70mg/dl. Check your blood sugar if you feel signs/symptoms of hypoglycemia. If your blood sugar is below 70 take 15 grams of carbohydrates (ex 4 oz of apple juice, 3-4 glucose tablets, or 4-6 oz of regular soda) wait 15 minutes and repeat blood sugar to make sure it comes up above 70.  If your blood sugar is above 70 and you are due for a meal, have a meal.  If you are not due for a meal have a snack.  This snack helps keeps your blood sugar at a safe range.  Secondary Diagnosis:	Abnormal urinalysis  Assessment and plan of treatment:	You were treated with IV antibiotic  Secondary Diagnosis:	Decubitus ulcer of back, stage 2  Secondary Diagnosis:	Neuroendocrine tumor  Secondary Diagnosis:	Essential hypertension  Assessment and plan of treatment:	Continue current medications as directed   Low salt diet   Follow up with your primary care physician Principal Discharge DX:	Ketoacidosis  Goal:	Diabetes  management  Assessment and plan of treatment:	You admitted to hospital with DKA in setting of missing Lantus dose  You were monitored and treated in ICU  Now Resolved  Continue current regimen for diabetes  Follow up with your Endocrinologist  Secondary Diagnosis:	Uncontrolled type 2 diabetes mellitus with diabetic polyneuropathy, with long-term current use of insulin  Assessment and plan of treatment:	HgA1C this admission 8.0  Make sure you get your HgA1c checked every three months.  If you take oral diabetes medications, check your blood glucose two times a day.  If you take insulin, check your blood glucose before meals and at bedtime.  It's important not to skip any meals.  Keep a log of your blood glucose results and always take it with you to your doctor appointments.  Keep a list of your current medications including injectables and over the counter medications and bring this medication list with you to all your doctor appointments.  If you have not seen your ophthalmologist this year call for appointment.  Check your feet daily for redness, sores, or openings. Do not self treat. If no improvement in two days call your primary care physician for an appointment.  Low blood sugar (hypoglycemia) is a blood sugar below 70mg/dl. Check your blood sugar if you feel signs/symptoms of hypoglycemia. If your blood sugar is below 70 take 15 grams of carbohydrates (ex 4 oz of apple juice, 3-4 glucose tablets, or 4-6 oz of regular soda) wait 15 minutes and repeat blood sugar to make sure it comes up above 70.  If your blood sugar is above 70 and you are due for a meal, have a meal.  If you are not due for a meal have a snack.  This snack helps keeps your blood sugar at a safe range.  Secondary Diagnosis:	Abnormal urinalysis  Assessment and plan of treatment:	You were treated with IV antibiotic  Secondary Diagnosis:	Decubitus ulcer of back, stage 2  Secondary Diagnosis:	Neuroendocrine tumor  Assessment and plan of treatment:	You have an appointment with your oncologist on 6/15/18 for initiation of immunotherapy  Labs will be ordered by the Oncologist before initiation of therapy.  Follow up with your oncologist  Secondary Diagnosis:	Essential hypertension  Assessment and plan of treatment:	Continue current medications as directed   Low salt diet   Follow up with your primary care physician Principal Discharge DX:	Ketoacidosis  Goal:	Diabetes  management  Assessment and plan of treatment:	You admitted to hospital with DKA in setting of missing Lantus dose  You were monitored and treated in ICU  Now Resolved  Continue current regimen for diabetes  Follow up with your Endocrinologist or PCP  Secondary Diagnosis:	Uncontrolled type 2 diabetes mellitus with diabetic polyneuropathy, with long-term current use of insulin  Assessment and plan of treatment:	HgA1C this admission 8.0  Make sure you get your HgA1c checked every three months.  If you take oral diabetes medications, check your blood glucose two times a day.  If you take insulin, check your blood glucose before meals and at bedtime.  It's important not to skip any meals.  Keep a log of your blood glucose results and always take it with you to your doctor appointments.  Keep a list of your current medications including injectables and over the counter medications and bring this medication list with you to all your doctor appointments.  If you have not seen your ophthalmologist this year call for appointment.  Check your feet daily for redness, sores, or openings. Do not self treat. If no improvement in two days call your primary care physician for an appointment.  Low blood sugar (hypoglycemia) is a blood sugar below 70mg/dl. Check your blood sugar if you feel signs/symptoms of hypoglycemia. If your blood sugar is below 70 take 15 grams of carbohydrates (ex 4 oz of apple juice, 3-4 glucose tablets, or 4-6 oz of regular soda) wait 15 minutes and repeat blood sugar to make sure it comes up above 70.  If your blood sugar is above 70 and you are due for a meal, have a meal.  If you are not due for a meal have a snack.  This snack helps keeps your blood sugar at a safe range.  Secondary Diagnosis:	Abnormal urinalysis  Assessment and plan of treatment:	You were treated with IV antibiotic  Secondary Diagnosis:	Decubitus ulcer of sacral region, unstageable  Assessment and plan of treatment:	Apply Cavilon around wound and apply Allevyn  foam, change every 3-5 days  Wound care with Home care  Follow up with your primary care physcian  Secondary Diagnosis:	Neuroendocrine tumor  Assessment and plan of treatment:	You have an appointment with your oncologist on 6/15/18 for initiation of immunotherapy  Labs will be ordered by the Oncologist before initiation of therapy.  Follow up with your oncologist  Secondary Diagnosis:	Essential hypertension  Assessment and plan of treatment:	Continue current medications as directed   Low salt diet   Follow up with your primary care physician

## 2018-06-11 NOTE — DISCHARGE NOTE ADULT - MEDICATION SUMMARY - MEDICATIONS TO CHANGE
I will SWITCH the dose or number of times a day I take the medications listed below when I get home from the hospital:    Lantus  -- 10 unit(s) subcutaneous once a day (at bedtime)    ALPRAZolam 0.5 mg oral tablet  -- orally 4 times a day

## 2018-06-11 NOTE — DISCHARGE NOTE ADULT - PLAN OF CARE
Diabetes  management You admitted to hospital with DKA in setting of missing Lantus dose  You were monitored and treated in ICU  Now Resolved  Continue current regimen for diabetes  Follow up with your Endocrinologist HgA1C this admission 8.0  Make sure you get your HgA1c checked every three months.  If you take oral diabetes medications, check your blood glucose two times a day.  If you take insulin, check your blood glucose before meals and at bedtime.  It's important not to skip any meals.  Keep a log of your blood glucose results and always take it with you to your doctor appointments.  Keep a list of your current medications including injectables and over the counter medications and bring this medication list with you to all your doctor appointments.  If you have not seen your ophthalmologist this year call for appointment.  Check your feet daily for redness, sores, or openings. Do not self treat. If no improvement in two days call your primary care physician for an appointment.  Low blood sugar (hypoglycemia) is a blood sugar below 70mg/dl. Check your blood sugar if you feel signs/symptoms of hypoglycemia. If your blood sugar is below 70 take 15 grams of carbohydrates (ex 4 oz of apple juice, 3-4 glucose tablets, or 4-6 oz of regular soda) wait 15 minutes and repeat blood sugar to make sure it comes up above 70.  If your blood sugar is above 70 and you are due for a meal, have a meal.  If you are not due for a meal have a snack.  This snack helps keeps your blood sugar at a safe range. You were treated with IV antibiotic Continue current medications as directed   Low salt diet   Follow up with your primary care physician You have an appointment with your oncologist on 6/15/18 for initiation of immunotherapy  Labs will be ordered by the Oncologist before initiation of therapy.  Follow up with your oncologist You admitted to hospital with DKA in setting of missing Lantus dose  You were monitored and treated in ICU  Now Resolved  Continue current regimen for diabetes  Follow up with your Endocrinologist or PCP Apply Cavilon around wound and apply Allevyn  foam, change every 3-5 days  Wound care with Home care  Follow up with your primary care physcian

## 2018-06-11 NOTE — DISCHARGE NOTE ADULT - HOSPITAL COURSE
67 yo [place] speaking male PMHX HTN, type 2 diabetes mellitus (on Lantus), HLD, Anxiety on PRN Alprazolam,  and stage 4 Neuroendocrine CA (not on current chemo, finished radiation 2 weeks prior, currently awaiting trial therapy) was admitted to the hospital on 6/6/18 due to c/o weakness, presyncope, dizziness was found to have elevated anion gap to 31, decreased bicarb, glucosuria, mild acidosis with increased BHB of 6.6 likely consistent with mild DKA which has since resolved.       Problem/Plan - 1:  ·  Problem: Ketoacidosis.  Plan: s/p ketoacidosis in the setting of SGL2 inhibitor use.  Now resolved with closed AG, endo following, monitor   Patient is currently on  Lantus 8 units w/ ISS,      Problem/Plan - 2:  ·  Problem: Diabetes mellitus type 2, insulin dependent.  Plan: Monitor finger sticks on Lantus, sliding scale.  D/C planning on Lantus and Januvia, tomorrow.      Problem/Plan - 3:  ·  Problem: Abnormal urinalysis.  Plan: Patient completed Ceftriaxone course.      Problem/Plan - 4:  ·  Problem: Decubitus ulcer of back, stage 2. Plan: Wound care consult.     Problem/Plan - 5:  ·  Problem: Hyperlipidemia, unspecified hyperlipidemia type. Plan: , cholestrol 76.   Diet and exercise.     Problem/Plan - 6:  Problem: Essential hypertension.Plan: BP is currently stable.     Problem/Plan - 7:  ·  Problem: Normocytic anemia. Plan: Anemia of chronic disease in the setting of Neuroendocrine tumour.     Problem/Plan - 8:  ·  Problem: Neuroendocrine tumor. Plan: Patient already has an apt with the Oncologist on 6/15/18 for initiation of immunotherapy as discussed with the Oncologist   Labs will be ordered by the Oncologist before initiation of therapy.  Oncologist will schedule post hospital f/up once patient is d/c home.     Problem/Plan - 9:  ·  Problem: Anxiety.  Plan: cont Alprazolam PRN. 69 yo [place]an speaking male PMHX HTN, type 2 diabetes mellitus (on Lantus), HLD, Anxiety on PRN Alprazolam,  and stage 4 Neuroendocrine CA (not on current chemo, finished radiation 2 weeks prior, currently awaiting trial therapy) was admitted to the hospital on 6/6/18 due to c/o weakness, presyncope, dizziness was found to have elevated anion gap to 31, decreased bicarb, glucosuria, mild acidosis with increased BHB of 6.6 likely consistent with mild DKA which has since resolved. DKA in the setting of SGL2 inhibitor use, now on Lantus 8 units daily. To be discharged on Lantus 8 units and Prandin 0.5 mg premeal as recommended by Endo.   Abnormal UA treated with Ceftriaxone. Evaluated by wound care team for unstageable sacral wound, Allevyn foam recommended.  pt to follow up with PCP and Oncology. Wound care with home care   DCP and Med rec discussed with Dr. Sevilla 69 yo [place] speaking male PMHX HTN, type 2 diabetes mellitus (on Lantus), HLD, Anxiety on PRN Alprazolam,  and stage 4 Neuroendocrine CA (not on current chemo, finished radiation 2 weeks prior, currently awaiting trial therapy) was admitted to the hospital on 6/6/18 due to c/o weakness, presyncope, dizziness was found to have elevated anion gap to 31, decreased bicarb, glucosuria, mild acidosis with increased BHB of 6.6 likely consistent with mild DKA which has since resolved. DKA in the setting of SGL2 inhibitor use, now on Lantus 8 units daily. To be discharged on Lantus 8 units and Prandin 0.5 mg premeal as recommended by Endo.   Abnormal UA treated with Ceftriaxone. Evaluated by wound care team for unstageable sacral wound, Allevyn foam recommended.  pt to follow up with PCP and Oncology. Wound care with home care   DCP and Med rec discussed with Dr. Sevilla    Post Discharge Addendum  Patient has severe Malnutrition, Nutrition consulted.

## 2018-06-11 NOTE — DISCHARGE NOTE ADULT - HOME CARE AGENCY
[facility] , [phone] RN for wound care ;  RN to call and make initial appointment

## 2018-06-11 NOTE — DISCHARGE NOTE ADULT - ADDITIONAL INSTRUCTIONS
Please follow up with Dr. Marquise Tse in 5-7 days  Diabetes management as directed  Continue wound care with home care   Follow up with your oncologist as scheduled  Make appointments to follow up with your out patient physicians.  Bring all discharge paperwork including discharge medication list to your follow up appointments.

## 2018-06-11 NOTE — DISCHARGE NOTE ADULT - PROVIDER TOKENS
FREE:[LAST:[Marqusie],FIRST:[Dedrick],PHONE:[(   )    -],FAX:[(   )    -],ADDRESS:[Primary care physician]]

## 2018-06-11 NOTE — PROGRESS NOTE ADULT - ASSESSMENT
67 y/o M with uncontrolled DM2, HTN, HLD, presents with euglycemic DKA in setting of Invokana use. Discussed w/pt and daughter, recommend discontinuing invokana at home. Recommend prandin w/meals in addition to taking Lantus daily in AM. Daughter was not testing glucose values, discussed obtaining new meter and begin testing to assess for hyper/hypoglycemia to aid in management. Recommended following up with endocrinologist but daughter would like to receive referral from PCP to someone close to their home. BG goal (100-180mg/dl).

## 2018-06-11 NOTE — CHART NOTE - NSCHARTNOTEFT_GEN_A_CORE
Source: Patient [ ]    Family [ ]     other [ x]RN.  Patient seen for malnutrition follow up.  Patient noted to be confused per EMR.  Patient found sleeping soundly in bed, snoring.  As per RN, patient is eating a little bit better.  Accepting about 50% of food brought in from home.  does not care for much of hospital food but likes pasta dishes.  Suggest addition of Glucerna Shakes 8 ounces x2. Fingerstick improving on lantus and insulin sliding scale.  Weight is questionable.    Patient followed by endocrine and oncology.  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    Diet : Consistent CHO, snack, regular      Patient reports [ ] nausea  [ ] vomiting [ ] diarrhea [ ] constipation  [ ]chewing problems [ ] swallowing issues  [ ] other: noted with early satiety     PO intake:  < 50% [ x] 50-75% [ ]   % [ ]  other :     Source for PO intake [ ] Patient [ ] family [ ] chart [ x staff [ ] other RN           Current Weight: Weight (kg): 53 (06-08 @ 12:59), 46.1 (06-06 @ 22:30), Weight 06/11- 105.8#  Down from 112 pounds on 06/08?  % Weight Change    Pertinent Medications: MEDICATIONS  (STANDING):  dextrose 5%. 1000 milliLiter(s) (50 mL/Hr) IV Continuous <Continuous>  docusate sodium 100 milliGRAM(s) Oral three times a day  heparin  Injectable 5000 Unit(s) SubCutaneous every 8 hours  insulin glargine Injectable (LANTUS) 8 Unit(s) SubCutaneous <User Schedule>  insulin lispro (HumaLOG) corrective regimen sliding scale   SubCutaneous three times a day before meals  insulin lispro (HumaLOG) corrective regimen sliding scale   SubCutaneous at bedtime  polyethylene glycol 3350 17 Gram(s) Oral daily  senna 2 Tablet(s) Oral at bedtime    MEDICATIONS  (PRN):  ALPRAZolam 0.5 milliGRAM(s) Oral daily PRN anxiety  morphine  IR 15 milliGRAM(s) Oral every 4 hours PRN Severe Pain (7 - 10)    Pertinent Labs:  06-11 Na134 mmol/L<L> Glu 138 mg/dL<H> K+ 3.7 mmol/L Cr  0.40 mg/dL<L> BUN 9 mg/dL 06-08 Phos 2.5 mg/dL 06-11 Alb 2.2 g/dL<L> 06-07 [id number] 8.2 %<H> 06-09 Chol 94 mg/dL LDL 46 mg/dL HDL 17 mg/dL<L> Trig 156 mg/dL<H>      Skin: intact    Estimated Needs:   [ x] no change since previous assessment  [ ] recalculated:       Previous Nutrition Diagnosis:     [ ] Inadequate Energy Intake [ ]Inadequate Oral Intake [ ] Excessive Energy Intake     [ ] Underweight [ ] Increased Nutrient Needs [ ] Overweight/Obesity     [ ] Altered GI Function [ ] Unintended Weight Loss [ ] Food & Nutrition Related Knowledge Deficit [x ] Malnutrition - severe         Nutrition Diagnosis is [x ] ongoing  [ ] resolved [ ] not applicable          New Nutrition Diagnosis: [ x] not applicable    [ ] Inadequate Protein Energy Intake [ ]Inadequate Oral Intake [ ] Excessive Energy Intake     [ ] Underweight [ ] Increased Nutrient Needs [ ] Overweight/Obesity     [ ] Altered GI Function [ ] Unintended Weight Loss [ ] Food & Nutrition Related Knowledge Deficit[ ] Limited Adherence to nutrition related recommendations [ ] Malnutrition  [ ] other: Free text       Related to:      As evidenced by:      Interventions:     Recommend    [ ] Change Diet To:    [ x] Nutrition Supplement- suggest glucerna shakes 8 ounces x2    [ ] Nutrition Support    [ ] Other:        Monitoring and Evaluation:     [x ] PO intake [ x] Tolerance to diet prescription [x ] weights [x ] follow up per protocol    [ x] other: Please recheck weights for accuracy

## 2018-06-11 NOTE — DISCHARGE NOTE ADULT - MEDICATION SUMMARY - MEDICATIONS TO TAKE
I will START or STAY ON the medications listed below when I get home from the hospital:    Glucometer  -- Indication: For Diabetes mellitus type 2, insulin dependent    Finger stick strips  -- 4 times a day   One month supply  -- Indication: For Diabetes mellitus type 2, insulin dependent    Lancets  -- 4 times a day   -- Indication: For Diabetes mellitus type 2, insulin dependent    Alcohol pad  -- 4 times a day   X 1Box  -- Indication: For Diabetes mellitus type 2, insulin dependent    morphine 15 mg/12 hr oral tablet, extended release  -- 1 tab(s) by mouth every 12 hours MDD:2 tabs  -- Caution federal law prohibits the transfer of this drug to any person other  than the person for whom it was prescribed.  It is very important that you take or use this exactly as directed.  Do not skip doses or discontinue unless directed by your doctor.  May cause drowsiness.  Alcohol may intensify this effect.  Use care when operating dangerous machinery.  Swallow whole.  Do not crush.  This prescription cannot be refilled.  Using more of this medication than prescribed may cause serious breathing problems.    -- Indication: For Pain management     oxyCODONE 5 mg oral tablet  -- 1 tab(s) by mouth every 4 hours, As Needed MDD:6 tabs  -- Caution federal law prohibits the transfer of this drug to any person other  than the person for whom it was prescribed.  It is very important that you take or use this exactly as directed.  Do not skip doses or discontinue unless directed by your doctor.  May cause drowsiness.  Alcohol may intensify this effect.  Use care when operating dangerous machinery.  This prescription cannot be refilled.  Using more of this medication than prescribed may cause serious breathing problems.    -- Indication: For Pain managment     insulin glargine  -- 8 unit(s) subcutaneous once a day at 10 am   -- Indication: For Diabetes mellitus type 2, insulin dependent    Prandin 0.5 mg oral tablet  -- 1 tab(s) by mouth 3 times a day (before meals) Hold if you are not eating   -- Do not drink alcoholic beverages when taking this medication.  It is very important that you take or use this exactly as directed.  Do not skip doses or discontinue unless directed by your doctor.  Obtain medical advice before taking any non-prescription drugs as some may affect the action of this medication.    -- Indication: For Diabetes mellitus type 2, insulin dependent    Zofran 8 mg oral tablet  -- 1 tab(s) by mouth 3 times a day, As Needed  -- Indication: For Nausea    Reglan 10 mg oral tablet  -- 1 tab(s) by mouth 4 times a day (before meals and at bedtime), As Needed  -- Indication: For Nausea    ALPRAZolam 0.5 mg oral tablet  -- 1 tab(s) by mouth every 12 hours x 5 days, As Needed MDD:2 tabs  -- Indication: For Anxiety    MiraLax oral powder for reconstitution  -- orally once a day, As Needed - for constipation  -- Indication: For Bowel movment     docusate sodium 100 mg oral capsule  -- 1 cap(s) by mouth 2 times a day  -- Indication: For Bowel movement     Vitamin C 500 mg oral tablet  -- 1 tab(s) by mouth once a day   -- Indication: For Supplement

## 2018-06-11 NOTE — DISCHARGE NOTE ADULT - SECONDARY DIAGNOSIS.
Uncontrolled type 2 diabetes mellitus with diabetic polyneuropathy, with long-term current use of insulin Abnormal urinalysis Decubitus ulcer of back, stage 2 Neuroendocrine tumor Essential hypertension Decubitus ulcer of sacral region, unstageable

## 2018-06-11 NOTE — DISCHARGE NOTE ADULT - PATIENT PORTAL LINK FT
You can access the [facility] Portal, offered by [facility], by registering with the following website: [email or web address]

## 2018-06-12 ENCOUNTER — APPOINTMENT (OUTPATIENT)
Dept: HEMATOLOGY ONCOLOGY | Facility: CLINIC | Age: 68
End: 2018-06-12

## 2018-06-12 VITALS
OXYGEN SATURATION: 94 % | TEMPERATURE: 98 F | SYSTOLIC BLOOD PRESSURE: 122 MMHG | DIASTOLIC BLOOD PRESSURE: 76 MMHG | RESPIRATION RATE: 18 BRPM | HEART RATE: 113 BPM

## 2018-06-12 LAB
ANION GAP SERPL CALC-SCNC: 11 MMOL/L — SIGNIFICANT CHANGE UP (ref 5–17)
BUN SERPL-MCNC: 9 MG/DL — SIGNIFICANT CHANGE UP (ref 7–23)
CALCIUM SERPL-MCNC: 8.7 MG/DL — SIGNIFICANT CHANGE UP (ref 8.4–10.5)
CHLORIDE SERPL-SCNC: 95 MMOL/L — LOW (ref 96–108)
CO2 SERPL-SCNC: 28 MMOL/L — SIGNIFICANT CHANGE UP (ref 22–31)
CREAT SERPL-MCNC: 0.4 MG/DL — LOW (ref 0.5–1.3)
GLUCOSE BLDC GLUCOMTR-MCNC: 169 MG/DL — HIGH (ref 70–99)
GLUCOSE BLDC GLUCOMTR-MCNC: 236 MG/DL — HIGH (ref 70–99)
GLUCOSE SERPL-MCNC: 134 MG/DL — HIGH (ref 70–99)
HCT VFR BLD CALC: 35.1 % — LOW (ref 39–50)
HGB BLD-MCNC: 11.1 G/DL — LOW (ref 13–17)
MCHC RBC-ENTMCNC: 27.1 PG — SIGNIFICANT CHANGE UP (ref 27–34)
MCHC RBC-ENTMCNC: 31.6 GM/DL — LOW (ref 32–36)
MCV RBC AUTO: 85.9 FL — SIGNIFICANT CHANGE UP (ref 80–100)
PLATELET # BLD AUTO: 262 K/UL — SIGNIFICANT CHANGE UP (ref 150–400)
POTASSIUM SERPL-MCNC: 3.9 MMOL/L — SIGNIFICANT CHANGE UP (ref 3.5–5.3)
POTASSIUM SERPL-SCNC: 3.9 MMOL/L — SIGNIFICANT CHANGE UP (ref 3.5–5.3)
RBC # BLD: 4.09 M/UL — LOW (ref 4.2–5.8)
RBC # FLD: 16.5 % — HIGH (ref 10.3–14.5)
SODIUM SERPL-SCNC: 134 MMOL/L — LOW (ref 135–145)
WBC # BLD: 8.8 K/UL — SIGNIFICANT CHANGE UP (ref 3.8–10.5)
WBC # FLD AUTO: 8.8 K/UL — SIGNIFICANT CHANGE UP (ref 3.8–10.5)

## 2018-06-12 PROCEDURE — 71275 CT ANGIOGRAPHY CHEST: CPT

## 2018-06-12 PROCEDURE — 97162 PT EVAL MOD COMPLEX 30 MIN: CPT

## 2018-06-12 PROCEDURE — 99231 SBSQ HOSP IP/OBS SF/LOW 25: CPT

## 2018-06-12 PROCEDURE — 82330 ASSAY OF CALCIUM: CPT

## 2018-06-12 PROCEDURE — 96374 THER/PROPH/DIAG INJ IV PUSH: CPT | Mod: XU

## 2018-06-12 PROCEDURE — 82435 ASSAY OF BLOOD CHLORIDE: CPT

## 2018-06-12 PROCEDURE — 83605 ASSAY OF LACTIC ACID: CPT

## 2018-06-12 PROCEDURE — 85730 THROMBOPLASTIN TIME PARTIAL: CPT

## 2018-06-12 PROCEDURE — 84295 ASSAY OF SERUM SODIUM: CPT

## 2018-06-12 PROCEDURE — 85610 PROTHROMBIN TIME: CPT

## 2018-06-12 PROCEDURE — 82607 VITAMIN B-12: CPT

## 2018-06-12 PROCEDURE — 87086 URINE CULTURE/COLONY COUNT: CPT

## 2018-06-12 PROCEDURE — 87040 BLOOD CULTURE FOR BACTERIA: CPT

## 2018-06-12 PROCEDURE — 84443 ASSAY THYROID STIM HORMONE: CPT

## 2018-06-12 PROCEDURE — 82962 GLUCOSE BLOOD TEST: CPT

## 2018-06-12 PROCEDURE — 99222 1ST HOSP IP/OBS MODERATE 55: CPT

## 2018-06-12 PROCEDURE — 82024 ASSAY OF ACTH: CPT

## 2018-06-12 PROCEDURE — 96372 THER/PROPH/DIAG INJ SC/IM: CPT | Mod: XU

## 2018-06-12 PROCEDURE — 99285 EMERGENCY DEPT VISIT HI MDM: CPT | Mod: 25

## 2018-06-12 PROCEDURE — 85027 COMPLETE CBC AUTOMATED: CPT

## 2018-06-12 PROCEDURE — 82947 ASSAY GLUCOSE BLOOD QUANT: CPT

## 2018-06-12 PROCEDURE — 80053 COMPREHEN METABOLIC PANEL: CPT

## 2018-06-12 PROCEDURE — 99239 HOSP IP/OBS DSCHRG MGMT >30: CPT

## 2018-06-12 PROCEDURE — 81001 URINALYSIS AUTO W/SCOPE: CPT

## 2018-06-12 PROCEDURE — 80048 BASIC METABOLIC PNL TOTAL CA: CPT

## 2018-06-12 PROCEDURE — 70450 CT HEAD/BRAIN W/O DYE: CPT

## 2018-06-12 PROCEDURE — 71045 X-RAY EXAM CHEST 1 VIEW: CPT

## 2018-06-12 PROCEDURE — 94640 AIRWAY INHALATION TREATMENT: CPT

## 2018-06-12 PROCEDURE — 84132 ASSAY OF SERUM POTASSIUM: CPT

## 2018-06-12 PROCEDURE — 83735 ASSAY OF MAGNESIUM: CPT

## 2018-06-12 PROCEDURE — 84484 ASSAY OF TROPONIN QUANT: CPT

## 2018-06-12 PROCEDURE — 85014 HEMATOCRIT: CPT

## 2018-06-12 PROCEDURE — 82728 ASSAY OF FERRITIN: CPT

## 2018-06-12 PROCEDURE — 93005 ELECTROCARDIOGRAM TRACING: CPT

## 2018-06-12 PROCEDURE — 83036 HEMOGLOBIN GLYCOSYLATED A1C: CPT

## 2018-06-12 PROCEDURE — 83550 IRON BINDING TEST: CPT

## 2018-06-12 PROCEDURE — 84100 ASSAY OF PHOSPHORUS: CPT

## 2018-06-12 PROCEDURE — 80061 LIPID PANEL: CPT

## 2018-06-12 PROCEDURE — 82803 BLOOD GASES ANY COMBINATION: CPT

## 2018-06-12 PROCEDURE — 82010 KETONE BODYS QUAN: CPT

## 2018-06-12 RX ORDER — ASCORBIC ACID 60 MG
1 TABLET,CHEWABLE ORAL
Qty: 30 | Refills: 0 | OUTPATIENT
Start: 2018-06-12 | End: 2018-07-11

## 2018-06-12 RX ORDER — OXYCODONE HYDROCHLORIDE 5 MG/1
1 TABLET ORAL
Qty: 30 | Refills: 0 | OUTPATIENT
Start: 2018-06-12 | End: 2018-06-16

## 2018-06-12 RX ORDER — REPAGLINIDE 1 MG/1
1 TABLET ORAL
Qty: 90 | Refills: 0 | OUTPATIENT
Start: 2018-06-12 | End: 2018-07-11

## 2018-06-12 RX ORDER — INSULIN GLARGINE 100 [IU]/ML
5 INJECTION, SOLUTION SUBCUTANEOUS
Qty: 0 | Refills: 0 | COMMUNITY
Start: 2018-06-12

## 2018-06-12 RX ORDER — INSULIN GLARGINE 100 [IU]/ML
8 INJECTION, SOLUTION SUBCUTANEOUS
Qty: 0 | Refills: 0 | COMMUNITY
Start: 2018-06-12

## 2018-06-12 RX ORDER — ALPRAZOLAM 0.25 MG
1 TABLET ORAL
Qty: 10 | Refills: 0 | OUTPATIENT
Start: 2018-06-12 | End: 2018-06-16

## 2018-06-12 RX ORDER — MORPHINE SULFATE 50 MG/1
1 CAPSULE, EXTENDED RELEASE ORAL
Qty: 10 | Refills: 0 | OUTPATIENT
Start: 2018-06-12 | End: 2018-06-16

## 2018-06-12 RX ADMIN — HEPARIN SODIUM 5000 UNIT(S): 5000 INJECTION INTRAVENOUS; SUBCUTANEOUS at 06:31

## 2018-06-12 RX ADMIN — Medication 2 UNIT(S): at 08:08

## 2018-06-12 RX ADMIN — MORPHINE SULFATE 15 MILLIGRAM(S): 50 CAPSULE, EXTENDED RELEASE ORAL at 09:18

## 2018-06-12 RX ADMIN — Medication 2: at 12:24

## 2018-06-12 RX ADMIN — MORPHINE SULFATE 15 MILLIGRAM(S): 50 CAPSULE, EXTENDED RELEASE ORAL at 10:00

## 2018-06-12 RX ADMIN — Medication 1: at 08:08

## 2018-06-12 RX ADMIN — Medication 100 MILLIGRAM(S): at 06:32

## 2018-06-12 RX ADMIN — Medication 2 UNIT(S): at 12:23

## 2018-06-12 NOTE — PROGRESS NOTE ADULT - SUBJECTIVE AND OBJECTIVE BOX
INTERNAL MEDICINE ACCEPTANCE NOTE  :     Patient is a 68y old  Male who presents with a chief complaint of DKA (06 Jun 2018 22:17)      Subjective Patient feels ok, no new complaints.   ROS other wise negative.     T(C): 36.8 (06-12-18 @ 09:00), Max: 36.8 (06-12-18 @ 09:00)  HR: 113 (06-12-18 @ 09:00) (113 - 114)  BP: 122/76 (06-12-18 @ 09:00) (121/76 - 122/76)  RR: 18 (06-12-18 @ 09:00) (18 - 18)  SpO2: 94% (06-12-18 @ 09:00) (94% - 95%)    MEDICATIONS  (STANDING):  dextrose 5%. 1000 milliLiter(s) (50 mL/Hr) IV Continuous <Continuous>  dextrose 5%. 1000 milliLiter(s) (50 mL/Hr) IV Continuous <Continuous>  dextrose 50% Injectable 12.5 Gram(s) IV Push once  dextrose 50% Injectable 25 Gram(s) IV Push once  dextrose 50% Injectable 25 Gram(s) IV Push once  docusate sodium 100 milliGRAM(s) Oral three times a day  heparin  Injectable 5000 Unit(s) SubCutaneous every 8 hours  insulin glargine Injectable (LANTUS) 8 Unit(s) SubCutaneous <User Schedule>  insulin lispro (HumaLOG) corrective regimen sliding scale   SubCutaneous three times a day before meals  insulin lispro (HumaLOG) corrective regimen sliding scale   SubCutaneous at bedtime  insulin lispro Injectable (HumaLOG) 2 Unit(s) SubCutaneous three times a day with meals  polyethylene glycol 3350 17 Gram(s) Oral daily  senna 2 Tablet(s) Oral at bedtime    MEDICATIONS  (PRN):  ALPRAZolam 0.5 milliGRAM(s) Oral daily PRN anxiety  dextrose 40% Gel 15 Gram(s) Oral once PRN Blood Glucose LESS THAN 70 milliGRAM(s)/deciLiter  glucagon  Injectable 1 milliGRAM(s) IntraMuscular once PRN Glucose <70 milliGRAM(s)/deciLiter  morphine  IR 15 milliGRAM(s) Oral every 4 hours PRN Severe Pain (7 - 10)      REVIEW OF SYSTEMS:  CONSTITUTIONAL: No fever  EYES: No visual disturbances  ENMT:  No throat pain  NECK: No pain or stiffness  RESPIRATORY: No cough, wheezing, chills or hemoptysis; No shortness of breath  CARDIOVASCULAR: No chest pain, palpitations, dizziness, or leg swelling  GASTROINTESTINAL: No abdominal or epigastric pain. No nausea, vomiting, or hematemesis; No diarrhea   NEUROLOGICAL: No headaches, pos general weakness   PSYCHIATRIC: h/o anxiety                            11.1   8.8   )-----------( 262      ( 12 Jun 2018 06:26 )             35.1           LIVER FUNCTIONS - ( 11 Jun 2018 06:15 )  Alb: 2.2 g/dL / Pro: 6.9 g/dL / ALK PHOS: 634 U/L / ALT: 23 U/L / AST: 80 U/L / GGT: x             134|95|9<134  3.9|28|0.40  8.7,--,--  06-12 @ 06:25  Consultant(s) Notes Reviewed:  [x ] YES  [ ] NO                                                                    9.7    7.63  )-----------( 250      ( 11 Jun 2018 08:12 )             29.9           LIVER FUNCTIONS - ( 11 Jun 2018 06:15 )  Alb: 2.2 g/dL / Pro: 6.9 g/dL / ALK PHOS: 634 U/L / ALT: 23 U/L / AST: 80 U/L / GGT: x             134|92|9<138  3.7|23|0.40  8.6,--,--  06-11 @ 06:15    CAPILLARY BLOOD GLUCOSE      CAPILLARY BLOOD GLUCOSE      POCT Blood Glucose.: 169 mg/dL (12 Jun 2018 08:03)  POCT Blood Glucose.: 145 mg/dL (11 Jun 2018 21:46)  POCT Blood Glucose.: 235 mg/dL (11 Jun 2018 16:46)  POCT Blood Glucose.: 260 mg/dL (11 Jun 2018 12:22)        RADIOLOGY & ADDITIONAL TESTS:  EKG :     Imaging Personally Reviewed:  [ ] YES  [ ] NO  HEALTH ISSUES - PROBLEM Dx:  DKA (diabetic ketoacidoses): DKA (diabetic ketoacidoses)  Diabetes mellitus type 2, insulin dependent: Diabetes mellitus type 2, insulin dependent  Urinary tract infection without hematuria, site unspecified: Urinary tract infection without hematuria, site unspecified  Ketoacidosis: Ketoacidosis

## 2018-06-12 NOTE — PROGRESS NOTE ADULT - PROBLEM SELECTOR PROBLEM 1
Ketoacidosis
Uncontrolled type 2 diabetes mellitus with diabetic polyneuropathy, with long-term current use of insulin
Uncontrolled type 2 diabetes mellitus with diabetic polyneuropathy, with long-term current use of insulin
Ketoacidosis

## 2018-06-12 NOTE — ADVANCED PRACTICE NURSE CONSULT - REASON FOR CONSULT
Requested by staff to assess skin status: sacrum. PMH is noted:  67 yo [place] speaking male PMHX HTN, type 2 diabetes mellitus (on Lantus and Invokana), HLD, and stage 4 neuroendocrine CA (not on current chemo, finished radiation 2 weeks prior, currently awaiting trial therapy) presents to ED BIBEMS c/o weakness. Daughter at bedside provides most of history per patient's request. Patient today had a pre-syncopal episode after eating, however never lost consciousness or had trauma. Patient was able to get up and ambulate afterwards. Patient has baseline dizziness. Patient takes 20U Lantus at bedtime but has missed doses in the past couple of days since he assumed he should not be administering it if he is not eating that much. Patient last took Invokana yesterday morning. Since patient's last radiation therapy in May, patient has had decreased appetite, and has only eaten one meal per day. Patient feels hungry but has early satiety per daughter. Patient has also had increased frequency of urinary at home, but without dysuria. Of note patient had CT scan on 5/30 showing metastasis to liver and new nodule in lung suggestive of metastasis. Daughter called Dr. Yari Dick pt's oncologist at [facility] and she advised them to come to ED for eval.   Patient currently denies any complaints but he is alert and oriented to person and birthdate and not to location. Per daughter he is feeling very tired, and is a little altered from baseline. He has known tachycardia 2/2 anxiety as he does not like the hospital setting. Otherwise, no history of heart failure, fevers, chills, nausea, vomiting or diarrhea.     Patient hemodynamically stable. In the ER, received Ceftriaxone, Humalog 6U, Ativan, 2L fluids

## 2018-06-12 NOTE — ADVANCED PRACTICE NURSE CONSULT - ASSESSMENT
The pt is on a [employer] P 500 support surface for low air-loss and pressure redistribution features. As per review of the nursing documentation, , the pt is being turned and positioned. When encouraged to do so, the pt was able to turn himself to the side. the pts sister and daughter were in the room. Education was provided as to the principles of pressure ulcer prevention as well as the principles of wound healing. It was explained that mobility, nutrition and moisture management play a role in pressure ulcer prevention and  wound healing.  As per the pts daughter, the pt "got the wound because he was in bed at home and not moving" Upon assessment, the pt presents with a small wound directly over the coccyx - it measures 0.9cm x 0.5cm x0cm. the wound has a yellow base. the periwound skin is darkened. there is no induration or fluctuance. Pt is thin, frail with protuberant bony prominences. Will recommend a foam dressing to cushion and promote moist healing. While the wound is located directly over the bony prominence, given the size and presentation suggest that perhaps friction and/or shear  may have had an impact.  The daughter, Yari, was taught how to care for the wound and apply the foam dressing. the purpose of the seat cushion was explained and pt had one at the bedside to take home.  the pt was seen by nutrition during his hospital stay with a dx of severe chronic malnutrition noted.

## 2018-06-12 NOTE — PROGRESS NOTE ADULT - SUBJECTIVE AND OBJECTIVE BOX
Chief Complaint: type 2 diabetes     Interval history: Plan formulated for discharge yesterday and discussed by NP with the daughter of patient.  Went by today again to check on pt who is still at this time planned for d/c, and daughter was at bedside.  No new issues identified by her, and no new questions with regard to DM mgmt outpatient. Sugars with improved control later in day yesterday after mealtime boluses were given.     MEDICATIONS  (STANDING):  dextrose 5%. 1000 milliLiter(s) (50 mL/Hr) IV Continuous <Continuous>  dextrose 5%. 1000 milliLiter(s) (50 mL/Hr) IV Continuous <Continuous>  dextrose 50% Injectable 12.5 Gram(s) IV Push once  dextrose 50% Injectable 25 Gram(s) IV Push once  dextrose 50% Injectable 25 Gram(s) IV Push once  docusate sodium 100 milliGRAM(s) Oral three times a day  heparin  Injectable 5000 Unit(s) SubCutaneous every 8 hours  insulin glargine Injectable (LANTUS) 8 Unit(s) SubCutaneous <User Schedule>  insulin lispro (HumaLOG) corrective regimen sliding scale   SubCutaneous three times a day before meals  insulin lispro (HumaLOG) corrective regimen sliding scale   SubCutaneous at bedtime  insulin lispro Injectable (HumaLOG) 2 Unit(s) SubCutaneous three times a day with meals  polyethylene glycol 3350 17 Gram(s) Oral daily  senna 2 Tablet(s) Oral at bedtime    MEDICATIONS  (PRN):  ALPRAZolam 0.5 milliGRAM(s) Oral daily PRN anxiety  dextrose 40% Gel 15 Gram(s) Oral once PRN Blood Glucose LESS THAN 70 milliGRAM(s)/deciLiter  glucagon  Injectable 1 milliGRAM(s) IntraMuscular once PRN Glucose <70 milliGRAM(s)/deciLiter  morphine  IR 15 milliGRAM(s) Oral every 4 hours PRN Severe Pain (7 - 10)      Allergies    No Known Allergies    Intolerances      Review of Systems:  Skin: no rash  Endocrine: no polyuria, polydipsia    ALL OTHER SYSTEMS REVIEWED AND NEGATIVE    PHYSICAL EXAM:  VITALS: T(C): 36.8 (06-12-18 @ 09:00)  T(F): 98.2 (06-12-18 @ 09:00), Max: 98.6 (06-11-18 @ 21:21)  HR: 113 (06-12-18 @ 09:00) (109 - 114)  BP: 122/76 (06-12-18 @ 09:00) (113/64 - 122/76)  RR:  (18 - 18)  SpO2:  (94% - 97%)  Wt(kg): --  GENERAL: NAD, well-developed, sitting up in bed   EYES: No proptosis, sclera anicteric  HEENT:  Atraumatic, Normocephalic, moist mucous membranes  SKIN: Dry, intact, No diffuse rashes     POCT Blood Glucose.: 169 mg/dL (06-12-18 @ 08:03)  POCT Blood Glucose.: 145 mg/dL (06-11-18 @ 21:46)  POCT Blood Glucose.: 235 mg/dL (06-11-18 @ 16:46)  POCT Blood Glucose.: 260 mg/dL (06-11-18 @ 12:22)  POCT Blood Glucose.: 134 mg/dL (06-11-18 @ 08:23)  POCT Blood Glucose.: 164 mg/dL (06-10-18 @ 21:26)  POCT Blood Glucose.: 208 mg/dL (06-10-18 @ 16:45)  POCT Blood Glucose.: 142 mg/dL (06-10-18 @ 12:09)  POCT Blood Glucose.: 93 mg/dL (06-10-18 @ 08:01)  POCT Blood Glucose.: 177 mg/dL (06-09-18 @ 21:54)  POCT Blood Glucose.: 142 mg/dL (06-09-18 @ 16:52)  POCT Blood Glucose.: 93 mg/dL (06-09-18 @ 12:22)      06-12    134<L>  |  95<L>  |  9   ----------------------------<  134<H>  3.9   |  28  |  0.40<L>    EGFR if : 143  EGFR if non : 124    Ca    8.7      06-12    TPro  6.9  /  Alb  2.2<L>  /  TBili  0.6  /  DBili  x   /  AST  80<H>  /  ALT  23  /  AlkPhos  634<H>  06-11          Thyroid Function Tests:  06-09 @ 08:24 TSH 1.90 FreeT4 -- T3 -- Anti TPO -- Anti Thyroglobulin Ab -- TSI --      Hemoglobin A1C, Whole Blood: 8.2 % <H> [4.0 - 5.6] (06-07-18 @ 03:08)

## 2018-06-12 NOTE — PROGRESS NOTE ADULT - PROBLEM SELECTOR PROBLEM 2
Diabetes mellitus type 2, insulin dependent

## 2018-06-12 NOTE — PROGRESS NOTE ADULT - PROBLEM SELECTOR PLAN 2
Monitor finger sticks on Lantus, sliding scale.  D/C planning on Lantus and Prandin today.   Endo follow up out patient.

## 2018-06-12 NOTE — ADVANCED PRACTICE NURSE CONSULT - RECOMMEDATIONS
Will recommend the followin. Sacrum; Cavilon to periwound skin, Allevyn foam dressing change every 3 days and prn for drainage  2. Continue to encourage mobility  3. Seat cushion when OOB to chair  4. z-shelby boots to off-load the heels.  5. nutrition support as pt condition allows  Tx plan discussed with RN

## 2018-06-12 NOTE — CONSULT NOTE ADULT - SUBJECTIVE AND OBJECTIVE BOX
Wound SURGERY CONSULT NOTE    HPI:  69 yo male w/ PMH/o HTN, type 2 diabetes mellitus (on Lantus and Invokana), HLD, and stage 4 neuroendocrine CA (not on current chemo, finished radiation 2 weeks prior, currently awaiting trial therapy) presents to ED c/o weakness. Prior to arrival pt had a pre-syncopal episode after eating, however never lost consciousness or had trauma. Patient was able to get up and ambulate afterwards. Patient has baseline dizziness. Patient had missed doses since he assumed he should not be administering it if he is not eating that much. Since patient's last radiation therapy in May, patient has had decreased appetite, and has only eaten one meal per day. Patient feels hungry but has early satiety per daughter. Patient has also had increased frequency of urinary at home, but without dysuria. Of note patient had CT scan on 5/30 showing metastasis to liver and new nodule in lung suggestive of metastasis. Daughter called Dr. Yari Dick pt's oncologist at [facility] and she advised them to come to ED for eval.     In the ED, pt noted to be in DKA with an AG of 31, decreased bicarb, mild acidosis with BHB of 6.6. In the ER, recevied Ceftriaxone, Humalog 6U, Ativan, 2L fluids. Pt was subsequently admitted to the MICU where he was started on D10 and insulin gtt for DKA with FS q1h and BMP q4h. Patient was noted to have positive UA and was treated empirically with ceftriaxone for UTI. Patient's electrolytes was repleted. Upon closure of his AG , p restarted on Lantus 10U w/ ISS, insulin gtt was d/c'ed and pt was started on diet. On 6/8, pt noted to have AG of 11, BHB of 0.4. On 6/8/18 pt was deemed medically optimized for step down to medicine floor for continued care where pt continues to improve and is being made ready for d/c home today.    Pt was noted with a small sacral wound.  Consult requested today to assist w/ management.  Pt's daughter & sister at bedside.  Daughter states this wound predates his hospital admission.  He had been increasingly sedentary & lethargic prior to hospitalization & she noted skin breakdown.  Daughter is concerned how to manage it when she gets him home becuase of his overall medical condition and diabetes.   We discussed importance of nutrition and offloading and pericare which has been well adhered to in the hospital.  Daughter was happy that he was eating better and gained 6lbs.   Offloading & pericare protocols were maintained.  pt is continent of stool & urine.  Foam is comfortable to him, less pain.  Scant clear drainage contained.  no odor, redness, warmth, f/c/s noted.   Instructed daughter to take home offloading devices as they are single pt use & will continue to be helpful at home to her father.   All questions asked and answered to pt & family's satisfaction.       PAST MEDICAL & SURGICAL HISTORY:  Hyperlipidemia  Diabetes  HTN (hypertension)    REVIEW OF SYSTEMS  	  Skin: see HPI  All other systems negative    MEDICATIONS  (STANDING):  dextrose 5%. 1000 milliLiter(s) (50 mL/Hr) IV Continuous <Continuous>  dextrose 5%. 1000 milliLiter(s) (50 mL/Hr) IV Continuous <Continuous>  dextrose 50% Injectable 12.5 Gram(s) IV Push once  dextrose 50% Injectable 25 Gram(s) IV Push once  dextrose 50% Injectable 25 Gram(s) IV Push once  docusate sodium 100 milliGRAM(s) Oral three times a day  heparin  Injectable 5000 Unit(s) SubCutaneous every 8 hours  insulin glargine Injectable (LANTUS) 8 Unit(s) SubCutaneous <User Schedule>  insulin lispro (HumaLOG) corrective regimen sliding scale   SubCutaneous three times a day before meals  insulin lispro (HumaLOG) corrective regimen sliding scale   SubCutaneous at bedtime  insulin lispro Injectable (HumaLOG) 2 Unit(s) SubCutaneous three times a day with meals  polyethylene glycol 3350 17 Gram(s) Oral daily  senna 2 Tablet(s) Oral at bedtime    MEDICATIONS  (PRN):  ALPRAZolam 0.5 milliGRAM(s) Oral daily PRN anxiety  dextrose 40% Gel 15 Gram(s) Oral once PRN Blood Glucose LESS THAN 70 milliGRAM(s)/deciLiter  glucagon  Injectable 1 milliGRAM(s) IntraMuscular once PRN Glucose <70 milliGRAM(s)/deciLiter  morphine  IR 15 milliGRAM(s) Oral every 4 hours PRN Severe Pain (7 - 10)    No Known Allergies    SOCIAL HISTORY:  ; Denies smoking, ETOH, drugs    FAMILY HISTORY:  No pertinent family history in first degree relatives      Vital Signs Last 24 Hrs  T(C): 36.8 (12 Jun 2018 09:00), Max: 37 (11 Jun 2018 21:21)  T(F): 98.2 (12 Jun 2018 09:00), Max: 98.6 (11 Jun 2018 21:21)  HR: 113 (12 Jun 2018 09:00) (109 - 114)  BP: 122/76 (12 Jun 2018 09:00) (113/64 - 122/76)  BP(mean): --  RR: 18 (12 Jun 2018 09:00) (18 - 18)  SpO2: 94% (12 Jun 2018 09:00) (94% - 97%)    NAD / A&Ox3  cachectic/  frail  WD/ WN/ WG  Versa Care P500 bed    Cardiovascular: RRR     Respiratory: CTA    Gastrointestinal soft NT/ND (+)BS      Neurology  weakened strength & sensation grossly intact    Musculoskeletal/Vascular: FROM x4  no  edema   BLE equally warm  no acute ischemia noted  no deformities/ contractures    Skin:  moist but frail  w/ decreased turgor    Sacrum unstageable pressure ulcer  0.9cm x 0.5cm x 0.3 cm  wound base w/ fibrinous exudate/ slough  Protuberant sacrum/ coccyx bone  (+)serosanguinous drainage  No odor, erythema, increased warmth, tenderness, induration, fluctuance    LABS:  06-12    134<L>  |  95<L>  |  9   ----------------------------<  134<H>  3.9   |  28  |  0.40<L>    Ca    8.7      12 Jun 2018 06:25    TPro  6.9  /  Alb  2.2<L>  /  TBili  0.6  /  DBili  x   /  AST  80<H>  /  ALT  23  /  AlkPhos  634<H>  06-11                          11.1   8.8   )-----------( 262      ( 12 Jun 2018 06:26 )             35.1           RADIOLOGY & ADDITIONAL STUDIES:    < from: CT Abdomen and Pelvis w/ Oral Cont and w/ IV Cont (05.30.18 @ 14:25) >  FINDINGS:    CHEST:     LUNGS AND LARGE AIRWAYS: Patent central airways. New scattered 3 to 4 mm   lung nodule suspicious for metastatic disease.  PLEURA: Trace right pleural effusion.  VESSELS: Within normal limits.  HEART: Heart size is normal. No pericardial effusion.  MEDIASTINUM AND NOHEMI: 1.4 x 1.4 cm right hilar adenopathy, previously   measured 1.0 x 1.0 cm. There is no axillary or left hilar adenopathy.  CHEST WALL AND LOWER NECK: Within normal limits.    ABDOMEN AND PELVIS:    LIVER: The liver is enlarged which nodule contour suggestive of   pseudocirrhosis. There are innumerable hypervascular hepatic lesions   which had increased in size and number as compared withthe prior study.   Reference lesions include:    Segment 8 lesion measuring 3.9 x 3.7 cm previously measuring 2.2 x 2.3 cm.    BILE DUCTS: Normal caliber.  GALLBLADDER: Within normal limits.  SPLEEN: Mild splenomegaly.   PANCREAS: Within normal limits.  ADRENALS: Within normal limits.  KIDNEYS/URETERS: The kidneys are normal in size without hydronephrosis.   There is 3.5 cm simple appearing cyst in the midpole of the left kidney   and additional scattered smaller cyst in the right kidney.    BLADDER: Within normal limits.  REPRODUCTIVE ORGANS: The prostate is within normal limits.    BOWEL: No bowel obstruction.   PERITONEUM: No ascites.  VESSELS:  Mild atherosclerosis calcifications of the abdominal aorta and   its branches.  RETROPERITONEUM: No lymphadenopathy.    ABDOMINAL WALL: Small fat-containing right inguinal hernia.  BONES: Diffuse osseous sclerotic metastases, not significantly changed.    IMPRESSION: Diffuse hepatic metastases which had increased in size and   number as compared with the prior study.    Subcentimeter lung nodule suspicious for metastatic disease.    Diffuse osseous sclerotic metastasis.    < end of copied text >    Cultures:  Culture - Urine (06.06.18 @ 17:20)    Specimen Source: .Urine Clean Catch (Midstream)    Culture Results:   Normal Urogenital kira present    Culture - Blood (06.06.18 @ 15:56)    Specimen Source: .Blood Blood-Venous    Culture Results:   No growth at 5 days.

## 2018-06-12 NOTE — PROGRESS NOTE ADULT - ASSESSMENT
67 y/o M with uncontrolled DM2, HTN, HLD, presents with euglycemic DKA in setting of Invokana use. Yesterday, endo NP had discussed w/pt and daughter, recommend discontinuing invokana at home. Recommend prandin w/meals in addition to taking Lantus daily in AM. Daughter was not testing glucose values, discussed obtaining new meter and begin testing to assess for hyper/hypoglycemia to aid in management. Recommended following up with endocrinologist but daughter would like to receive referral from PCP to someone close to their home. BG goal (140-180mg/dl).

## 2018-06-12 NOTE — PROGRESS NOTE ADULT - PROBLEM SELECTOR PLAN 1
Inpatient:  -test BG AC/HS  -c/w Lantus 8 units daily (at 3pm)-can move to AM upon discharge  -Continue Humalog 2 units AC meals while inpatient (hold if not eating)  -c/w humalog low correction scale AC and low HS scale  Discharge plan:  -c/w Lantus daily in AM (8 units) + Prandin 0.5mg before meals (hold if not eating).   -Please send RX for Prandin, Glucometer, test strips and lancets on discharge   -Pt to follow up w/PCP Dr Marquise Tse-daughter to schedule appointment prior to discharge-please document appointment on discharge document

## 2018-06-12 NOTE — PROGRESS NOTE ADULT - ASSESSMENT
67 yo [place] speaking male PMHX HTN, type 2 diabetes mellitus (on Lantus), HLD, Anxiety on PRN Alprazolam,  and stage 4 Neuroendocrine CA (not on current chemo, finished radiation 2 weeks prior, currently awaiting trial therapy) was admitted to the hospital on 6/6/18 due to c/o weakness, presyncope, dizziness was found to have elevated anion gap to 31, decreased bicarb, glucosuria, mild acidosis with increased BHB of 6.6 likely consistent with mild DKA which has since resolved.

## 2018-06-12 NOTE — CONSULT NOTE ADULT - ASSESSMENT
A/P:    67 yo male PMHX HTN, type 2 diabetes mellitus (on Lantus), HLD, Anxiety on PRN Alprazolam,  and stage 4 Neuroendocrine CA (not on current chemo, finished radiation 2 weeks prior, currently awaiting trial therapy) was admitted to the hospital on 6/6/18 due to c/o weakness, presyncope, dizziness was found to have elevated anion gap to 31, decreased bicarb, glucosuria, mild acidosis with increased BHB of 6.6 likely consistent with mild DKA which has since resolved.        Unstageable sacral pressure ulcer- Allevyn foam  BLE elevation  Abx per Medicine/ ID  Moisturize intact skin w/ SWEEN cream BID  con't Nutrition (as tolerated), Nutrition Consult  con't Offloading   con't Pericare  Care as per medicine will follow w/ you  Upon discharge [facility] [address] [place] [address] [phone]  Seen w/ attng and D/w team  Thank you for this consult  Mica Berman PA-C [id number]

## 2018-06-12 NOTE — PROGRESS NOTE ADULT - PROBLEM SELECTOR PLAN 1
s/p ketoacidosis in the setting of SGL2 inhibitor use.  Now resolved with closed AG, endo following, monitor   Patient is currently on Lantus 8 units w/ ISS, d/c planning on Lantus and Prandin.   Time spent coordinating plan 41 minutes.

## 2018-06-13 ENCOUNTER — OUTPATIENT (OUTPATIENT)
Dept: OUTPATIENT SERVICES | Facility: HOSPITAL | Age: 68
LOS: 1 days | Discharge: HOME | End: 2018-06-13

## 2018-06-13 DIAGNOSIS — C7A.1 MALIGNANT POORLY DIFFERENTIATED NEUROENDOCRINE TUMORS: ICD-10-CM

## 2018-06-14 ENCOUNTER — APPOINTMENT (OUTPATIENT)
Dept: HEMATOLOGY ONCOLOGY | Facility: CLINIC | Age: 68
End: 2018-06-14
Payer: COMMERCIAL

## 2018-06-14 ENCOUNTER — INPATIENT (INPATIENT)
Facility: HOSPITAL | Age: 68
LOS: 7 days | Discharge: HOME | DRG: 871 | End: 2018-06-22
Attending: INTERNAL MEDICINE | Admitting: HOSPITALIST
Payer: COMMERCIAL

## 2018-06-14 ENCOUNTER — APPOINTMENT (OUTPATIENT)
Dept: HEPATOLOGY | Facility: CLINIC | Age: 68
End: 2018-06-14

## 2018-06-14 VITALS
DIASTOLIC BLOOD PRESSURE: 70 MMHG | HEART RATE: 132 BPM | WEIGHT: 108.03 LBS | OXYGEN SATURATION: 98 % | TEMPERATURE: 97.1 F | RESPIRATION RATE: 18 BRPM | SYSTOLIC BLOOD PRESSURE: 120 MMHG | BODY MASS INDEX: 19.76 KG/M2

## 2018-06-14 VITALS
HEART RATE: 122 BPM | RESPIRATION RATE: 20 BRPM | DIASTOLIC BLOOD PRESSURE: 82 MMHG | SYSTOLIC BLOOD PRESSURE: 130 MMHG | OXYGEN SATURATION: 98 %

## 2018-06-14 DIAGNOSIS — I10 ESSENTIAL (PRIMARY) HYPERTENSION: ICD-10-CM

## 2018-06-14 DIAGNOSIS — E11.9 TYPE 2 DIABETES MELLITUS WITHOUT COMPLICATIONS: ICD-10-CM

## 2018-06-14 DIAGNOSIS — Z71.89 OTHER SPECIFIED COUNSELING: ICD-10-CM

## 2018-06-14 DIAGNOSIS — Z29.9 ENCOUNTER FOR PROPHYLACTIC MEASURES, UNSPECIFIED: ICD-10-CM

## 2018-06-14 DIAGNOSIS — J18.9 PNEUMONIA, UNSPECIFIED ORGANISM: ICD-10-CM

## 2018-06-14 DIAGNOSIS — G89.3 NEOPLASM RELATED PAIN (ACUTE) (CHRONIC): ICD-10-CM

## 2018-06-14 DIAGNOSIS — R00.0 TACHYCARDIA, UNSPECIFIED: ICD-10-CM

## 2018-06-14 DIAGNOSIS — R53.1 WEAKNESS: ICD-10-CM

## 2018-06-14 DIAGNOSIS — N39.0 URINARY TRACT INFECTION, SITE NOT SPECIFIED: ICD-10-CM

## 2018-06-14 DIAGNOSIS — C7A.1 MALIGNANT POORLY DIFFERENTIATED NEUROENDOCRINE TUMORS: ICD-10-CM

## 2018-06-14 DIAGNOSIS — C7A.8 OTHER MALIGNANT NEUROENDOCRINE TUMORS: ICD-10-CM

## 2018-06-14 DIAGNOSIS — A41.9 SEPSIS, UNSPECIFIED ORGANISM: ICD-10-CM

## 2018-06-14 LAB
ALBUMIN SERPL ELPH-MCNC: 2.9 G/DL — LOW (ref 3.3–5)
ALP SERPL-CCNC: 1107 U/L — HIGH (ref 40–120)
ALT FLD-CCNC: 38 U/L — SIGNIFICANT CHANGE UP (ref 10–45)
ANION GAP SERPL CALC-SCNC: 14 MMOL/L — SIGNIFICANT CHANGE UP (ref 5–17)
ANION GAP SERPL CALC-SCNC: 20 MMOL/L — HIGH (ref 5–17)
APPEARANCE UR: ABNORMAL
AST SERPL-CCNC: 200 U/L — HIGH (ref 10–40)
B-OH-BUTYR SERPL-SCNC: 1.8 MMOL/L — HIGH
BACTERIA # UR AUTO: ABNORMAL /HPF
BASE EXCESS BLDV CALC-SCNC: 0.4 MMOL/L — SIGNIFICANT CHANGE UP (ref -2–2)
BASE EXCESS BLDV CALC-SCNC: 0.9 MMOL/L — SIGNIFICANT CHANGE UP (ref -2–2)
BASOPHILS # BLD AUTO: 0 K/UL — SIGNIFICANT CHANGE UP (ref 0–0.2)
BASOPHILS NFR BLD AUTO: 0.1 % — SIGNIFICANT CHANGE UP (ref 0–2)
BILIRUB SERPL-MCNC: 0.9 MG/DL — SIGNIFICANT CHANGE UP (ref 0.2–1.2)
BILIRUB UR-MCNC: ABNORMAL
BUN SERPL-MCNC: 16 MG/DL — SIGNIFICANT CHANGE UP (ref 7–23)
BUN SERPL-MCNC: 20 MG/DL — SIGNIFICANT CHANGE UP (ref 7–23)
CA-I SERPL-SCNC: 1.04 MMOL/L — LOW (ref 1.12–1.3)
CALCIUM SERPL-MCNC: 8.1 MG/DL — LOW (ref 8.4–10.5)
CALCIUM SERPL-MCNC: 8.3 MG/DL — LOW (ref 8.4–10.5)
CHLORIDE BLDV-SCNC: 94 MMOL/L — LOW (ref 96–108)
CHLORIDE SERPL-SCNC: 87 MMOL/L — LOW (ref 96–108)
CHLORIDE SERPL-SCNC: 94 MMOL/L — LOW (ref 96–108)
CO2 BLDV-SCNC: 26 MMOL/L — SIGNIFICANT CHANGE UP (ref 22–30)
CO2 BLDV-SCNC: 27 MMOL/L — SIGNIFICANT CHANGE UP (ref 22–30)
CO2 SERPL-SCNC: 23 MMOL/L — SIGNIFICANT CHANGE UP (ref 22–31)
CO2 SERPL-SCNC: 24 MMOL/L — SIGNIFICANT CHANGE UP (ref 22–31)
COLOR SPEC: YELLOW — SIGNIFICANT CHANGE UP
COMMENT - URINE: SIGNIFICANT CHANGE UP
CREAT SERPL-MCNC: 0.55 MG/DL — SIGNIFICANT CHANGE UP (ref 0.5–1.3)
CREAT SERPL-MCNC: 0.56 MG/DL — SIGNIFICANT CHANGE UP (ref 0.5–1.3)
DIFF PNL FLD: ABNORMAL
EOSINOPHIL # BLD AUTO: 0 K/UL — SIGNIFICANT CHANGE UP (ref 0–0.5)
EOSINOPHIL NFR BLD AUTO: 0.2 % — SIGNIFICANT CHANGE UP (ref 0–6)
EPI CELLS # UR: SIGNIFICANT CHANGE UP /HPF
GAS PNL BLDV: 124 MMOL/L — LOW (ref 136–145)
GAS PNL BLDV: SIGNIFICANT CHANGE UP
GAS PNL BLDV: SIGNIFICANT CHANGE UP
GLUCOSE BLDC GLUCOMTR-MCNC: 145 MG/DL — HIGH (ref 70–99)
GLUCOSE BLDC GLUCOMTR-MCNC: 251 MG/DL — HIGH (ref 70–99)
GLUCOSE BLDV-MCNC: 249 MG/DL — HIGH (ref 70–99)
GLUCOSE SERPL-MCNC: 160 MG/DL — HIGH (ref 70–99)
GLUCOSE SERPL-MCNC: 256 MG/DL — HIGH (ref 70–99)
GLUCOSE UR QL: >1000 MG/DL
HCO3 BLDV-SCNC: 25 MMOL/L — SIGNIFICANT CHANGE UP (ref 21–29)
HCO3 BLDV-SCNC: 25 MMOL/L — SIGNIFICANT CHANGE UP (ref 21–29)
HCT VFR BLD CALC: 32.9 % — LOW (ref 39–50)
HCT VFR BLDA CALC: 33 % — LOW (ref 39–50)
HGB BLD CALC-MCNC: 10.8 G/DL — LOW (ref 13–17)
HGB BLD-MCNC: 10.9 G/DL — LOW (ref 13–17)
HOROWITZ INDEX BLDV+IHG-RTO: 21 — SIGNIFICANT CHANGE UP
HYALINE CASTS # UR AUTO: ABNORMAL
KETONES UR-MCNC: ABNORMAL
LACTATE BLDV-MCNC: 1.5 MMOL/L — SIGNIFICANT CHANGE UP (ref 0.7–2)
LACTATE BLDV-MCNC: 3.9 MMOL/L — HIGH (ref 0.7–2)
LEUKOCYTE ESTERASE UR-ACNC: ABNORMAL
LYMPHOCYTES # BLD AUTO: 0.9 K/UL — LOW (ref 1–3.3)
LYMPHOCYTES # BLD AUTO: 7 % — LOW (ref 13–44)
MCHC RBC-ENTMCNC: 28.1 PG — SIGNIFICANT CHANGE UP (ref 27–34)
MCHC RBC-ENTMCNC: 33 GM/DL — SIGNIFICANT CHANGE UP (ref 32–36)
MCV RBC AUTO: 85.3 FL — SIGNIFICANT CHANGE UP (ref 80–100)
MONOCYTES # BLD AUTO: 1.2 K/UL — HIGH (ref 0–0.9)
MONOCYTES NFR BLD AUTO: 9.1 % — SIGNIFICANT CHANGE UP (ref 2–14)
NEUTROPHILS # BLD AUTO: 10.8 K/UL — HIGH (ref 1.8–7.4)
NEUTROPHILS NFR BLD AUTO: 83.6 % — HIGH (ref 43–77)
NITRITE UR-MCNC: NEGATIVE — SIGNIFICANT CHANGE UP
PCO2 BLDV: 41 MMHG — SIGNIFICANT CHANGE UP (ref 35–50)
PCO2 BLDV: 43 MMHG — SIGNIFICANT CHANGE UP (ref 35–50)
PH BLDV: 7.39 — SIGNIFICANT CHANGE UP (ref 7.35–7.45)
PH BLDV: 7.4 — SIGNIFICANT CHANGE UP (ref 7.35–7.45)
PH UR: 6 — SIGNIFICANT CHANGE UP (ref 5–8)
PLATELET # BLD AUTO: 228 K/UL — SIGNIFICANT CHANGE UP (ref 150–400)
PO2 BLDV: 30 MMHG — SIGNIFICANT CHANGE UP (ref 25–45)
PO2 BLDV: 48 MMHG — HIGH (ref 25–45)
POTASSIUM BLDV-SCNC: 6.5 MMOL/L — CRITICAL HIGH (ref 3.5–5.3)
POTASSIUM SERPL-MCNC: 3.6 MMOL/L — SIGNIFICANT CHANGE UP (ref 3.5–5.3)
POTASSIUM SERPL-MCNC: 4.9 MMOL/L — SIGNIFICANT CHANGE UP (ref 3.5–5.3)
POTASSIUM SERPL-SCNC: 3.6 MMOL/L — SIGNIFICANT CHANGE UP (ref 3.5–5.3)
POTASSIUM SERPL-SCNC: 4.9 MMOL/L — SIGNIFICANT CHANGE UP (ref 3.5–5.3)
PROT SERPL-MCNC: 7.9 G/DL — SIGNIFICANT CHANGE UP (ref 6–8.3)
PROT UR-MCNC: 100 MG/DL
RAPID RVP RESULT: SIGNIFICANT CHANGE UP
RBC # BLD: 3.86 M/UL — LOW (ref 4.2–5.8)
RBC # FLD: 16.8 % — HIGH (ref 10.3–14.5)
RBC CASTS # UR COMP ASSIST: ABNORMAL /HPF (ref 0–2)
SAO2 % BLDV: 43 % — LOW (ref 67–88)
SAO2 % BLDV: 77 % — SIGNIFICANT CHANGE UP (ref 67–88)
SODIUM SERPL-SCNC: 130 MMOL/L — LOW (ref 135–145)
SODIUM SERPL-SCNC: 132 MMOL/L — LOW (ref 135–145)
SP GR SPEC: 1.02 — SIGNIFICANT CHANGE UP (ref 1.01–1.02)
UROBILINOGEN FLD QL: 2 MG/DL
WBC # BLD: 12.9 K/UL — HIGH (ref 3.8–10.5)
WBC # FLD AUTO: 12.9 K/UL — HIGH (ref 3.8–10.5)
WBC UR QL: SIGNIFICANT CHANGE UP /HPF (ref 0–5)

## 2018-06-14 PROCEDURE — 93010 ELECTROCARDIOGRAM REPORT: CPT

## 2018-06-14 PROCEDURE — 99215 OFFICE O/P EST HI 40 MIN: CPT

## 2018-06-14 PROCEDURE — 99223 1ST HOSP IP/OBS HIGH 75: CPT | Mod: GC

## 2018-06-14 PROCEDURE — 71045 X-RAY EXAM CHEST 1 VIEW: CPT | Mod: 26

## 2018-06-14 PROCEDURE — 99284 EMERGENCY DEPT VISIT MOD MDM: CPT

## 2018-06-14 RX ORDER — CEFEPIME 1 G/1
1000 INJECTION, POWDER, FOR SOLUTION INTRAMUSCULAR; INTRAVENOUS EVERY 8 HOURS
Qty: 0 | Refills: 0 | Status: DISCONTINUED | OUTPATIENT
Start: 2018-06-14 | End: 2018-06-14

## 2018-06-14 RX ORDER — DOCUSATE SODIUM 100 MG
100 CAPSULE ORAL THREE TIMES A DAY
Qty: 0 | Refills: 0 | Status: DISCONTINUED | OUTPATIENT
Start: 2018-06-14 | End: 2018-06-22

## 2018-06-14 RX ORDER — MORPHINE SULFATE 50 MG/1
2 CAPSULE, EXTENDED RELEASE ORAL ONCE
Qty: 0 | Refills: 0 | Status: DISCONTINUED | OUTPATIENT
Start: 2018-06-14 | End: 2018-06-14

## 2018-06-14 RX ORDER — SODIUM CHLORIDE 9 MG/ML
1000 INJECTION INTRAMUSCULAR; INTRAVENOUS; SUBCUTANEOUS ONCE
Qty: 0 | Refills: 0 | Status: COMPLETED | OUTPATIENT
Start: 2018-06-14 | End: 2018-06-14

## 2018-06-14 RX ORDER — ENOXAPARIN SODIUM 100 MG/ML
40 INJECTION SUBCUTANEOUS DAILY
Qty: 0 | Refills: 0 | Status: DISCONTINUED | OUTPATIENT
Start: 2018-06-14 | End: 2018-06-16

## 2018-06-14 RX ORDER — DEXTROSE 50 % IN WATER 50 %
25 SYRINGE (ML) INTRAVENOUS ONCE
Qty: 0 | Refills: 0 | Status: DISCONTINUED | OUTPATIENT
Start: 2018-06-14 | End: 2018-06-22

## 2018-06-14 RX ORDER — HUMAN INSULIN 100 [IU]/ML
8 INJECTION, SUSPENSION SUBCUTANEOUS ONCE
Qty: 0 | Refills: 0 | Status: COMPLETED | OUTPATIENT
Start: 2018-06-14 | End: 2018-06-14

## 2018-06-14 RX ORDER — CEFEPIME 1 G/1
1000 INJECTION, POWDER, FOR SOLUTION INTRAMUSCULAR; INTRAVENOUS ONCE
Qty: 0 | Refills: 0 | Status: COMPLETED | OUTPATIENT
Start: 2018-06-14 | End: 2018-06-14

## 2018-06-14 RX ORDER — VANCOMYCIN HCL 1 G
1000 VIAL (EA) INTRAVENOUS EVERY 12 HOURS
Qty: 0 | Refills: 0 | Status: DISCONTINUED | OUTPATIENT
Start: 2018-06-14 | End: 2018-06-16

## 2018-06-14 RX ORDER — POLYETHYLENE GLYCOL 3350 17 G/17G
17 POWDER, FOR SOLUTION ORAL DAILY
Qty: 0 | Refills: 0 | Status: DISCONTINUED | OUTPATIENT
Start: 2018-06-14 | End: 2018-06-21

## 2018-06-14 RX ORDER — DEXTROSE 50 % IN WATER 50 %
12.5 SYRINGE (ML) INTRAVENOUS ONCE
Qty: 0 | Refills: 0 | Status: DISCONTINUED | OUTPATIENT
Start: 2018-06-14 | End: 2018-06-22

## 2018-06-14 RX ORDER — INSULIN LISPRO 100/ML
VIAL (ML) SUBCUTANEOUS AT BEDTIME
Qty: 0 | Refills: 0 | Status: DISCONTINUED | OUTPATIENT
Start: 2018-06-14 | End: 2018-06-22

## 2018-06-14 RX ORDER — SODIUM CHLORIDE 9 MG/ML
1000 INJECTION INTRAMUSCULAR; INTRAVENOUS; SUBCUTANEOUS
Qty: 0 | Refills: 0 | Status: DISCONTINUED | OUTPATIENT
Start: 2018-06-14 | End: 2018-06-17

## 2018-06-14 RX ORDER — SENNA PLUS 8.6 MG/1
2 TABLET ORAL AT BEDTIME
Qty: 0 | Refills: 0 | Status: DISCONTINUED | OUTPATIENT
Start: 2018-06-14 | End: 2018-06-19

## 2018-06-14 RX ORDER — OXYCODONE HYDROCHLORIDE 5 MG/1
5 TABLET ORAL EVERY 4 HOURS
Qty: 0 | Refills: 0 | Status: DISCONTINUED | OUTPATIENT
Start: 2018-06-14 | End: 2018-06-15

## 2018-06-14 RX ORDER — ALPRAZOLAM 0.25 MG
0.5 TABLET ORAL EVERY 12 HOURS
Qty: 0 | Refills: 0 | Status: DISCONTINUED | OUTPATIENT
Start: 2018-06-14 | End: 2018-06-19

## 2018-06-14 RX ORDER — DEXTROSE 50 % IN WATER 50 %
15 SYRINGE (ML) INTRAVENOUS ONCE
Qty: 0 | Refills: 0 | Status: DISCONTINUED | OUTPATIENT
Start: 2018-06-14 | End: 2018-06-22

## 2018-06-14 RX ORDER — CEFEPIME 1 G/1
1000 INJECTION, POWDER, FOR SOLUTION INTRAMUSCULAR; INTRAVENOUS EVERY 8 HOURS
Qty: 0 | Refills: 0 | Status: DISCONTINUED | OUTPATIENT
Start: 2018-06-14 | End: 2018-06-16

## 2018-06-14 RX ORDER — ONDANSETRON 8 MG/1
8 TABLET, FILM COATED ORAL THREE TIMES A DAY
Qty: 0 | Refills: 0 | Status: DISCONTINUED | OUTPATIENT
Start: 2018-06-14 | End: 2018-06-16

## 2018-06-14 RX ORDER — INSULIN GLARGINE 100 [IU]/ML
6 INJECTION, SOLUTION SUBCUTANEOUS EVERY MORNING
Qty: 0 | Refills: 0 | Status: DISCONTINUED | OUTPATIENT
Start: 2018-06-15 | End: 2018-06-19

## 2018-06-14 RX ORDER — INSULIN LISPRO 100/ML
VIAL (ML) SUBCUTANEOUS
Qty: 0 | Refills: 0 | Status: DISCONTINUED | OUTPATIENT
Start: 2018-06-14 | End: 2018-06-22

## 2018-06-14 RX ORDER — SODIUM CHLORIDE 9 MG/ML
1000 INJECTION, SOLUTION INTRAVENOUS
Qty: 0 | Refills: 0 | Status: DISCONTINUED | OUTPATIENT
Start: 2018-06-14 | End: 2018-06-22

## 2018-06-14 RX ORDER — MORPHINE SULFATE 50 MG/1
15 CAPSULE, EXTENDED RELEASE ORAL EVERY 12 HOURS
Qty: 0 | Refills: 0 | Status: DISCONTINUED | OUTPATIENT
Start: 2018-06-14 | End: 2018-06-15

## 2018-06-14 RX ORDER — REPAGLINIDE 0.5 MG/1
0.5 TABLET ORAL 3 TIMES DAILY
Refills: 0 | Status: ACTIVE | COMMUNITY
Start: 2018-06-14

## 2018-06-14 RX ORDER — GLUCAGON INJECTION, SOLUTION 0.5 MG/.1ML
1 INJECTION, SOLUTION SUBCUTANEOUS ONCE
Qty: 0 | Refills: 0 | Status: DISCONTINUED | OUTPATIENT
Start: 2018-06-14 | End: 2018-06-22

## 2018-06-14 RX ADMIN — SODIUM CHLORIDE 100 MILLILITER(S): 9 INJECTION INTRAMUSCULAR; INTRAVENOUS; SUBCUTANEOUS at 21:18

## 2018-06-14 RX ADMIN — Medication 1: at 21:50

## 2018-06-14 RX ADMIN — MORPHINE SULFATE 2 MILLIGRAM(S): 50 CAPSULE, EXTENDED RELEASE ORAL at 16:45

## 2018-06-14 RX ADMIN — SENNA PLUS 2 TABLET(S): 8.6 TABLET ORAL at 21:49

## 2018-06-14 RX ADMIN — CEFEPIME 100 MILLIGRAM(S): 1 INJECTION, POWDER, FOR SOLUTION INTRAMUSCULAR; INTRAVENOUS at 22:55

## 2018-06-14 RX ADMIN — Medication 100 MILLIGRAM(S): at 21:49

## 2018-06-14 RX ADMIN — MORPHINE SULFATE 2 MILLIGRAM(S): 50 CAPSULE, EXTENDED RELEASE ORAL at 17:50

## 2018-06-14 RX ADMIN — CEFEPIME 100 MILLIGRAM(S): 1 INJECTION, POWDER, FOR SOLUTION INTRAMUSCULAR; INTRAVENOUS at 17:36

## 2018-06-14 RX ADMIN — SODIUM CHLORIDE 2000 MILLILITER(S): 9 INJECTION INTRAMUSCULAR; INTRAVENOUS; SUBCUTANEOUS at 22:23

## 2018-06-14 RX ADMIN — SODIUM CHLORIDE 1000 MILLILITER(S): 9 INJECTION INTRAMUSCULAR; INTRAVENOUS; SUBCUTANEOUS at 15:10

## 2018-06-14 RX ADMIN — MORPHINE SULFATE 15 MILLIGRAM(S): 50 CAPSULE, EXTENDED RELEASE ORAL at 22:48

## 2018-06-14 RX ADMIN — ENOXAPARIN SODIUM 40 MILLIGRAM(S): 100 INJECTION SUBCUTANEOUS at 21:49

## 2018-06-14 RX ADMIN — MORPHINE SULFATE 15 MILLIGRAM(S): 50 CAPSULE, EXTENDED RELEASE ORAL at 21:49

## 2018-06-14 RX ADMIN — HUMAN INSULIN 8 UNIT(S): 100 INJECTION, SUSPENSION SUBCUTANEOUS at 21:49

## 2018-06-14 NOTE — H&P ADULT - PROBLEM SELECTOR PLAN 2
- - + UA, will f/u urine culture (no prior urine cultures)  - c/w cefepime for now to cover for PNA / UTI - pt w/ L base opacity on CXR   - reports dry cough since discharge   - check RVP, c/w cefepime/vanc for hospital acquired PNA

## 2018-06-14 NOTE — H&P ADULT - PROBLEM SELECTOR PLAN 9
- discussed with daughter Yari - patient does not have HCP, daughter states her and her mother make decisions together and that she is primary caregiver. Currently full code, she has not thought about advanced directives, she is hopeful he will get through this possible infection and potentially receive further chemotherapy.

## 2018-06-14 NOTE — H&P ADULT - NSHPPHYSICALEXAM_GEN_ALL_CORE
Vital Signs Last 24 Hrs  T(C): 37.3 (14 Jun 2018 18:46), Max: 38.2 (14 Jun 2018 15:00)  T(F): 99.2 (14 Jun 2018 18:46), Max: 100.8 (14 Jun 2018 15:00)  HR: 115 (14 Jun 2018 18:46) (114 - 122)  BP: 121/79 (14 Jun 2018 18:46) (121/79 - 130/82)  BP(mean): --  RR: 20 (14 Jun 2018 18:46) (20 - 20)  SpO2: 99% (14 Jun 2018 18:46) (98% - 99%)    PHYSICAL EXAM:  GENERAL: NAD, well-groomed, well-developed  HEAD:  Atraumatic, Normocephalic  EYES: EOMI, PERRLA, conjunctiva and sclera clear  ENMT: No tonsillar erythema, exudates, or enlargement; Moist mucous membranes, Good dentition  NECK: Supple, No JVD  NERVOUS SYSTEM: AOX3, motor and sensation grossly intact in b/l UE and b/l LE  PSYCHIATRIC: Appropriate affect and mood  CHEST/LUNG: Clear to auscultation bilaterally; No rales, rhonchi, wheezing, or rubs  HEART: Regular rate and rhythm; No murmurs, rubs, or gallops. No LE edema  ABDOMEN: Soft, Nontender, Nondistended; Bowel sounds present  EXTREMITIES:  2+ Peripheral Pulses, No clubbing, cyanosis  SKIN: No rashes or lesions Vital Signs Last 24 Hrs  T(C): 37.3 (14 Jun 2018 18:46), Max: 38.2 (14 Jun 2018 15:00)  T(F): 99.2 (14 Jun 2018 18:46), Max: 100.8 (14 Jun 2018 15:00)  HR: 115 (14 Jun 2018 18:46) (114 - 122)  BP: 121/79 (14 Jun 2018 18:46) (121/79 - 130/82)  BP(mean): --  RR: 20 (14 Jun 2018 18:46) (20 - 20)  SpO2: 99% (14 Jun 2018 18:46) (98% - 99%)    PHYSICAL EXAM:  GENERAL: NAD, cachectic   HEAD:  Atraumatic, Normocephalic  EYES: EOMI, PERRLA, conjunctiva and sclera clear  ENMT: No tonsillar erythema, exudates, or enlargement; Moist mucous membranes, Good dentition  NECK: Supple, No JVD  NERVOUS SYSTEM: AOX3, motor and sensation grossly intact in b/l UE and b/l LE  PSYCHIATRIC: Appropriate affect and mood  CHEST/LUNG: +crackles R base. No wheezing, or rubs  HEART: +tachycardic, regular.  Regular rate and rhythm; No murmurs, rubs, or gallops.  ABDOMEN: +RUQ and RLQ tenderness. soft, Nondistended; Bowel sounds present  EXTREMITIES:  2+ Peripheral Pulses, No clubbing, cyanosis  SKIN: No rashes or lesions

## 2018-06-14 NOTE — H&P ADULT - PROBLEM SELECTOR PLAN 7
- - discussed with daughter Yari - patient does not have HCP, daughter states her and her mother make decisions together and that she is primary caregiver. Currently full code, she has not thought about advanced directives, she is hopeful he will get through this possible infection and potentially receive further chemotherapy. - DVT ppx: lovenox - currently not on medication, bp stable

## 2018-06-14 NOTE — ED ADULT NURSE NOTE - OBJECTIVE STATEMENT
67 y/o male presents to ED from [facility] c/o decreased appetite and weakness. Patient's daughter reports patient was recently discharged from [facility] MICU for DKA. Patient's daughter reports patient has Stage IV neuroendocrine cancer, on immunotherapy, with mets to liver. Daughter reports patient has been eating less with increasing weakness since discharge. Patient denies SOB, CP, n/v/d, fever/chills, falls/LOC. Patient A&Ox4, appears thin and weak, breathing spontaneously, airway patent, b/l clear lungs, abdomen nontender, +pulses, cap refill <2 seconds. Patient resting in bed with family at bedside, plan of care explained.

## 2018-06-14 NOTE — H&P ADULT - NSHPLABSRESULTS_GEN_ALL_CORE
LABS:                        10.9   12.9  )-----------( 228      ( 14 Jun 2018 15:19 )             32.9     14 Jun 2018 15:19    130    |  87     |  20     ----------------------------<  256    4.9     |  23     |  0.55     Ca    8.3        14 Jun 2018 15:19    TPro  7.9    /  Alb  2.9    /  TBili  0.9    /  DBili  x      /  AST  200    /  ALT  38     /  AlkPhos  1107   14 Jun 2018 15:19      CAPILLARY BLOOD GLUCOSE      POCT Blood Glucose.: 249 mg/dL (14 Jun 2018 15:27)    BLOOD CULTURE    RADIOLOGY & ADDITIONAL TESTS:    Imaging Personally Reviewed:  [X] YES  CXR w/ new L pleural effusion    Labs w/ 12.9 w/ L shift, hgb 10.9.  Na 132 (corrected for glucose), Cr 0.55.  VBG w/ ph 7.4, lactate 3.9, alk phos 1107, , ALT 38.  CXR w/ new L pleural effusion.  Blood and urine cultures pending.    UA grossly positive w/ + LE, 26-50 WBCs, moderate ketones. +yeast LABS:                        10.9   12.9  )-----------( 228      ( 14 Jun 2018 15:19 )             32.9     14 Jun 2018 15:19    130    |  87     |  20     ----------------------------<  256    4.9     |  23     |  0.55     Ca    8.3        14 Jun 2018 15:19    TPro  7.9    /  Alb  2.9    /  TBili  0.9    /  DBili  x      /  AST  200    /  ALT  38     /  AlkPhos  1107   14 Jun 2018 15:19      CAPILLARY BLOOD GLUCOSE      POCT Blood Glucose.: 249 mg/dL (14 Jun 2018 15:27)    BLOOD CULTURE    RADIOLOGY & ADDITIONAL TESTS:    Imaging Personally Reviewed:  [X] YES  CXR w/ new L pleural effusion    Labs w/ 12.9 w/ L shift, hgb 10.9.  Na 132 (corrected for glucose), Cr 0.55.  VBG w/ ph 7.4, lactate 3.9, alk phos 1107, , ALT 38.  CXR w/ new L pleural effusion.  Blood and urine cultures pending.    UA grossly positive w/ + LE, 26-50 WBCs, moderate ketones. +yeast    EKG personally reviewed - sinus 115, TWI II, III, AVF (new),  LABS:                        10.9   12.9  )-----------( 228      ( 14 Jun 2018 15:19 )             32.9     14 Jun 2018 15:19    130    |  87     |  20     ----------------------------<  256    4.9     |  23     |  0.55     Ca    8.3        14 Jun 2018 15:19    TPro  7.9    /  Alb  2.9    /  TBili  0.9    /  DBili  x      /  AST  200    /  ALT  38     /  AlkPhos  1107   14 Jun 2018 15:19      CAPILLARY BLOOD GLUCOSE      POCT Blood Glucose.: 249 mg/dL (14 Jun 2018 15:27)    BLOOD CULTURE    RADIOLOGY & ADDITIONAL TESTS:    Imaging Personally Reviewed:  [X] YES  CXR w/ new L pleural effusion/PNA     Labs w/ 12.9 w/ L shift, hgb 10.9.  Na 132 (corrected for glucose), Cr 0.55.  VBG w/ ph 7.4, lactate 3.9, alk phos 1107, , ALT 38.  CXR w/ new L pleural effusion.  Blood and urine cultures pending.    UA grossly positive w/ + LE, 26-50 WBCs, moderate ketones. +yeast    EKG personally reviewed - sinus 115, TWI II, III, AVF (new),

## 2018-06-14 NOTE — H&P ADULT - PROBLEM SELECTOR PLAN 5
- currently not on medication, bp stable - home regimen: Lantus 8 mg qhs, prandin 0.5 mg TID   - patient did not take lantus this AM, has +ketones in urine, , will give 8 units of NPH now w/ ISS, will resume am lantus at 6 units  - ph 7.4, bicarb 23, unlikely recurrent DKA  - consistent carb diet - stage IV neuroendocrine cancer w/ pulmonary mets, mediastinal / hilar lymphadenopathy, hepatic mets, bone mets  - will consult oncology regarding further plan of care - per [employer] note if patient has progression of disease will likely not recommend further treatment and would recommend palliative consult - stage IV neuroendocrine cancer w/ pulmonary mets, mediastinal / hilar lymphadenopathy, hepatic mets, bone mets  - will consult oncology regarding further plan of care - per [employer] note if patient has progression of disease will likely not recommend further treatment and would recommend palliative consult  - will discuss with onc need for repeat CT scans for staging to determine if POD.

## 2018-06-14 NOTE — H&P ADULT - PROBLEM SELECTOR PLAN 3
- stage IV neuroendocrine cancer w/ pulmonary mets, mediastinal / hilar lymphadenopathy, hepatic mets, bone mets  - will consult oncology regarding further plan of care - per [employer] note if patient has progression of disease will likely not recommend further treatment and would recommend palliative consult - sinus tach 115, per chart review patient w/ chronic tachycardia   - likely 2/2 infection as well as metastatic cancer and bone pain   - will give IVF  - EKG w/ new TWI in II, III, avF - will add troponin, patient w/o chest pain - pt w/ L base opacity on CXR   - reports dry cough since discharge   - check RVP, c/w cefepime   - if decompensates / persistent fevers will add vanc, currently non-toxic appearing - + UA, will f/u urine culture (no prior urine cultures)  - c/w cefepime for now to cover for PNA / UTI

## 2018-06-14 NOTE — H&P ADULT - HISTORY OF PRESENT ILLNESS
68M PMH metastatic neuroendocrine carcinoma (unknown primary, diagnosed via iliac bone biopsy, w/ pulmonary mets, mediastinal/hilar lymphadenopathy, hepatic mets, diffuse bone mets, s/p chemo/RT w/ progression of disease), DM2 (recent admission for DKA), HTN presents from [facility] with generalized weakness.  Patient had recent admission 6/6- 6/12 for DKA, was on Lantus and Invokana.  Invokana was d/c'd, and she was discharged on Lantus and Prandin per endocrine.  Patient was also treated for a UTI w/ ceftriaxone.  He was at [facility] today and family c/o decreased appetite, not eating or drinking, severe abdominal / back pain, unable to ambulate without assitance, and increased confusion.    In the ED .8 122 130/82 20 98% RA  Labs w/ 12.9 w/ L shift, hgb 10.9.  Na 132 (corrected for glucose), Cr 0.55.  VBG w/ ph 7.4, lactate 3.9, alk phos 1107, , ALT 38.  CXR w/ new L pleural effusion.  Blood and urine cultures pending.    UA grossly positive w/ + LE, 26-50 WBCs, moderate ketones. +yeast   In the ED given 68M PMH metastatic neuroendocrine carcinoma (unknown primary, diagnosed via iliac bone biopsy, w/ pulmonary mets, mediastinal/hilar lymphadenopathy, hepatic mets, diffuse bone mets, s/p chemo/RT w/ progression of disease), DM2 (recent admission for DKA), HTN presents from [facility] with generalized weakness.  Patient had recent admission 6/6- 6/12 for DKA, was on Lantus and Invokana.  Invokana was d/c'd, and he was discharged on Lantus and Prandin per endocrine.  Patient was also treated for a UTI w/ ceftriaxone.  He was at [facility] today and daughter c/o decreased appetite, abdominal pain.  Daughter reports he has been intermittently confused but that today he is less confused.  She reports dry cough for the past 2 days since leaving the hospital.  Denies wheezing, fevers, chills, shortness of breath, diarrhea, nausea, vomiting.  He did not take his lantus this am because he was going to the doctor, usually takes lantus 8 units, prandin 0.5 mg TID.  She denies hypoglycemia, has been checking his blood sugars and they have been 90s-100s.    In the ED .8 122 130/82 20 98% RA  Labs w/ 12.9 w/ L shift, hgb 10.9.  Na 132 (corrected for glucose), Cr 0.55.  VBG w/ ph 7.4, lactate 3.9, alk phos 1107, , ALT 38.  CXR w/ new L pleural effusion.  Blood and urine cultures pending.    UA grossly positive w/ + LE, 26-50 WBCs, moderate ketones. +yeast   In the ED given cefepime 2L IVF, morphine 2 mg 68M PMH metastatic neuroendocrine carcinoma (unknown primary, diagnosed via iliac bone biopsy, w/ pulmonary mets, mediastinal/hilar lymphadenopathy, hepatic mets, diffuse bone mets, s/p chemo/RT w/ progression of disease), DM2 (recent admission for DKA), HTN presents from [facility] with generalized weakness.  Patient had recent admission 6/6- 6/12 for DKA, was on Lantus and Invokana.  Invokana was d/c'd, and he was discharged on Lantus and Prandin per endocrine.  Patient was also treated for a UTI w/ ceftriaxone.  He was at [facility] today and daughter c/o decreased appetite, abdominal pain.  Daughter reports he has been intermittently confused but that today he is less confused.  She reports dry cough for the past 2 days since leaving the hospital.  Denies wheezing, fevers, chills, shortness of breath, diarrhea, nausea, vomiting.  He did not take his lantus this am because he was going to the doctor, usually takes lantus 8 units, prandin 0.5 mg TID.  She denies hypoglycemia, has been checking his blood sugars and they have been 90s-100s.    In regards to his neuroendocrine tumor, he was diagnosed by bone biopsy November 2017, was treated with carboplatin/etoposide until March 2018 when he was discovered to have POD.  He is planning on starting immunotherapy.       In the ED .8 122 130/82 20 98% RA  Labs w/ 12.9 w/ L shift, hgb 10.9.  Na 132 (corrected for glucose), Cr 0.55.  VBG w/ ph 7.4, lactate 3.9, alk phos 1107, , ALT 38.  CXR w/ new L pleural effusion.  Blood and urine cultures pending.    UA grossly positive w/ + LE, 26-50 WBCs, moderate ketones. +yeast   In the ED given cefepime 2L IVF, morphine 2 mg 68M PMH metastatic neuroendocrine carcinoma (unknown primary, diagnosed via iliac bone biopsy, w/ pulmonary mets, mediastinal/hilar lymphadenopathy, hepatic mets, diffuse bone mets, s/p chemo/RT w/ progression of disease), DM2 (recent admission for DKA), HTN presents from [facility] with generalized weakness.  Patient had recent admission 6/6- 6/12 for DKA, was on Lantus and Invokana.  Invokana was d/c'd, and he was discharged on Lantus and Prandin per endocrine.  Patient was also treated for a UTI w/ ceftriaxone.  He was at [facility] today and daughter c/o decreased appetite, abdominal pain.  Daughter reports he has been intermittently confused but that today he is less confused.  She reports dry cough for the past 2 days since leaving the hospital.  Denies wheezing, fevers, chills, shortness of breath, diarrhea, nausea, vomiting.  He did not take his lantus this am because he was going to the doctor, usually takes lantus 8 units, prandin 0.5 mg TID.  She denies hypoglycemia, has been checking his blood sugars and they have been 90s-100s.    In regards to his neuroendocrine tumor, he was diagnosed by bone biopsy November 2017, was treated with carboplatin/etoposide until March 2018 when he was discovered to have POD.  He is planning on starting immunotherapy.       In the ED .8 122 130/82 20 98% RA  Labs w/ 12.9 w/ L shift, hgb 10.9.  Na 132 (corrected for glucose), Cr 0.55.  VBG w/ ph 7.4, lactate 3.9, alk phos 1107, , ALT 38.  CXR w/ new L pleural effusion.  Blood and urine cultures pending.    UA grossly positive w/ + LE, 26-50 WBCs, moderate ketones. +yeast   In the ED given cefepime 2L IVF, morphine 2 mg 68M PMH metastatic neuroendocrine carcinoma (unknown primary, diagnosed via iliac bone biopsy, w/ pulmonary mets, mediastinal/hilar lymphadenopathy, hepatic mets, diffuse bone mets, s/p chemo/RT w/ progression of disease), DM2 (recent admission for DKA), HTN presents from [facility] with generalized weakness.  Patient had recent admission 6/6- 6/12 for DKA, was on Lantus and Invokana.  Invokana was d/c'd, and he was discharged on Lantus and Prandin per endocrine.  Patient was also treated for a UTI w/ ceftriaxone.  He was at [facility] today and daughter c/o decreased appetite, abdominal pain.  Daughter reports he has been intermittently confused but that today he is less confused.  She reports dry cough for the past 2 days since leaving the hospital.  Denies wheezing, fevers, chills, shortness of breath, diarrhea, nausea, vomiting.  He did not take his lantus this am because he was going to the doctor, usually takes lantus 8 units, prandin 0.5 mg TID.  She denies hypoglycemia, has been checking his blood sugars and they have been 90s-100s, had one reading of 600 yesterday after eating a sandwitch so gave extra lantus.      In regards to his neuroendocrine tumor, he was diagnosed by bone biopsy November 2017, was treated with carboplatin/etoposide until March 2018 when he was discovered to have POD.  He is planning on starting immunotherapy.       In the ED .8 122 130/82 20 98% RA  Labs w/ 12.9 w/ L shift, hgb 10.9.  Na 132 (corrected for glucose), Cr 0.55.  VBG w/ ph 7.4, lactate 3.9, alk phos 1107, , ALT 38.  CXR w/ new L pleural effusion.  Blood and urine cultures pending.    UA grossly positive w/ + LE, 26-50 WBCs, moderate ketones. +yeast   In the ED given cefepime 2L IVF, morphine 2 mg

## 2018-06-14 NOTE — H&P ADULT - PROBLEM SELECTOR PLAN 4
- home regimen: Lantus 8 mg qhs, prandin 0.5 mg TID   - c/w lantus 8 mg qhs, ISS   - ph 7.4, bicarb 23, unlikely recurrent DKA - home regimen: Lantus 8 mg qhs, prandin 0.5 mg TID   - patient did not take lantus this AM, has +ketones in urine, , will give 8 units of NPH now w/ ISS, will resume am lantus at 6 units  - ph 7.4, bicarb 23, unlikely recurrent DKA  - consistent carb diet - stage IV neuroendocrine cancer w/ pulmonary mets, mediastinal / hilar lymphadenopathy, hepatic mets, bone mets  - will consult oncology regarding further plan of care - per [employer] note if patient has progression of disease will likely not recommend further treatment and would recommend palliative consult - sinus tach 115, per chart review patient w/ chronic tachycardia   - likely 2/2 infection as well as metastatic cancer and bone pain   - will give IVF  - EKG w/ new TWI in II, III, avF - will add troponin, patient w/o chest pain - sinus tach 115, per chart review patient w/ chronic tachycardia   - likely 2/2 infection as well as metastatic cancer and bone pain   - will give IVF  - EKG w/ new TWI in II, III, avF likely 2/2 tachycardia

## 2018-06-14 NOTE — H&P ADULT - ASSESSMENT
68M PMH metastatic neuroendocrine carcinoma (unknown primary, diagnosed via iliac bone biopsy, w/ pulmonary mets, mediastinal/hilar lymphadenopathy, hepatic mets, diffuse bone mets, s/p chemo/RT w/ progression of disease), DM2 (recent admission for DKA), HTN presents from [facility] with generalized weakness likely 2/2 UTI. 68M PMH metastatic neuroendocrine carcinoma (unknown primary, diagnosed via iliac bone biopsy, w/ pulmonary mets, mediastinal/hilar lymphadenopathy, hepatic mets, diffuse bone mets, s/p chemo/RT w/ progression of disease), DM2 (recent admission for DKA), HTN presents from [facility] with generalized weakness found to have sepsis 2/2 UTI vs PNA. 68M PMH metastatic neuroendocrine carcinoma (unknown primary, diagnosed via iliac bone biopsy, w/ pulmonary mets, mediastinal/hilar lymphadenopathy, hepatic mets, diffuse bone mets, s/p chemo/RT w/ progression of disease), DM2 (recent admission for DKA), HTN presents from [facility] with generalized weakness found to have sepsis 2/2 PNA vs. UTI.

## 2018-06-14 NOTE — H&P ADULT - PROBLEM SELECTOR PLAN 1
- patient meets sepsis criteria w/ fevers, leukocytosis, tachycardia, tachypnea   - likely 2/2 UTI given +UA   - f/u blood and urine cultures  - lactate 3.9, will trend after IVF   - c/w cefepime - patient meets sepsis criteria w/ fevers, leukocytosis, tachycardia, tachypnea   - likely 2/2 UTI given +UA  vs. PNA on CXR   - f/u blood and urine cultures  - lactate 3.9, will trend after IVF   - c/w cefepime for now for possible UTI vs. PNA - history not consistent w/ MRSA PNA so will hold off on vanc but if patient decompensates or has persistent fevers will add vanc - patient meets sepsis criteria w/ fevers, leukocytosis, tachycardia, tachypnea   - likely 2/2 UTI given +UA  vs. PNA on CXR   - f/u blood and urine cultures  - lactate 3.9, will trend after IVF   - c/w cefepime for now for possible UTI vs. PNA - history not consistent w/ MRSA PNA so will hold off on vanc but if patient decompensates or has persistent fevers will add vanc.  Patient w/o port, no signs of skin infection.  - repeat lactate in AM - patient meets sepsis criteria w/ fevers, leukocytosis, tachycardia, tachypnea   - likely 2/2 PNA vs. UTI   - f/u blood and urine cultures  - lactate 3.9, will trend after IVF   - will treat with cefepime / vanc for hospital acquired PNA as patient recently hospitalized, discharged 2 days ago   - will give additional IVF and repeat lactate.

## 2018-06-14 NOTE — H&P ADULT - NSHPREVIEWOFSYSTEMS_GEN_ALL_CORE
CONSTITUTIONAL: +fever, +generalized weakness   EYES: No eye pain, visual disturbances, or discharge  ENMT:  No difficulty hearing, tinnitus, vertigo; No sinus or throat pain  RESPIRATORY: No cough, wheezing, chills or hemoptysis; No shortness of breath  CARDIOVASCULAR: No chest pain, palpitations, dizziness, or leg swelling  GASTROINTESTINAL: +decreased appetite. No abdominal or epigastric pain. No nausea, vomiting, or hematemesis; No diarrhea or constipation. No melena or hematochezia.  GENITOURINARY: No dysuria, frequency, hematuria, or incontinence  NEUROLOGICAL: No headaches, loss of strength, numbness, or tremors  SKIN: No itching, burning, rashes, or lesions   LYMPH NODES: No enlarged glands  ENDOCRINE: No heat or cold intolerance; No polydipsia or polyuria  MUSCULOSKELETAL: No joint pain or swelling; CONSTITUTIONAL: +fever, +generalized weakness   EYES: No eye pain, visual disturbances, or discharge  ENMT:  No difficulty hearing, tinnitus, vertigo; No sinus or throat pain  RESPIRATORY: +cough.  No wheezing, chills or hemoptysis; No shortness of breath  CARDIOVASCULAR: No chest pain, palpitations, dizziness, or leg swelling  GASTROINTESTINAL: +decreased appetite. +RUQ pain.  No nausea, vomiting, or hematemesis; No diarrhea or constipation. No melena or hematochezia.  GENITOURINARY: No dysuria, frequency, hematuria, or incontinence  NEUROLOGICAL: No headaches, loss of strength, numbness, or tremors  SKIN: No itching, burning, rashes, or lesions   LYMPH NODES: No enlarged glands

## 2018-06-14 NOTE — H&P ADULT - PROBLEM SELECTOR PLAN 8
- discussed with daughter Yari - patient does not have HCP, daughter states her and her mother make decisions together and that she is primary caregiver. Currently full code, she has not thought about advanced directives, she is hopeful he will get through this possible infection and potentially receive further chemotherapy. - DVT ppx: lovenox

## 2018-06-14 NOTE — PROGRESS NOTE ADULT - SUBJECTIVE AND OBJECTIVE BOX
called by ED staff to evaluate penis for discharge.  pt uncircumcised.  some mild balanoposthitis.  excessive smegma- cleared.  would rec some topical steroid cream to foreskin bid

## 2018-06-14 NOTE — ED PROVIDER NOTE - OBJECTIVE STATEMENT
68 year old male with pmhx of of Hypertension, type 2 diabetes on Lantus, hyperlipidemia , anxiety and stage IV neuroendocrine cancer (on immunotherapy) presents with decreased appetite and weakness. Patient was seen at Dr. Yari Dick and was told to come to ED for further evaluation. Denies fever or chills. Denies vomiting or nausea or new pain. No difficulty breathing. Patient was discharged recently from DKA at [facility] on Tuesday 6/12/18.  Onc- Yari Dick

## 2018-06-14 NOTE — ED PROVIDER NOTE - PROGRESS NOTE DETAILS
ATTG: : I spoke with Dr. Dick and discussed case. patient with met ca likely liver prim with METS to bone/ lung. fam and patient aware. worsening today in office. here today with weakness and ftt. discsued with fam prognosis. ATTG: : UTI noted on labs. admit to hosp for further care.

## 2018-06-14 NOTE — ED PROVIDER NOTE - MEDICAL DECISION MAKING DETAILS
68 year old male with underlying significant malignancy know OP diagnosis. Likely cause for failure to thrive. Plan: fluids, labs, possible antibiotics for fever, admission to hospital for further care.

## 2018-06-14 NOTE — H&P ADULT - PROBLEM SELECTOR PLAN 6
- DVT ppx: lovenox - currently not on medication, bp stable - home regimen: Lantus 8 mg qhs, prandin 0.5 mg TID   - patient did not take lantus this AM, has +ketones in urine, , will give 8 units of NPH now w/ ISS, will resume am lantus at 6 units  - ph 7.4, bicarb 23, unlikely recurrent DKA  - consistent carb diet - home regimen: Lantus 8 mg qhs, prandin 0.5 mg TID   - patient did not take lantus this AM, has +ketones in urine, , will give 8 units of NPH now w/ ISS, will resume am lantus at 6 units  - ph 7.4, bicarb 23, unlikely recurrent DKA, will check BHB   - consistent carb diet

## 2018-06-14 NOTE — H&P ADULT - NSHPSOCIALHISTORY_GEN_ALL_CORE
lives with daughter, ambulates independently, no etoh use, no drug use.  used medical marijuana but stopped because made him drowsy.

## 2018-06-15 ENCOUNTER — APPOINTMENT (OUTPATIENT)
Age: 68
End: 2018-06-15

## 2018-06-15 DIAGNOSIS — E13.10 OTHER SPECIFIED DIABETES MELLITUS WITH KETOACIDOSIS WITHOUT COMA: ICD-10-CM

## 2018-06-15 DIAGNOSIS — G89.3 NEOPLASM RELATED PAIN (ACUTE) (CHRONIC): ICD-10-CM

## 2018-06-15 DIAGNOSIS — R62.7 ADULT FAILURE TO THRIVE: ICD-10-CM

## 2018-06-15 DIAGNOSIS — Z51.5 ENCOUNTER FOR PALLIATIVE CARE: ICD-10-CM

## 2018-06-15 DIAGNOSIS — E11.65 TYPE 2 DIABETES MELLITUS WITH HYPERGLYCEMIA: ICD-10-CM

## 2018-06-15 DIAGNOSIS — R41.82 ALTERED MENTAL STATUS, UNSPECIFIED: ICD-10-CM

## 2018-06-15 LAB
ANION GAP SERPL CALC-SCNC: 13 MMOL/L — SIGNIFICANT CHANGE UP (ref 5–17)
ANION GAP SERPL CALC-SCNC: 13 MMOL/L — SIGNIFICANT CHANGE UP (ref 5–17)
BUN SERPL-MCNC: 10 MG/DL — SIGNIFICANT CHANGE UP (ref 7–23)
BUN SERPL-MCNC: 12 MG/DL — SIGNIFICANT CHANGE UP (ref 7–23)
CALCIUM SERPL-MCNC: 8.2 MG/DL — LOW (ref 8.4–10.5)
CALCIUM SERPL-MCNC: 8.6 MG/DL — SIGNIFICANT CHANGE UP (ref 8.4–10.5)
CHLORIDE SERPL-SCNC: 95 MMOL/L — LOW (ref 96–108)
CHLORIDE SERPL-SCNC: 96 MMOL/L — SIGNIFICANT CHANGE UP (ref 96–108)
CK MB CFR SERPL CALC: 1 NG/ML — SIGNIFICANT CHANGE UP (ref 0–6.7)
CK SERPL-CCNC: 45 U/L — SIGNIFICANT CHANGE UP (ref 30–200)
CO2 SERPL-SCNC: 22 MMOL/L — SIGNIFICANT CHANGE UP (ref 22–31)
CO2 SERPL-SCNC: 25 MMOL/L — SIGNIFICANT CHANGE UP (ref 22–31)
CREAT SERPL-MCNC: 0.39 MG/DL — LOW (ref 0.5–1.3)
CREAT SERPL-MCNC: 0.44 MG/DL — LOW (ref 0.5–1.3)
CULTURE RESULTS: SIGNIFICANT CHANGE UP
GLUCOSE BLDC GLUCOMTR-MCNC: 203 MG/DL — HIGH (ref 70–99)
GLUCOSE BLDC GLUCOMTR-MCNC: 253 MG/DL — HIGH (ref 70–99)
GLUCOSE BLDC GLUCOMTR-MCNC: 257 MG/DL — HIGH (ref 70–99)
GLUCOSE BLDC GLUCOMTR-MCNC: 76 MG/DL — SIGNIFICANT CHANGE UP (ref 70–99)
GLUCOSE BLDC GLUCOMTR-MCNC: 86 MG/DL — SIGNIFICANT CHANGE UP (ref 70–99)
GLUCOSE BLDC GLUCOMTR-MCNC: 94 MG/DL — SIGNIFICANT CHANGE UP (ref 70–99)
GLUCOSE SERPL-MCNC: 110 MG/DL — HIGH (ref 70–99)
GLUCOSE SERPL-MCNC: 113 MG/DL — HIGH (ref 70–99)
HCT VFR BLD CALC: 29.9 % — LOW (ref 39–50)
HGB BLD-MCNC: 9.6 G/DL — LOW (ref 13–17)
LACTATE SERPL-SCNC: 1.2 MMOL/L — SIGNIFICANT CHANGE UP (ref 0.7–2)
MAGNESIUM SERPL-MCNC: 1.6 MG/DL — SIGNIFICANT CHANGE UP (ref 1.6–2.6)
MCHC RBC-ENTMCNC: 27.4 PG — SIGNIFICANT CHANGE UP (ref 27–34)
MCHC RBC-ENTMCNC: 32.2 GM/DL — SIGNIFICANT CHANGE UP (ref 32–36)
MCV RBC AUTO: 85 FL — SIGNIFICANT CHANGE UP (ref 80–100)
OSMOLALITY UR: 572 MOS/KG — SIGNIFICANT CHANGE UP (ref 300–900)
PHOSPHATE SERPL-MCNC: 2.2 MG/DL — LOW (ref 2.5–4.5)
PLATELET # BLD AUTO: 180 K/UL — SIGNIFICANT CHANGE UP (ref 150–400)
POTASSIUM SERPL-MCNC: 3.4 MMOL/L — LOW (ref 3.5–5.3)
POTASSIUM SERPL-MCNC: 3.6 MMOL/L — SIGNIFICANT CHANGE UP (ref 3.5–5.3)
POTASSIUM SERPL-SCNC: 3.4 MMOL/L — LOW (ref 3.5–5.3)
POTASSIUM SERPL-SCNC: 3.6 MMOL/L — SIGNIFICANT CHANGE UP (ref 3.5–5.3)
RBC # BLD: 3.51 M/UL — LOW (ref 4.2–5.8)
RBC # FLD: 16.7 % — HIGH (ref 10.3–14.5)
SODIUM SERPL-SCNC: 131 MMOL/L — LOW (ref 135–145)
SODIUM SERPL-SCNC: 133 MMOL/L — LOW (ref 135–145)
SODIUM UR-SCNC: 88 MMOL/L — SIGNIFICANT CHANGE UP
SPECIMEN SOURCE: SIGNIFICANT CHANGE UP
TROPONIN T, HIGH SENSITIVITY RESULT: 7 NG/L — SIGNIFICANT CHANGE UP (ref 0–51)
WBC # BLD: 11 K/UL — HIGH (ref 3.8–10.5)
WBC # FLD AUTO: 11 K/UL — HIGH (ref 3.8–10.5)

## 2018-06-15 PROCEDURE — 99223 1ST HOSP IP/OBS HIGH 75: CPT

## 2018-06-15 PROCEDURE — 99233 SBSQ HOSP IP/OBS HIGH 50: CPT | Mod: GC

## 2018-06-15 PROCEDURE — 99222 1ST HOSP IP/OBS MODERATE 55: CPT

## 2018-06-15 RX ORDER — POTASSIUM CHLORIDE 20 MEQ
40 PACKET (EA) ORAL ONCE
Qty: 0 | Refills: 0 | Status: COMPLETED | OUTPATIENT
Start: 2018-06-15 | End: 2018-06-15

## 2018-06-15 RX ORDER — INSULIN LISPRO 100/ML
2 VIAL (ML) SUBCUTANEOUS
Qty: 0 | Refills: 0 | Status: DISCONTINUED | OUTPATIENT
Start: 2018-06-15 | End: 2018-06-16

## 2018-06-15 RX ORDER — HYDROMORPHONE HYDROCHLORIDE 2 MG/ML
0.2 INJECTION INTRAMUSCULAR; INTRAVENOUS; SUBCUTANEOUS EVERY 4 HOURS
Qty: 0 | Refills: 0 | Status: DISCONTINUED | OUTPATIENT
Start: 2018-06-15 | End: 2018-06-17

## 2018-06-15 RX ORDER — ASCORBIC ACID 60 MG
500 TABLET,CHEWABLE ORAL DAILY
Qty: 0 | Refills: 0 | Status: DISCONTINUED | OUTPATIENT
Start: 2018-06-15 | End: 2018-06-22

## 2018-06-15 RX ORDER — HYDROMORPHONE HYDROCHLORIDE 2 MG/ML
0.5 INJECTION INTRAMUSCULAR; INTRAVENOUS; SUBCUTANEOUS EVERY 4 HOURS
Qty: 0 | Refills: 0 | Status: DISCONTINUED | OUTPATIENT
Start: 2018-06-15 | End: 2018-06-15

## 2018-06-15 RX ORDER — HYDROMORPHONE HYDROCHLORIDE 2 MG/ML
0.2 INJECTION INTRAMUSCULAR; INTRAVENOUS; SUBCUTANEOUS EVERY 4 HOURS
Qty: 0 | Refills: 0 | Status: DISCONTINUED | OUTPATIENT
Start: 2018-06-15 | End: 2018-06-15

## 2018-06-15 RX ORDER — HYDROMORPHONE HYDROCHLORIDE 2 MG/ML
0.2 INJECTION INTRAMUSCULAR; INTRAVENOUS; SUBCUTANEOUS
Qty: 0 | Refills: 0 | Status: DISCONTINUED | OUTPATIENT
Start: 2018-06-15 | End: 2018-06-17

## 2018-06-15 RX ORDER — DEXTROSE 50 % IN WATER 50 %
25 SYRINGE (ML) INTRAVENOUS ONCE
Qty: 0 | Refills: 0 | Status: COMPLETED | OUTPATIENT
Start: 2018-06-15 | End: 2018-06-15

## 2018-06-15 RX ORDER — NALOXONE HYDROCHLORIDE 4 MG/.1ML
0.1 SPRAY NASAL
Qty: 0 | Refills: 0 | Status: DISCONTINUED | OUTPATIENT
Start: 2018-06-15 | End: 2018-06-22

## 2018-06-15 RX ORDER — POTASSIUM PHOSPHATE, MONOBASIC POTASSIUM PHOSPHATE, DIBASIC 236; 224 MG/ML; MG/ML
15 INJECTION, SOLUTION INTRAVENOUS ONCE
Qty: 0 | Refills: 0 | Status: COMPLETED | OUTPATIENT
Start: 2018-06-15 | End: 2018-06-15

## 2018-06-15 RX ORDER — HYDROMORPHONE HYDROCHLORIDE 2 MG/ML
1 INJECTION INTRAMUSCULAR; INTRAVENOUS; SUBCUTANEOUS EVERY 4 HOURS
Qty: 0 | Refills: 0 | Status: DISCONTINUED | OUTPATIENT
Start: 2018-06-15 | End: 2018-06-15

## 2018-06-15 RX ADMIN — Medication 1: at 22:34

## 2018-06-15 RX ADMIN — INSULIN GLARGINE 6 UNIT(S): 100 INJECTION, SOLUTION SUBCUTANEOUS at 09:17

## 2018-06-15 RX ADMIN — Medication 25 MILLILITER(S): at 22:15

## 2018-06-15 RX ADMIN — Medication 40 MILLIEQUIVALENT(S): at 09:23

## 2018-06-15 RX ADMIN — CEFEPIME 100 MILLIGRAM(S): 1 INJECTION, POWDER, FOR SOLUTION INTRAMUSCULAR; INTRAVENOUS at 13:13

## 2018-06-15 RX ADMIN — Medication 2: at 13:13

## 2018-06-15 RX ADMIN — Medication 250 MILLIGRAM(S): at 00:01

## 2018-06-15 RX ADMIN — Medication 100 MILLIGRAM(S): at 22:35

## 2018-06-15 RX ADMIN — Medication 100 MILLIGRAM(S): at 05:39

## 2018-06-15 RX ADMIN — Medication 3: at 18:17

## 2018-06-15 RX ADMIN — Medication 2 UNIT(S): at 18:16

## 2018-06-15 RX ADMIN — CEFEPIME 100 MILLIGRAM(S): 1 INJECTION, POWDER, FOR SOLUTION INTRAMUSCULAR; INTRAVENOUS at 22:33

## 2018-06-15 RX ADMIN — HYDROMORPHONE HYDROCHLORIDE 0.2 MILLIGRAM(S): 2 INJECTION INTRAMUSCULAR; INTRAVENOUS; SUBCUTANEOUS at 18:51

## 2018-06-15 RX ADMIN — HYDROMORPHONE HYDROCHLORIDE 0.2 MILLIGRAM(S): 2 INJECTION INTRAMUSCULAR; INTRAVENOUS; SUBCUTANEOUS at 18:21

## 2018-06-15 RX ADMIN — Medication 100 MILLIGRAM(S): at 13:13

## 2018-06-15 RX ADMIN — Medication 1 TABLET(S): at 18:22

## 2018-06-15 RX ADMIN — OXYCODONE HYDROCHLORIDE 5 MILLIGRAM(S): 5 TABLET ORAL at 11:45

## 2018-06-15 RX ADMIN — MORPHINE SULFATE 15 MILLIGRAM(S): 50 CAPSULE, EXTENDED RELEASE ORAL at 09:22

## 2018-06-15 RX ADMIN — POTASSIUM PHOSPHATE, MONOBASIC POTASSIUM PHOSPHATE, DIBASIC 62.5 MILLIMOLE(S): 236; 224 INJECTION, SOLUTION INTRAVENOUS at 18:24

## 2018-06-15 RX ADMIN — Medication 500 MILLIGRAM(S): at 18:21

## 2018-06-15 RX ADMIN — SENNA PLUS 2 TABLET(S): 8.6 TABLET ORAL at 22:35

## 2018-06-15 RX ADMIN — POLYETHYLENE GLYCOL 3350 17 GRAM(S): 17 POWDER, FOR SOLUTION ORAL at 13:13

## 2018-06-15 RX ADMIN — ENOXAPARIN SODIUM 40 MILLIGRAM(S): 100 INJECTION SUBCUTANEOUS at 13:12

## 2018-06-15 RX ADMIN — Medication 250 MILLIGRAM(S): at 13:13

## 2018-06-15 RX ADMIN — CEFEPIME 100 MILLIGRAM(S): 1 INJECTION, POWDER, FOR SOLUTION INTRAMUSCULAR; INTRAVENOUS at 05:39

## 2018-06-15 RX ADMIN — SODIUM CHLORIDE 150 MILLILITER(S): 9 INJECTION INTRAMUSCULAR; INTRAVENOUS; SUBCUTANEOUS at 02:32

## 2018-06-15 NOTE — PROGRESS NOTE ADULT - PROBLEM SELECTOR PLAN 2
- pt w/ L base opacity on CXR   - f/u chest CT as above  - check RVP, c/w cefepime/vanc for hospital acquired PNA

## 2018-06-15 NOTE — PROGRESS NOTE ADULT - PROBLEM SELECTOR PLAN 1
- patient meets sepsis criteria w/ fevers, leukocytosis, tachycardia, tachypnea   - likely 2/2 PNA vs. UTI   - f/u blood and urine cultures  - CT chest non-con entered, f/u results  - will treat with cefepime / vanc for hospital acquired PNA as patient recently hospitalized, discharged 2 days ago

## 2018-06-15 NOTE — PROGRESS NOTE ADULT - PROBLEM SELECTOR PLAN 4
- sinus tach 115, per chart review patient w/ chronic tachycardia   - likely 2/2 infection as well as metastatic cancer and bone pain   - EKG w/ new TWI in II, III, avF likely 2/2 tachycardia  - CE negative, likely in setting of demand ischemia

## 2018-06-15 NOTE — CHART NOTE - NSCHARTNOTEFT_GEN_A_CORE
Asked by day team to f/u the 6 PM BMP for AG - AG remains steady at 13; will not start LR or draw BHB. Asked by day team to f/u the 6 PM BMP for AG - AG remains steady at 13; will not start LR or draw BHB.    Called by night nurse - FSG is 76 - will repeat FSG and then give D50 Asked by day team to f/u the 6 PM BMP for AG - AG remains steady at 13; will not start LR or draw BHB.    Called by night nurse - FSG is 76 - will repeat FSG and then give 25 ml D50 Asked by day team to f/u the 6 PM BMP for AG - AG remains steady at 13; will not start LR or draw BHB.    Called by night nurse - FSG is 76 - will repeat FSG and then give 25 ml D50 - FSG went to 250, likely because pt was too altered to eat, which would have been better than D50; asked the nurse to recheck a FSG after 1 U of insulin per ISS and it came down to 187

## 2018-06-15 NOTE — DIETITIAN INITIAL EVALUATION ADULT. - PERTINENT LABORATORY DATA
Na 131 [135 - 145], K+ 3.4 [3.5 - 5.3], BUN 12 [7 - 23], Cr 0.39 [0.50 - 1.30],  [70 - 99], Phos 2.2 [2.5 - 4.5], Mg 1.6 [1.6 - 2.6], Ca 8.2 [8.4 - 10.5], HbA1c 8.2%; Fingersticks (6/14-15)

## 2018-06-15 NOTE — DIETITIAN INITIAL EVALUATION ADULT. - ENERGY NEEDS
Height: 67 inches (per previous RD note - visually matches better than current 62 inches),   Weight: 114 pounds, BMI: 17.8 kg/m2 IBW: 148 pounds (+/-10%), %IBW: 77%  Pertinent Info: Per chart, 69 y/o male with metastatic neuroendocrine carcinoma (unknown primary, diagnosed via iliac bone biopsy, w/ pulmonary mets, mediastinal/hilar lymphadenopathy, hepatic mets, diffuse bone mets, s/p chemo/RT w/ progression of disease), DM2 (recent admission for DKA), HTN admitted with generalized weakness and sepsis 2/2 UTI vs PNA. No edema, stage 2 sacrum pressure ulcer noted at this time.

## 2018-06-15 NOTE — PROGRESS NOTE ADULT - ASSESSMENT
68M PMH metastatic neuroendocrine carcinoma (unknown primary, diagnosed via iliac bone biopsy, w/ pulmonary mets, mediastinal/hilar lymphadenopathy, hepatic mets, diffuse bone mets, s/p chemo/RT w/ progression of disease), DM2 (recent admission for DKA), HTN presents from [facility] with generalized weakness found to have sepsis 2/2 PNA vs. UTI.

## 2018-06-15 NOTE — CONSULT NOTE ADULT - ASSESSMENT
68M PMH metastatic neuroendocrine tumor with recent imaging showing progression of disease who presents with failure to thrive

## 2018-06-15 NOTE — CONSULT NOTE ADULT - ASSESSMENT
A/P:68M PMH metastatic neuroendocrine carcinoma (unknown primary, diagnosed via iliac bone biopsy, w/ pulmonary mets, mediastinal/hilar lymphadenopathy, hepatic mets, diffuse bone mets, s/p chemo/RT w/ progression of disease), DM2 (recent admission for DKA), HTN presents from [facility] with generalized weakness found to have sepsis 2/2 PNA vs. UTI.      Compression BLE  Consider JOCELIN/PVR, XRay, Duplex, MRI, CT  BLE elevation  Abx per Medicine/ ID  Moisturize intact skin w/ SWEEN cream BID  con't Nutrition (as tolerated), Nutrition Consult  con't Offloading   con't Pericare  Care as per medicine will follow w/ you  Upon discharge [facility] [address] [place] [address] [phone]  Seen w/ attng and D/w team  Thank you for this consult  Mica Berman PA-C [id number] A/P:68M PMH metastatic neuroendocrine carcinoma (unknown primary, diagnosed via iliac bone biopsy, w/ pulmonary mets, mediastinal/hilar lymphadenopathy, hepatic mets, diffuse bone mets, s/p chemo/RT w/ progression of disease), DM2 (recent admission for DKA), HTN presents from [facility] with generalized weakness found to have sepsis 2/2 PNA vs. UTI.      Stage 3 sacral pressure ulcer- ALLEVYN foam  BLE elevation  Abx per Medicine/ ID  Moisturize intact skin w/ SWEEN cream BID  con't Nutrition (as tolerated), Nutrition Consult  con't Offloading   con't Pericare  Care as per medicine will follow w/ you  Upon discharge f/u as outpatient at [facility] [address] [place] [address] [phone]  Seen w/ attng and D/w team  Thank you for this consult  Mica Berman PA-C CWS [id number]

## 2018-06-15 NOTE — CONSULT NOTE ADULT - ASSESSMENT
69 y/o M w/ uncontrolled Type 2 DM ketosis prone w/ HTN and HLD admitted with confusion and weakness

## 2018-06-15 NOTE — DIETITIAN INITIAL EVALUATION ADULT. - PROBLEM SELECTOR PLAN 5
- stage IV neuroendocrine cancer w/ pulmonary mets, mediastinal / hilar lymphadenopathy, hepatic mets, bone mets  - will consult oncology regarding further plan of care - per [employer] note if patient has progression of disease will likely not recommend further treatment and would recommend palliative consult  - will discuss with onc need for repeat CT scans for staging to determine if POD.

## 2018-06-15 NOTE — CONSULT NOTE ADULT - PROBLEM SELECTOR RECOMMENDATION 3
As per Onc, at this time, the patient is not a candidate for DMT. However, they will f/u and readdress options for Rx if the patient's condition were to improve.

## 2018-06-15 NOTE — DIETITIAN INITIAL EVALUATION ADULT. - PROBLEM SELECTOR PLAN 1
- patient meets sepsis criteria w/ fevers, leukocytosis, tachycardia, tachypnea   - likely 2/2 PNA vs. UTI   - f/u blood and urine cultures  - lactate 3.9, will trend after IVF   - will treat with cefepime / vanc for hospital acquired PNA as patient recently hospitalized, discharged 2 days ago   - will give additional IVF and repeat lactate.

## 2018-06-15 NOTE — DIETITIAN INITIAL EVALUATION ADULT. - PROBLEM SELECTOR PLAN 4
- sinus tach 115, per chart review patient w/ chronic tachycardia   - likely 2/2 infection as well as metastatic cancer and bone pain   - will give IVF  - EKG w/ new TWI in II, III, avF likely 2/2 tachycardia

## 2018-06-15 NOTE — PROGRESS NOTE ADULT - PROBLEM SELECTOR PLAN 6
- home regimen: Lantus 8 mg qhs, prandin 0.5 mg TID   - on ISS, humalog tid-ac and lantus qhs  - Endo following

## 2018-06-15 NOTE — CONSULT NOTE ADULT - SUBJECTIVE AND OBJECTIVE BOX
Wound SURGERY CONSULT NOTE    HPI:  68M PMH metastatic neuroendocrine carcinoma (unknown primary, diagnosed via iliac bone biopsy, w/ pulmonary mets, mediastinal/hilar lymphadenopathy, hepatic mets, diffuse bone mets, s/p chemo/RT w/ progression of disease), DM2 (recent admission for DKA), HTN presents from [facility] with generalized weakness.  Patient had recent admission -  for DKA, was on Lantus and Invokana.  Invokana was d/c'd, and he was discharged on Lantus and Prandin per endocrine.  Patient was also treated for a UTI w/ ceftriaxone.  He was at [facility] today and daughter c/o decreased appetite, abdominal pain.  Daughter reports he has been intermittently confused but that today he is less confused.  She reports dry cough for the past 2 days since leaving the hospital.  Denies wheezing, fevers, chills, shortness of breath, diarrhea, nausea, vomiting.  He did not take his lantus this am because he was going to the doctor, usually takes lantus 8 units, prandin 0.5 mg TID.  She denies hypoglycemia, has been checking his blood sugars and they have been 90s-100s, had one reading of 600 yesterday after eating a sandwitch so gave extra lantus.      In regards to his neuroendocrine tumor, he was diagnosed by bone biopsy 2017, was treated with carboplatin/etoposide until 2018 when he was discovered to have POD.  He is planning on starting immunotherapy.       In the ED .8 122 130/82 20 98% RA  Labs w/ 12.9 w/ L shift, hgb 10.9.  Na 132 (corrected for glucose), Cr 0.55.  VBG w/ ph 7.4, lactate 3.9, alk phos 1107, , ALT 38.  CXR w/ new L pleural effusion.  Blood and urine cultures pending.    UA grossly positive w/ + LE, 26-50 WBCs, moderate ketones. +yeast   In the ED given cefepime 2L IVF, morphine 2 mg (2018 19:35)      PAST MEDICAL & SURGICAL HISTORY:  Neuroendocrine cancer  Hyperlipidemia  Diabetes  HTN (hypertension)  No significant past surgical history      REVIEW OF SYSTEMS      General:	    Skin/Breast:  	  Ophthalmologic:  	  ENMT:	    Respiratory and Thorax:  	  Cardiovascular:	    Gastrointestinal:	    Genitourinary:	    Musculoskeletal:	    Neurological:	    Psychiatric:	    Hematology/Lymphatics:	    Endocrine:	    Allergic/Immunologic:	  Pt unable to offer  Skin/ MSK: see HPI  All other systems negative    MEDICATIONS  (STANDING):  ascorbic acid 500 milliGRAM(s) Oral daily  cefepime   IVPB 1000 milliGRAM(s) IV Intermittent every 8 hours  dextrose 5%. 1000 milliLiter(s) (50 mL/Hr) IV Continuous <Continuous>  dextrose 50% Injectable 12.5 Gram(s) IV Push once  dextrose 50% Injectable 25 Gram(s) IV Push once  dextrose 50% Injectable 25 Gram(s) IV Push once  docusate sodium 100 milliGRAM(s) Oral three times a day  enoxaparin Injectable 40 milliGRAM(s) SubCutaneous daily  insulin glargine Injectable (LANTUS) 6 Unit(s) SubCutaneous every morning  insulin lispro (HumaLOG) corrective regimen sliding scale   SubCutaneous three times a day before meals  insulin lispro (HumaLOG) corrective regimen sliding scale   SubCutaneous at bedtime  morphine ER Tablet 15 milliGRAM(s) Oral every 12 hours  multivitamin 1 Tablet(s) Oral daily  polyethylene glycol 3350 17 Gram(s) Oral daily  potassium phosphate IVPB 15 milliMole(s) IV Intermittent once  senna 2 Tablet(s) Oral at bedtime  sodium chloride 0.9%. 1000 milliLiter(s) (150 mL/Hr) IV Continuous <Continuous>  vancomycin  IVPB 1000 milliGRAM(s) IV Intermittent every 12 hours    MEDICATIONS  (PRN):  ALPRAZolam 0.5 milliGRAM(s) Oral every 12 hours PRN anxiety  dextrose 40% Gel 15 Gram(s) Oral once PRN Blood Glucose LESS THAN 70 milliGRAM(s)/deciliter  glucagon  Injectable 1 milliGRAM(s) IntraMuscular once PRN Glucose LESS THAN 70 milligrams/deciliter  ondansetron    Tablet 8 milliGRAM(s) Oral three times a day PRN Nausea and/or Vomiting  oxyCODONE    IR 5 milliGRAM(s) Oral every 4 hours PRN moderate to severe pain (4-10)      Allergies    No Known Allergies    Intolerances        SOCIAL HISTORY:  / /single/ ; (+)HHA/ lives in SNF; Former smoker, Denies smoking, ETOH, drugs    FAMILY HISTORY:  No pertinent family history in first degree relatives      Vital Signs Last 24 Hrs  T(C): 36.9 (15 Pierre 2018 04:57), Max: 38.2 (2018 15:00)  T(F): 98.4 (15 Pierre 2018 04:57), Max: 100.8 (2018 15:00)  HR: 110 (15 Pierre 2018 04:57) (110 - 122)  BP: 120/75 (15 Pierre 2018 04:57) (120/75 - 130/82)  BP(mean): --  RR: 20 (15 Pierre 2018 04:57) (20 - 20)  SpO2: 94% (15 Pierre 2018 04:57) (94% - 100%)    NAD / gaurded but stable,  A&Ox3/ Alert/ Confused  cachectic/ MO/ Obese/ frail  WD/ WN/ WG/ Disheveled  Total Care Sport/ Versa Care P500 bed/ [place]    Cardiovascular: RRR (+)m    Respiratory: CTA    Gastrointestinal soft NT/ND (+)BS  (+)PEG    Neurology  weakened strength & sensation grossly intact/ paraesthesia  nonverbal, no follow commands/ paraplegic    Musculoskeletal/Vascular:  ROM  >LE //BLE edema equal  DP/PT pulses palpable  BLE equally warm/ cool  no acute ischemia noted  hemosiderin staining  no deformities/ contractures    Skin:  moist w/ good turgor  frail,  ecchymosis w/o hematoma  serosanguinous drainage  No odor, erythema, increased warmth, tenderness, induration, fluctuance    LABS:  06-15    131<L>  |  96  |  12  ----------------------------<  110<H>  3.4<L>   |  22  |  0.39<L>    Ca    8.2<L>      15 Pierre 2018 06:14  Phos  2.2     06-15  Mg     1.6     15    TPro  7.9  /  Alb  2.9<L>  /  TBili  0.9  /  DBili  x   /  AST  200<H>  /  ALT  38  /  AlkPhos  1107<H>                            9.6    11.0  )-----------( 180      ( 15 Pierre 2018 06:14 )             29.9       Urinalysis Basic - ( 2018 16:01 )    Color: Yellow / Appearance: SL Turbid / S.024 / pH: x  Gluc: x / Ketone: Moderate  / Bili: Small / Urobili: 2 mg/dL   Blood: x / Protein: 100 mg/dL / Nitrite: Negative   Leuk Esterase: Large / RBC: 5-10 /HPF / WBC 26-50 /HPF   Sq Epi: x / Non Sq Epi: OCC /HPF / Bacteria: Few /HPF        RADIOLOGY & ADDITIONAL STUDIES:  Cultures: Wound SURGERY CONSULT NOTE    HPI:  68M PMH metastatic neuroendocrine carcinoma (unknown primary, diagnosed via iliac bone biopsy, w/ pulmonary mets, mediastinal/hilar lymphadenopathy, hepatic mets, diffuse bone mets, s/p chemo/RT w/ progression of disease), DM2 (recent admission for DKA), HTN presents from [facility] with generalized weakness.  Patient had recent admission -  for DKA, was on Lantus and Invokana.  Invokana was d/c'd, and he was discharged on Lantus and Prandin per endocrine.  Patient was also treated for a UTI w/ ceftriaxone.  He was at [facility] yesterday and daughter c/o decreased appetite, abdominal pain.  Daughter reports he has been intermittently confused but that today he is less confused.  She reported dry cough for the past 2 days since leaving the hospital.  Denies wheezing, fevers, chills, shortness of breath, diarrhea, nausea, vomiting.  He did not take his lantus this am because he was going to the doctor, usually takes lantus 8 units, prandin 0.5 mg TID.  She denies hypoglycemia, has been checking his blood sugars and they have been 90s-100s, had one reading of 600 yesterday after eating a sandwitch so gave extra lantus.      In regards to his neuroendocrine tumor, he was diagnosed by bone biopsy 2017, was treated with carboplatin/etoposide until 2018 when he was discovered to have POD.  He is planning on starting immunotherapy.       Pt was noted with a small sacral wound.  Consult requested to assist w/ management.  Pt's daughter & wife, & sister came after pt was examined.  He had been increasingly sedentary & lethargic prior to recent  hospitalization & daughter noted skin breakdown.   We discussed importance of nutrition and offloading and pericare which has been well adhered to in the hospital.  Daughter was happy that he was eating better and gained 6lbs in last hospital admission.   Offloading & pericare protocols were maintained.  pt is continent of stool & urine.  Foam is comfortable to him, less pain.  Scant clear drainage contained.  no odor, redness, warmth, f/c/s noted.   Instructed daughter to take home offloading devices as they are single pt use & will continue to be helpful at home to her father.   All questions asked and answered to pt & family's satisfaction.     PAST MEDICAL & SURGICAL HISTORY:  Neuroendocrine cancer  Hyperlipidemia  Diabetes  HTN (hypertension)  No significant past surgical history      REVIEW OF SYSTEMS  Skin: see HPI  All other systems negative    MEDICATIONS  (STANDING):  ascorbic acid 500 milliGRAM(s) Oral daily  cefepime   IVPB 1000 milliGRAM(s) IV Intermittent every 8 hours  dextrose 5%. 1000 milliLiter(s) (50 mL/Hr) IV Continuous <Continuous>  dextrose 50% Injectable 12.5 Gram(s) IV Push once  dextrose 50% Injectable 25 Gram(s) IV Push once  dextrose 50% Injectable 25 Gram(s) IV Push once  docusate sodium 100 milliGRAM(s) Oral three times a day  enoxaparin Injectable 40 milliGRAM(s) SubCutaneous daily  insulin glargine Injectable (LANTUS) 6 Unit(s) SubCutaneous every morning  insulin lispro (HumaLOG) corrective regimen sliding scale   SubCutaneous three times a day before meals  insulin lispro (HumaLOG) corrective regimen sliding scale   SubCutaneous at bedtime  morphine ER Tablet 15 milliGRAM(s) Oral every 12 hours  multivitamin 1 Tablet(s) Oral daily  polyethylene glycol 3350 17 Gram(s) Oral daily  potassium phosphate IVPB 15 milliMole(s) IV Intermittent once  senna 2 Tablet(s) Oral at bedtime  sodium chloride 0.9%. 1000 milliLiter(s) (150 mL/Hr) IV Continuous <Continuous>  vancomycin  IVPB 1000 milliGRAM(s) IV Intermittent every 12 hours    MEDICATIONS  (PRN):  ALPRAZolam 0.5 milliGRAM(s) Oral every 12 hours PRN anxiety  dextrose 40% Gel 15 Gram(s) Oral once PRN Blood Glucose LESS THAN 70 milliGRAM(s)/deciliter  glucagon  Injectable 1 milliGRAM(s) IntraMuscular once PRN Glucose LESS THAN 70 milligrams/deciliter  ondansetron    Tablet 8 milliGRAM(s) Oral three times a day PRN Nausea and/or Vomiting  oxyCODONE    IR 5 milliGRAM(s) Oral every 4 hours PRN moderate to severe pain (4-10)    No Known Allergies    SOCIAL HISTORY:  , Denies smoking, ETOH, drugs    FAMILY HISTORY:  No pertinent family history in first degree relatives      Vital Signs Last 24 Hrs  T(C): 36.9 (15 Pierre 2018 04:57), Max: 38.2 (2018 15:00)  T(F): 98.4 (15 Pierre 2018 04:57), Max: 100.8 (2018 15:00)  HR: 110 (15 Pierre 2018 04:57) (110 - 122)  BP: 120/75 (15 Pierre 2018 04:57) (120/75 - 130/82)  BP(mean): --  RR: 20 (15 Pierre 2018 04:57) (20 - 20)  SpO2: 94% (15 Pierre 2018 04:57) (94% - 100%)    NAD / Alert, but confused  cachectic/  frail  WD/ WN/ WG  Versa Care P500 bed    Cardiovascular: RRR     Respiratory: CTA    Gastrointestinal soft NT/ND (+)BS     Neurology  weakened strength & sensation grossly intact    Musculoskeletal/Vascular:  FROM x4  no  edema   BLE equally warm  no acute ischemia noted  no deformities/ contractures    Skin:  moist but frail  w/ decreased turgor    Sacrum unstageable pressure ulcer  2cm x 1cm x 0.2 cm  wound base w/ moist & granular  Protuberant sacrum/ coccyx bone  (+)serosanguinous drainage  No odor, erythema, increased warmth, tenderness, induration, fluctuance      LABS:  06-15    131<L>  |  96  |  12  ----------------------------<  110<H>  3.4<L>   |  22  |  0.39<L>    Ca    8.2<L>      15 Pierre 2018 06:14  Phos  2.2     06-15  Mg     1.6     -15    TPro  7.9  /  Alb  2.9<L>  /  TBili  0.9  /  DBili  x   /  AST  200<H>  /  ALT  38  /  AlkPhos  1107<H>                            9.6    11.0  )-----------( 180      ( 15 Pierre 2018 06:14 )             29.9       Urinalysis Basic - ( 2018 16:01 )    Color: Yellow / Appearance: SL Turbid / S.024 / pH: x  Gluc: x / Ketone: Moderate  / Bili: Small / Urobili: 2 mg/dL   Blood: x / Protein: 100 mg/dL / Nitrite: Negative   Leuk Esterase: Large / RBC: 5-10 /HPF / WBC 26-50 /HPF   Sq Epi: x / Non Sq Epi: OCC /HPF / Bacteria: Few /HPF        RADIOLOGY & ADDITIONAL STUDIES:      < from: Xray Chest 1 View- PORTABLE-Urgent (18 @ 17:38) >  FINDINGS:   Hazy opacity in the left lower lung field. There is no pleural effusion   or pneumothorax.  The heart size is normal.     IMPRESSION:   Hazy opacity in the left lower lung field, which may represent pneumonia   in the right clinical setting.

## 2018-06-15 NOTE — DIETITIAN INITIAL EVALUATION ADULT. - PROBLEM SELECTOR PLAN 2
- pt w/ L base opacity on CXR   - reports dry cough since discharge   - check RVP, c/w cefepime/vanc for hospital acquired PNA

## 2018-06-15 NOTE — DIETITIAN INITIAL EVALUATION ADULT. - PROBLEM SELECTOR PLAN 6
- home regimen: Lantus 8 mg qhs, prandin 0.5 mg TID   - patient did not take lantus this AM, has +ketones in urine, , will give 8 units of NPH now w/ ISS, will resume am lantus at 6 units  - ph 7.4, bicarb 23, unlikely recurrent DKA, will check BHB   - consistent carb diet

## 2018-06-15 NOTE — CONSULT NOTE ADULT - PROBLEM SELECTOR RECOMMENDATION 2
- management per medicine, concern for pneumonia  - will monitor patient's functional status and re-assess GOC
Likely 2/2 to sepsis over advanced CA.   Abx as per primary.

## 2018-06-15 NOTE — DIETITIAN INITIAL EVALUATION ADULT. - PT NOT SOURCE
confused/Per chart, pt is Syriac speaking, admitted with confusion, forgetful and disoriented per chart. Offered  phone, but pt declined and asked RD to speak with daughter- not available at time of visit

## 2018-06-15 NOTE — DIETITIAN INITIAL EVALUATION ADULT. - SOURCE
Medical records, recent previous RD note (6/8/2018) Medical records, recent previous RD note (6/8/2018), RN, Team A

## 2018-06-15 NOTE — CONSULT NOTE ADULT - ASSESSMENT
68M PMH metastatic neuroendocrine carcinoma (unknown primary, diagnosed via iliac bone biopsy, w/ pulmonary mets, mediastinal/hilar lymphadenopathy, hepatic mets, diffuse bone mets, s/p chemo/RT w/ progression of disease), DM2 (recent admission for DKA), HTN presents from [facility] with generalized weakness.   Patient was also treated for a UTI. This time he was found to have sepsis 2/2 PNA vs. UTI.  This consult was called for pain Rx.

## 2018-06-15 NOTE — PROGRESS NOTE ADULT - PROBLEM SELECTOR PLAN 5
- stage IV neuroendocrine cancer w/ pulmonary mets, mediastinal / hilar lymphadenopathy, hepatic mets, bone mets  - recent CT chest, A/P demonstrated progression of disease  -Onc following  - Per onc, patient may not be a candidate for future disease modifying therapy given POD and failure to thrive  -Palliative consult; f/u recs

## 2018-06-15 NOTE — CONSULT NOTE ADULT - SUBJECTIVE AND OBJECTIVE BOX
HPI:  68M PMH metastatic neuroendocrine carcinoma (unknown primary, diagnosed via iliac bone biopsy, w/ pulmonary mets, mediastinal/hilar lymphadenopathy, hepatic mets, diffuse bone mets, s/p chemo/RT w/ progression of disease), DM2 (recent admission for DKA), HTN presents from [facility] with generalized weakness.  Patient had recent admission -  for DKA, was on Lantus and Invokana.  Invokana was d/c'd, and he was discharged on Lantus and Prandin per endocrine.  Patient was also treated for a UTI w/ ceftriaxone.  He was at [facility] today and daughter c/o decreased appetite, abdominal pain.  Daughter reports he has been intermittently confused but that today he is less confused.  She reports dry cough for the past 2 days since leaving the hospital.  Denies wheezing, fevers, chills, shortness of breath, diarrhea, nausea, vomiting.  He did not take his lantus this am because he was going to the doctor, usually takes lantus 8 units, prandin 0.5 mg TID.  She denies hypoglycemia, has been checking his blood sugars and they have been 90s-100s, had one reading of 600 yesterday after eating a sandwitch so gave extra lantus.      In regards to his neuroendocrine tumor, he was diagnosed by bone biopsy 2017, was treated with carboplatin/etoposide until 2018 when he was discovered to have POD.  He is planning on starting immunotherapy.       In the ED .8 122 130/82 20 98% RA  Labs w/ 12.9 w/ L shift, hgb 10.9.  Na 132 (corrected for glucose), Cr 0.55.  VBG w/ ph 7.4, lactate 3.9, alk phos 1107, , ALT 38.  CXR w/ new L pleural effusion.  Blood and urine cultures pending.    UA grossly positive w/ + LE, 26-50 WBCs, moderate ketones. +yeast   In the ED given cefepime 2L IVF, morphine 2 mg (2018 19:35)      PERTINENT PMH REVIEWED:  [ ] YES [ ] NO           SOCIAL HISTORY:   Significant other/partner:               Children:                   Latter-day/Spirituality:  Substance hx:  [ ] YES   [x] NO                   Tobacco hx:  [ ] YES  [ ] NO                       Alcohol hx: [ ] YES  [ ] NO         Home Opioid hx:  [ ] YES  [x ] NO   Living Situation: [ ] Home  [ ] Long term care  [ ] Rehab [ ] Other    FAMILY HISTORY:  No pertinent family history in first degree relatives    [ ] Family history non-contributory     BASELINE (I)ADLs (prior to admission):  [place]: [ ] total  [ ] moderate [ ] dependent    ADVANCE DIRECTIVES:    DNR [ ] YES [ ] NO                            [ ] Completed  MOLST  [ ] YES [ ] NO                      [ ] Completed  Health Care Proxy [ ] YES  [ ] NO   [ ] Completed  Living Will  [ ] YES [ ] NO             [ ] Surrogate  [ ] HCP  [ ] Guardian:                                                                  Phone#:    Allergies    No Known Allergies    Intolerances        MEDICATIONS  (STANDING):  ascorbic acid 500 milliGRAM(s) Oral daily  cefepime   IVPB 1000 milliGRAM(s) IV Intermittent every 8 hours  dextrose 5%. 1000 milliLiter(s) (50 mL/Hr) IV Continuous <Continuous>  dextrose 50% Injectable 12.5 Gram(s) IV Push once  dextrose 50% Injectable 25 Gram(s) IV Push once  dextrose 50% Injectable 25 Gram(s) IV Push once  docusate sodium 100 milliGRAM(s) Oral three times a day  enoxaparin Injectable 40 milliGRAM(s) SubCutaneous daily  HYDROmorphone  Injectable 0.5 milliGRAM(s) IV Push every 4 hours  insulin glargine Injectable (LANTUS) 6 Unit(s) SubCutaneous every morning  insulin lispro (HumaLOG) corrective regimen sliding scale   SubCutaneous three times a day before meals  insulin lispro (HumaLOG) corrective regimen sliding scale   SubCutaneous at bedtime  insulin lispro Injectable (HumaLOG) 2 Unit(s) SubCutaneous three times a day before meals  multivitamin 1 Tablet(s) Oral daily  polyethylene glycol 3350 17 Gram(s) Oral daily  potassium phosphate IVPB 15 milliMole(s) IV Intermittent once  senna 2 Tablet(s) Oral at bedtime  sodium chloride 0.9%. 1000 milliLiter(s) (150 mL/Hr) IV Continuous <Continuous>  vancomycin  IVPB 1000 milliGRAM(s) IV Intermittent every 12 hours    MEDICATIONS  (PRN):  ALPRAZolam 0.5 milliGRAM(s) Oral every 12 hours PRN anxiety  dextrose 40% Gel 15 Gram(s) Oral once PRN Blood Glucose LESS THAN 70 milliGRAM(s)/deciliter  glucagon  Injectable 1 milliGRAM(s) IntraMuscular once PRN Glucose LESS THAN 70 milligrams/deciliter  HYDROmorphone  Injectable 0.5 milliGRAM(s) IV Push every 4 hours PRN Severe Breakthrough pain  ondansetron    Tablet 8 milliGRAM(s) Oral three times a day PRN Nausea and/or Vomiting      PRESENT SYMPTOMS:  Source: [ ] Patient   [ ] Family   [ ] Team     Pain:                        [ ] No [ ] Yes             [ ] Mild [ ] Moderate [ ] Severe    Onset -  Location -  Duration -  Character -  Alleviating/Aggravating -  Radiation -  Timing -      Dyspnea:                [ ] No [ ] Yes             [ ] Mild [ ] Moderate [ ] Severe    Anxiety:                  [ ] No [ ] Yes             [ ] Mild [ ] Moderate [ ] Severe    Fatigue:                  [ ] No [ ] Yes             [ ] Mild [ ] Moderate [ ] Severe    Nausea:                  [ ] No [ ] Yes             [ ] Mild [ ] Moderate [ ] Severe    Loss of appetite:   [ ] No [ ] Yes             [ ] Mild [ ] Moderate [ ] Severe    Constipation:        [ ] No [ ] Yes             [ ] Mild [ ] Moderate [ ] Severe    Other Symptoms:  [ ] All other review of systems negative   [ ] Unable to obtain due to poor mentation     Karnofsky Performance Score/Palliative Performance Status Version 2:         %    PHYSICAL EXAM:  Vital Signs Last 24 Hrs  T(C): 37.1 (15 Pierre 2018 12:58), Max: 37.3 (2018 18:46)  T(F): 98.7 (15 Pierre 2018 12:58), Max: 99.2 (2018 18:46)  HR: 110 (15 Pierre 2018 12:58) (110 - 116)  BP: 120/77 (15 Pierre 2018 12:58) (120/75 - 121/79)  BP(mean): --  RR: 20 (15 Pierre 2018 12:58) (20 - 20)  SpO2: 95% (15 Pierre 2018 12:58) (94% - 100%) I&O's Summary    2018 07:01  -  15 Pierre 2018 07:00  --------------------------------------------------------  IN: 1200 mL / OUT: 300 mL / NET: 900 mL        General:  [  ] Normal. Alert, Oriented x 3.     HEENT:  [ ] Normal   [ ] Dry mouth   [ ] ET Tube    [ ] Trach  [ ] Oral lesions    Lungs:   [ ] Clear [ ] Tachypnea  [ ] Audible excessive secretions   [ ] Rhonchi        [ ] Right [ ] Left [ ] Bilateral  [ ] Crackles        [ ] Right [ ] Left [ ] Bilateral  [ ] Wheezing     [ ] Right [ ] Left [ ] Bilateral    Cardiovascular:  S1, S2. No S3. No murmurs     Abdomen: [ ] Soft  [ ] Distended   [ ] +BS  [ ] Non tender [ ] Tender  [ ]PEG   [ ]OGT/ NGT   Last BM:       Genitourinary: [ ] Normal [ ] Incontinent   [ ] Oliguria/Anuria   [ ] Moore    Musculoskeletal:  [ ] Normal   [ ] Weakness  [ ] Bedbound/Wheelchair bound [ ] Edema    Neurological: [ ] No focal deficits  [ ] Cognitive impairment  [ ] Dysphagia [ ] Dysarthria [ ] Paresis [ ] Other     Skin: [ ] Normal   [ ] Pressure ulcer(s)                  [ ] Rash    LABS:                        9.6    11.0  )-----------( 180      ( 15 Pierre 2018 06:14 )             29.9     06-15    131<L>  |  96  |  12  ----------------------------<  110<H>  3.4<L>   |  22  |  0.39<L>    Ca    8.2<L>      15 Pierre 2018 06:14  Phos  2.2     06-15  Mg     1.6     06-15    TPro  7.9  /  Alb  2.9<L>  /  TBili  0.9  /  DBili  x   /  AST  200<H>  /  ALT  38  /  AlkPhos  1107<H>  06-14      Urinalysis Basic - ( 2018 16:01 )    Color: Yellow / Appearance: SL Turbid / S.024 / pH: x  Gluc: x / Ketone: Moderate  / Bili: Small / Urobili: 2 mg/dL   Blood: x / Protein: 100 mg/dL / Nitrite: Negative   Leuk Esterase: Large / RBC: 5-10 /HPF / WBC 26-50 /HPF   Sq Epi: x / Non Sq Epi: OCC /HPF / Bacteria: Few /HPF        Shock: [ ] Septic [ ] Cardiogenic [ ] Neurologic [ ] Hypovolemic  Vasopressors x   Inotrophs x     Protein Calorie Malnutrition: [ ] Mild [ ] Moderate [ ] Severe    Oral Intake: [ ] Unable/mouth care only [ ] Minimal [ ] Moderate [ ] Full Capability  Diet: [ ] NPO [ ] Tube feeds [ ] TPN [ ] Other     RADIOLOGY & ADDITIONAL STUDIES:    REFERRALS:   [ ] Chaplaincy  [ ] Hospice  [ ] Child Life  [ ] Social Work  [ ] Case management [ ] Holistic Therapy     Jasvir Camara MD [phone]  Assessment and Plan: HPI:  68M PMH metastatic neuroendocrine carcinoma (unknown primary, diagnosed via iliac bone biopsy, w/ pulmonary mets, mediastinal/hilar lymphadenopathy, hepatic mets, diffuse bone mets, s/p chemo/RT w/ progression of disease), DM2 (recent admission for DKA), HTN presents from [facility] with generalized weakness.   Patient was also treated for a UTI. This time he was found to have sepsis 2/2 PNA vs. UTI.  This consult was called for pain Rx.       PERTINENT PMH REVIEWED:  [x ] YES [ ] NO           SOCIAL HISTORY:   Significant other/partner:               Children:                   Mormonism/Spirituality:  Substance hx:  [ ] YES   [x] NO                   Tobacco hx:  [ ] YES  [ ] NO                       Alcohol hx: [ ] YES  [ ] NO         Home Opioid hx:  [ ] YES  [x ] NO   Living Situation: [ ] Home  [ ] Long term care  [ ] Rehab [ ] Other  As per case coordination notes:  "Pt lives in a private home with 4  steps to enter with his wife and his daughter, Shanel, that he names as  caregiver. Her phone is [phone] and she provides physical care to patient  as well. He uses no DME."  FAMILY HISTORY:  No pertinent family history in first degree relatives    [ ] Family history non-contributory     BASELINE (I)ADLs (prior to admission):  [place]: [ ] total  [ ] moderate [ ] dependent    ADVANCE DIRECTIVES:    DNR [ ] YES [ ] NO                            [ ] Completed  MOLST  [ ] YES [ ] NO                      [ ] Completed  Health Care Proxy [ ] YES  [ ] NO   [ ] Completed  Living Will  [ ] YES [ ] NO             [x ] Surrogate  [ ] HCP  [ ] Guardian:      Wife                                                              Phone#:    Allergies    No Known Allergies    Intolerances        MEDICATIONS  (STANDING):  ascorbic acid 500 milliGRAM(s) Oral daily  cefepime   IVPB 1000 milliGRAM(s) IV Intermittent every 8 hours  dextrose 5%. 1000 milliLiter(s) (50 mL/Hr) IV Continuous <Continuous>  dextrose 50% Injectable 12.5 Gram(s) IV Push once  dextrose 50% Injectable 25 Gram(s) IV Push once  dextrose 50% Injectable 25 Gram(s) IV Push once  docusate sodium 100 milliGRAM(s) Oral three times a day  enoxaparin Injectable 40 milliGRAM(s) SubCutaneous daily  HYDROmorphone  Injectable 0.5 milliGRAM(s) IV Push every 4 hours  insulin glargine Injectable (LANTUS) 6 Unit(s) SubCutaneous every morning  insulin lispro (HumaLOG) corrective regimen sliding scale   SubCutaneous three times a day before meals  insulin lispro (HumaLOG) corrective regimen sliding scale   SubCutaneous at bedtime  insulin lispro Injectable (HumaLOG) 2 Unit(s) SubCutaneous three times a day before meals  multivitamin 1 Tablet(s) Oral daily  polyethylene glycol 3350 17 Gram(s) Oral daily  potassium phosphate IVPB 15 milliMole(s) IV Intermittent once  senna 2 Tablet(s) Oral at bedtime  sodium chloride 0.9%. 1000 milliLiter(s) (150 mL/Hr) IV Continuous <Continuous>  vancomycin  IVPB 1000 milliGRAM(s) IV Intermittent every 12 hours    MEDICATIONS  (PRN):  ALPRAZolam 0.5 milliGRAM(s) Oral every 12 hours PRN anxiety  dextrose 40% Gel 15 Gram(s) Oral once PRN Blood Glucose LESS THAN 70 milliGRAM(s)/deciliter  glucagon  Injectable 1 milliGRAM(s) IntraMuscular once PRN Glucose LESS THAN 70 milligrams/deciliter  HYDROmorphone  Injectable 0.5 milliGRAM(s) IV Push every 4 hours PRN Severe Breakthrough pain  ondansetron    Tablet 8 milliGRAM(s) Oral three times a day PRN Nausea and/or Vomiting      PRESENT SYMPTOMS:  Source: [ x] Patient   [ ] Family   [x ] Team     Pain:                        [ ] No [ x] Yes             [ ] Mild [ x] Moderate [ ] Severe. Pain AD 6    Onset -  Location -  Duration -  Character -  Alleviating/Aggravating -  Radiation -  Timing -      Dyspnea:                [ ] No [ ] Yes             [ ] Mild [ ] Moderate [ ] Severe    Anxiety:                  [ ] No [ ] Yes             [ ] Mild [ ] Moderate [ ] Severe    Fatigue:                  [ ] No [ ] Yes             [ ] Mild [ ] Moderate [ ] Severe    Nausea:                  [ ] No [ ] Yes             [ ] Mild [ ] Moderate [ ] Severe    Loss of appetite:   [ ] No [ ] Yes             [ ] Mild [ ] Moderate [ ] Severe    Constipation:        [ ] No [ ] Yes             [ ] Mild [ ] Moderate [ ] Severe    Other Symptoms:  [ ] All other review of systems negative   [x ] Unable to obtain due to poor mentation     Karnofsky Performance Score/Palliative Performance Status Version 2: 30        %    PHYSICAL EXAM:  Vital Signs Last 24 Hrs  T(C): 37.1 (15 Pierre 2018 12:58), Max: 37.3 (2018 18:46)  T(F): 98.7 (15 Pierre 2018 12:58), Max: 99.2 (2018 18:46)  HR: 110 (15 Pierre 2018 12:58) (110 - 116)  BP: 120/77 (15 Pierre 2018 12:58) (120/75 - 121/79)  BP(mean): --  RR: 20 (15 Pierre 2018 12:58) (20 - 20)  SpO2: 95% (15 Pierre 2018 12:58) (94% - 100%) I&O's Summary    2018 07:01  -  15 Pierre 2018 07:00  --------------------------------------------------------  IN: 1200 mL / OUT: 300 mL / NET: 900 mL        General:  [ x ] Ill appearing male. Moderate distress due to pain. Lethargic and confused.  Cachexia     HEENT:  [x ] Normal   [ ] Dry mouth   [ ] ET Tube    [ ] Trach  [ ] Oral lesions    Lungs:   [x ] Clear [ ] Tachypnea  [ ] Audible excessive secretions   [ ] Rhonchi        [ ] Right [ ] Left [ ] Bilateral  [ ] Crackles        [ ] Right [ ] Left [ ] Bilateral  [ ] Wheezing     [ ] Right [ ] Left [ ] Bilateral    Cardiovascular:  S1, S2. No S3. No murmurs     Abdomen: [x ] Soft  [ ] Distended   [ ] +BS  [ ] Non tender [ ] Tender  [ ]PEG   [ ]OGT/ NGT   Last BM:   No documented bm since he was admitted.     Genitourinary: [ ] Normal [x ] Incontinent   [ ] Oliguria/Anuria   [ ] Moore    Musculoskeletal:  [ ] Normal   [x ] Weakness  [ ] Bedbound/Wheelchair bound [ ] Edema [x] Sarcopenia     Neurological: [ ] No focal deficits  [ ] Cognitive impairment  [ ] Dysphagia [ ] Dysarthria [ ] Paresis [x ] Other: Poor attention, Lethargic, confused.     Skin: [ x] Normal   [ ] Pressure ulcer(s)                  [ ] Rash    LABS:                        9.6    11.0  )-----------( 180      ( 15 Pierre 2018 06:14 )             29.9     06-15    131<L>  |  96  |  12  ----------------------------<  110<H>  3.4<L>   |  22  |  0.39<L>    Ca    8.2<L>      15 Pierre 2018 06:14  Phos  2.2     06-15  Mg     1.6     -15    TPro  7.9  /  Alb  2.9<L>  /  TBili  0.9  /  DBili  x   /  AST  200<H>  /  ALT  38  /  AlkPhos  1107<H>        Urinalysis Basic - ( 2018 16:01 )    Color: Yellow / Appearance: SL Turbid / S.024 / pH: x  Gluc: x / Ketone: Moderate  / Bili: Small / Urobili: 2 mg/dL   Blood: x / Protein: 100 mg/dL / Nitrite: Negative   Leuk Esterase: Large / RBC: 5-10 /HPF / WBC 26-50 /HPF   Sq Epi: x / Non Sq Epi: OCC /HPF / Bacteria: Few /HPF        Shock: [ ] Septic [ ] Cardiogenic [ ] Neurologic [ ] Hypovolemic  Vasopressors x   Inotrophs x     Protein Calorie Malnutrition: [ ] Mild [ ] Moderate [ ] Severe    Oral Intake: [ ] Unable/mouth care only [ ] Minimal [ ] Moderate [ ] Full Capability  Diet: [ ] NPO [ ] Tube feeds [ ] TPN [ ] Other     RADIOLOGY & ADDITIONAL STUDIES:  Reviewed.   REFERRALS:   [ ] Chaplaincy  [ ] Hospice  [ ] Child Life  [ ] Social Work  [ ] Case management [ ] Holistic Therapy     Jasvir Camara MD [phone]  Assessment and Plan:

## 2018-06-15 NOTE — CONSULT NOTE ADULT - PROBLEM SELECTOR RECOMMENDATION 9
Patient was on MS contin 15 mg q 12 ATC and Oxy 5 mg q 4 PRN.   Will recommend Dilaudid 0.2 mg IV q 4 ATC and 0.2 mg IV q 3 PRN   Monitoring for sedation/respiratory depression   Narcan PRN
Diabetes Education and Nutrition Eval  Start Lantus 6 units qhs  Start Humalog 2 units qac  Low correction scale qac + bedtime  Goal glucose 100-180  Outpt. endo follow-up  Outpt. optho, podiatry, nephrology  Plan to d/c on basal + Prandin depending on insulin requirements.
- progression on recent imaging  - poor performance status at present for further therapy  - consult palliative care   - will reassess GOC and role for any therapeutics depending on his clinical course, but currently appears he is not a candidate for further disease modifying therapy

## 2018-06-15 NOTE — CONSULT NOTE ADULT - SUBJECTIVE AND OBJECTIVE BOX
HPI:    68M PMH metastatic neuroendocrine carcinoma w/ pulmonary mets, mediastinal/hilar lymphadenopathy, hepatic mets, diffuse bone mets s/p 6 cycles of carboplatin/etoposide completed 2/2018, and treated with palliative RT to the spine who presents with failure to thrive, ambulatory dysfunction. Of note patient recently admitted for Invokana-associated euglycemic DKA and UTI. Patient seen by his oncologist yesterday, Dr. Yari Dick, recommended readmission for poor appetite, abdominal pain, difficult ambulating and overall poor functional status. Patient also having a cough since his discharge. Patient was tentatively planned to start immunotherapy. Patient's recent CT shows pulmonary, hepatic and osseous metastatic disease. The liver lesions have increased in size compared to prior in April     Allergies    No Known Allergies    Intolerances        MEDICATIONS  (STANDING):  ascorbic acid 500 milliGRAM(s) Oral daily  cefepime   IVPB 1000 milliGRAM(s) IV Intermittent every 8 hours  dextrose 5%. 1000 milliLiter(s) (50 mL/Hr) IV Continuous <Continuous>  dextrose 50% Injectable 12.5 Gram(s) IV Push once  dextrose 50% Injectable 25 Gram(s) IV Push once  dextrose 50% Injectable 25 Gram(s) IV Push once  docusate sodium 100 milliGRAM(s) Oral three times a day  enoxaparin Injectable 40 milliGRAM(s) SubCutaneous daily  insulin glargine Injectable (LANTUS) 6 Unit(s) SubCutaneous every morning  insulin lispro (HumaLOG) corrective regimen sliding scale   SubCutaneous three times a day before meals  insulin lispro (HumaLOG) corrective regimen sliding scale   SubCutaneous at bedtime  morphine ER Tablet 15 milliGRAM(s) Oral every 12 hours  multivitamin 1 Tablet(s) Oral daily  polyethylene glycol 3350 17 Gram(s) Oral daily  potassium phosphate IVPB 15 milliMole(s) IV Intermittent once  senna 2 Tablet(s) Oral at bedtime  sodium chloride 0.9%. 1000 milliLiter(s) (150 mL/Hr) IV Continuous <Continuous>  vancomycin  IVPB 1000 milliGRAM(s) IV Intermittent every 12 hours    MEDICATIONS  (PRN):  ALPRAZolam 0.5 milliGRAM(s) Oral every 12 hours PRN anxiety  dextrose 40% Gel 15 Gram(s) Oral once PRN Blood Glucose LESS THAN 70 milliGRAM(s)/deciliter  glucagon  Injectable 1 milliGRAM(s) IntraMuscular once PRN Glucose LESS THAN 70 milligrams/deciliter  ondansetron    Tablet 8 milliGRAM(s) Oral three times a day PRN Nausea and/or Vomiting  oxyCODONE    IR 5 milliGRAM(s) Oral every 4 hours PRN moderate to severe pain (4-10)      PAST MEDICAL & SURGICAL HISTORY:  Neuroendocrine cancer  Hyperlipidemia  Diabetes  HTN (hypertension)  No significant past surgical history      FAMILY HISTORY:  No pertinent family history in first degree relatives      SOCIAL HISTORY: No EtOH, no tobacco    REVIEW OF SYSTEMS:    CONSTITUTIONAL: No weakness, fevers or chills  EYES/ENT: No visual changes;  No vertigo or throat pain   NECK: No pain or stiffness  RESPIRATORY: No cough, wheezing, hemoptysis; No shortness of breath  CARDIOVASCULAR: No chest pain or palpitations  GASTROINTESTINAL: No abdominal or epigastric pain. No nausea, vomiting, or hematemesis; No diarrhea or constipation. No melena or hematochezia.  GENITOURINARY: No dysuria, frequency or hematuria  NEUROLOGICAL: No numbness or weakness  SKIN: No itching, burning, rashes, or lesions   All other review of systems is negative unless indicated above.    Height (cm): 157.48 (06-14 @ 20:09)  Weight (kg): 52 (06-14 @ 20:09)  BMI (kg/m2): 21 (06-14 @ 20:09)  BSA (m2): 1.51 (06-14 @ 20:09)    T(F): 98.4 (06-15-18 @ 04:57), Max: 100.8 (06-14-18 @ 15:00)  HR: 110 (06-15-18 @ 04:57)  BP: 120/75 (06-15-18 @ 04:57)  RR: 20 (06-15-18 @ 04:57)  SpO2: 94% (06-15-18 @ 04:57)  Wt(kg): --    GENERAL: NAD, well-developed  HEAD:  Atraumatic, Normocephalic  EYES: EOMI, PERRLA, conjunctiva and sclera clear  NECK: Supple, No JVD  CHEST/LUNG: Clear to auscultation bilaterally; No wheeze  HEART: Regular rate and rhythm; No murmurs, rubs, or gallops  ABDOMEN: Soft, Nontender, Nondistended; Bowel sounds present  EXTREMITIES:  2+ Peripheral Pulses, No clubbing, cyanosis, or edema  NEUROLOGY: non-focal  SKIN: No rashes or lesions                          9.6    11.0  )-----------( 180      ( 15 Pierre 2018 06:14 )             29.9       06-15    131<L>  |  96  |  12  ----------------------------<  110<H>  3.4<L>   |  22  |  0.39<L>    Ca    8.2<L>      15 Pierre 2018 06:14  Phos  2.2     06-15  Mg     1.6     06-15    TPro  7.9  /  Alb  2.9<L>  /  TBili  0.9  /  DBili  x   /  AST  200<H>  /  ALT  38  /  AlkPhos  1107<H>  06-14      Magnesium, Serum: 1.6 mg/dL (06-15 @ 06:14)  Phosphorus Level, Serum: 2.2 mg/dL (06-15 @ 06:14) HPI:    68M PMH metastatic neuroendocrine carcinoma w/ pulmonary mets, mediastinal/hilar lymphadenopathy, hepatic mets, diffuse bone mets s/p 6 cycles of carboplatin/etoposide completed 2/2018, and treated with palliative RT to the spine who presents with failure to thrive, ambulatory dysfunction. Of note patient recently admitted for Invokana-associated euglycemic DKA and UTI. Patient seen by his oncologist yesterday, Dr. Yari Dick, recommended readmission for poor appetite, abdominal pain, difficult ambulating and overall poor functional status. Patient also having a cough since his discharge. Patient was tentatively planned to start immunotherapy. Patient's recent CT shows pulmonary, hepatic and osseous metastatic disease. The liver lesions have increased in size compared to prior in April. Patient originally planned for more therapy, however due to functional decline this plan is questioned.     Allergies    No Known Allergies    Intolerances        MEDICATIONS  (STANDING):  ascorbic acid 500 milliGRAM(s) Oral daily  cefepime   IVPB 1000 milliGRAM(s) IV Intermittent every 8 hours  dextrose 5%. 1000 milliLiter(s) (50 mL/Hr) IV Continuous <Continuous>  dextrose 50% Injectable 12.5 Gram(s) IV Push once  dextrose 50% Injectable 25 Gram(s) IV Push once  dextrose 50% Injectable 25 Gram(s) IV Push once  docusate sodium 100 milliGRAM(s) Oral three times a day  enoxaparin Injectable 40 milliGRAM(s) SubCutaneous daily  insulin glargine Injectable (LANTUS) 6 Unit(s) SubCutaneous every morning  insulin lispro (HumaLOG) corrective regimen sliding scale   SubCutaneous three times a day before meals  insulin lispro (HumaLOG) corrective regimen sliding scale   SubCutaneous at bedtime  morphine ER Tablet 15 milliGRAM(s) Oral every 12 hours  multivitamin 1 Tablet(s) Oral daily  polyethylene glycol 3350 17 Gram(s) Oral daily  potassium phosphate IVPB 15 milliMole(s) IV Intermittent once  senna 2 Tablet(s) Oral at bedtime  sodium chloride 0.9%. 1000 milliLiter(s) (150 mL/Hr) IV Continuous <Continuous>  vancomycin  IVPB 1000 milliGRAM(s) IV Intermittent every 12 hours    MEDICATIONS  (PRN):  ALPRAZolam 0.5 milliGRAM(s) Oral every 12 hours PRN anxiety  dextrose 40% Gel 15 Gram(s) Oral once PRN Blood Glucose LESS THAN 70 milliGRAM(s)/deciliter  glucagon  Injectable 1 milliGRAM(s) IntraMuscular once PRN Glucose LESS THAN 70 milligrams/deciliter  ondansetron    Tablet 8 milliGRAM(s) Oral three times a day PRN Nausea and/or Vomiting  oxyCODONE    IR 5 milliGRAM(s) Oral every 4 hours PRN moderate to severe pain (4-10)      PAST MEDICAL & SURGICAL HISTORY:  Neuroendocrine cancer  Hyperlipidemia  Diabetes  HTN (hypertension)  No significant past surgical history      FAMILY HISTORY:  No pertinent family history in first degree relatives      SOCIAL HISTORY: No EtOH, no tobacco    REVIEW OF SYSTEMS:    CONSTITUTIONAL: No fevers or chills  EYES/ENT: No visual changes;  No vertigo or throat pain   NECK: No pain or stiffness  RESPIRATORY: No cough, wheezing, hemoptysis; No shortness of breath  CARDIOVASCULAR: No chest pain or palpitations  GASTROINTESTINAL: No abdominal or epigastric pain. No nausea, vomiting, or hematemesis; No diarrhea or constipation. No melena or hematochezia.  GENITOURINARY: No dysuria, frequency or hematuria  NEUROLOGICAL: No numbness or weakness  SKIN: No itching, burning, rashes, or lesions   All other review of systems is negative unless indicated above.    Height (cm): 157.48 (06-14 @ 20:09)  Weight (kg): 52 (06-14 @ 20:09)  BMI (kg/m2): 21 (06-14 @ 20:09)  BSA (m2): 1.51 (06-14 @ 20:09)    T(F): 98.4 (06-15-18 @ 04:57), Max: 100.8 (06-14-18 @ 15:00)  HR: 110 (06-15-18 @ 04:57)  BP: 120/75 (06-15-18 @ 04:57)  RR: 20 (06-15-18 @ 04:57)  SpO2: 94% (06-15-18 @ 04:57)  Wt(kg): --    GENERAL: chronically ill, appears jaundiced  HEAD:  Atraumatic, Normocephalic  EYES: EOMI, PERRLA, conjunctiva and sclera clear  NECK: Supple, No JVD  CHEST/LUNG: Clear to auscultation bilaterally; No wheeze  HEART: Regular rate and rhythm; No murmurs, rubs, or gallops  ABDOMEN: Soft, Nontender, Nondistended; Bowel sounds present  EXTREMITIES:  2+ Peripheral Pulses, No clubbing, cyanosis, or edema  NEUROLOGY: non-focal  SKIN: No rashes or lesions                          9.6    11.0  )-----------( 180      ( 15 Pierre 2018 06:14 )             29.9       06-15    131<L>  |  96  |  12  ----------------------------<  110<H>  3.4<L>   |  22  |  0.39<L>    Ca    8.2<L>      15 Pierre 2018 06:14  Phos  2.2     06-15  Mg     1.6     06-15    TPro  7.9  /  Alb  2.9<L>  /  TBili  0.9  /  DBili  x   /  AST  200<H>  /  ALT  38  /  AlkPhos  1107<H>  06-14      Magnesium, Serum: 1.6 mg/dL (06-15 @ 06:14)  Phosphorus Level, Serum: 2.2 mg/dL (06-15 @ 06:14)

## 2018-06-15 NOTE — DIETITIAN INITIAL EVALUATION ADULT. - PROBLEM SELECTOR PLAN 3
- + UA, will f/u urine culture (no prior urine cultures)  - c/w cefepime for now to cover for PNA / UTI

## 2018-06-15 NOTE — DIETITIAN INITIAL EVALUATION ADULT. - OTHER INFO
Nutrition consult received for education and stage 2 pressure ulcer. Per previous RD note, pt wt was documented as 112 pounds 1 week ago, current wt is 114 pounds. Pt states he ate breakfast this morning, but unable to provide how much, nursing flowsheet reviewed. No acute GI distress noted at this time. NKFA per previous RD note. Per H&P, pt takes insulin at home, daughter checks fingersticks and reported usual BG around 's but had episode of 600's PTA. Nutrition consult received for education and stage 2 pressure ulcer. Per previous RD note, pt wt was documented as 112 pounds 1 week ago, current wt is 114 pounds. Pt states he ate breakfast this morning, but unable to provide how much, nursing flowsheet reviewed. Per RN, pt consumed about 50% po intake of breakfast today. No acute GI distress noted at this time. No difficulty with chewing/swallowing noted per RN. NKFA per previous RD note. Per H&P, pt takes insulin at home, daughter checks fingersticks and reported usual BG around 's but had episode of 600's PTA.

## 2018-06-15 NOTE — DIETITIAN INITIAL EVALUATION ADULT. - NS AS NUTRI INTERV ED CONTENT
pt confused per chart and defers interview to daughter at this time, diet education inappropriate. RD remains available.

## 2018-06-15 NOTE — CONSULT NOTE ADULT - PROBLEM SELECTOR PROBLEM 1
Pain due to neoplasm
Type 2 diabetes mellitus with hyperglycemia, with long-term current use of insulin
Neuroendocrine cancer

## 2018-06-15 NOTE — PROGRESS NOTE ADULT - SUBJECTIVE AND OBJECTIVE BOX
Patient is a 68y old  Male who presents with a chief complaint of generalized weakness (14 Jun 2018 19:35)      Overnight Events:  No acute events ON. Patient evaluated with aid of  phone.     REVIEW OF SYSTEMS:  CONSTITUTIONAL: No weakness, fevers or chills  EYES/ENT: No visual changes, no throat pain   RESPIRATORY: No cough, wheezing, hemoptysis; No shortness of breath  CARDIOVASCULAR: No chest pain or palpitations  GASTROINTESTINAL: Positive for abdominal pain  GENITOURINARY: No dysuria, frequency or hematuria  NEUROLOGICAL: No dizziness, numbness, or weakness  SKIN: No itching, burning, rashes, or lesions   All other review of systems is negative unless indicated above.    VITAL SIGNS:  Vital Signs Last 24 Hrs  T(C): 37.1 (15 Pierre 2018 12:58), Max: 37.3 (14 Jun 2018 18:46)  T(F): 98.7 (15 Pierre 2018 12:58), Max: 99.2 (14 Jun 2018 18:46)  HR: 110 (15 Pierre 2018 12:58) (110 - 116)  BP: 120/77 (15 Pierre 2018 12:58) (120/75 - 121/79)  BP(mean): --  RR: 20 (15 Pierre 2018 12:58) (20 - 20)  SpO2: 95% (15 Pierre 2018 12:58) (94% - 100%)    PHYSICAL EXAM:   GENERAL: no acute distress  HEENT: NC/AT, EOMI, neck supple, MMM  RESPIRATORY: Diminished breath sounds L>R  CARDIOVASCULAR: RRR, no murmurs, gallops, rubs  ABDOMINAL: normal bowel sounds, firm, tender to palpation on R/LUQ  EXTREMITIES: no clubbing, cyanosis, or edema  NEUROLOGICAL: alert and oriented x 3, non-focal  SKIN: no rashes or lesions   MUSCULOSKELETAL: no gross joint deformity                          9.6    11.0  )-----------( 180      ( 15 Pierre 2018 06:14 )             29.9     06-15    131<L>  |  96  |  12  ----------------------------<  110<H>  3.4<L>   |  22  |  0.39<L>    Ca    8.2<L>      15 Pierre 2018 06:14  Phos  2.2     06-15  Mg     1.6     06-15    TPro  7.9  /  Alb  2.9<L>  /  TBili  0.9  /  DBili  x   /  AST  200<H>  /  ALT  38  /  AlkPhos  1107<H>  06-14        CAPILLARY BLOOD GLUCOSE      POCT Blood Glucose.: 203 mg/dL (15 Pierre 2018 12:51)  POCT Blood Glucose.: 94 mg/dL (15 Pierre 2018 08:39)  POCT Blood Glucose.: 145 mg/dL (14 Jun 2018 23:21)  POCT Blood Glucose.: 251 mg/dL (14 Jun 2018 21:12)    I&O's Summary    14 Jun 2018 07:01  -  15 Pierre 2018 07:00  --------------------------------------------------------  IN: 1200 mL / OUT: 300 mL / NET: 900 mL        MEDICATIONS  (STANDING):  ascorbic acid 500 milliGRAM(s) Oral daily  cefepime   IVPB 1000 milliGRAM(s) IV Intermittent every 8 hours  dextrose 5%. 1000 milliLiter(s) (50 mL/Hr) IV Continuous <Continuous>  dextrose 50% Injectable 12.5 Gram(s) IV Push once  dextrose 50% Injectable 25 Gram(s) IV Push once  dextrose 50% Injectable 25 Gram(s) IV Push once  docusate sodium 100 milliGRAM(s) Oral three times a day  enoxaparin Injectable 40 milliGRAM(s) SubCutaneous daily  HYDROmorphone  Injectable 0.5 milliGRAM(s) IV Push every 4 hours  insulin glargine Injectable (LANTUS) 6 Unit(s) SubCutaneous every morning  insulin lispro (HumaLOG) corrective regimen sliding scale   SubCutaneous three times a day before meals  insulin lispro (HumaLOG) corrective regimen sliding scale   SubCutaneous at bedtime  insulin lispro Injectable (HumaLOG) 2 Unit(s) SubCutaneous three times a day before meals  multivitamin 1 Tablet(s) Oral daily  polyethylene glycol 3350 17 Gram(s) Oral daily  potassium phosphate IVPB 15 milliMole(s) IV Intermittent once  senna 2 Tablet(s) Oral at bedtime  sodium chloride 0.9%. 1000 milliLiter(s) (150 mL/Hr) IV Continuous <Continuous>  vancomycin  IVPB 1000 milliGRAM(s) IV Intermittent every 12 hours Patient is a 68y old  Male who presents with a chief complaint of generalized weakness (14 Jun 2018 19:35)      Overnight Events:  No acute events ON. Patient evaluated with aid of  phone.     REVIEW OF SYSTEMS:  CONSTITUTIONAL: No weakness, fevers or chills  EYES/ENT: No visual changes, no throat pain   RESPIRATORY: No cough, wheezing, hemoptysis; No shortness of breath  CARDIOVASCULAR: No chest pain or palpitations  GASTROINTESTINAL: Positive for abdominal pain  GENITOURINARY: No dysuria, frequency or hematuria  NEUROLOGICAL: No dizziness, numbness, pos general weakness  SKIN: No itching, burning, rashes, or lesions       VITAL SIGNS:  Vital Signs Last 24 Hrs  T(C): 37.1 (15 Pierre 2018 12:58), Max: 37.3 (14 Jun 2018 18:46)  T(F): 98.7 (15 Pierre 2018 12:58), Max: 99.2 (14 Jun 2018 18:46)  HR: 110 (15 Pierre 2018 12:58) (110 - 116)  BP: 120/77 (15 Pierre 2018 12:58) (120/75 - 121/79)  BP(mean): --  RR: 20 (15 Pierre 2018 12:58) (20 - 20)  SpO2: 95% (15 Pierre 2018 12:58) (94% - 100%)    PHYSICAL EXAM:   GENERAL: no acute distress  HEENT: NC/AT, EOMI, neck supple, MMM  RESPIRATORY: Diminished breath sounds L>R  CARDIOVASCULAR: RRR, no murmurs, gallops, rubs  ABDOMINAL: normal bowel sounds, firm, tender to palpation on R/LUQ  EXTREMITIES: no clubbing, cyanosis, or edema  NEUROLOGICAL: alert and oriented x 3, non-focal                            9.6    11.0  )-----------( 180      ( 15 Pierre 2018 06:14 )             29.9     06-15    131<L>  |  96  |  12  ----------------------------<  110<H>  3.4<L>   |  22  |  0.39<L>    Ca    8.2<L>      15 Pierre 2018 06:14  Phos  2.2     06-15  Mg     1.6     06-15    TPro  7.9  /  Alb  2.9<L>  /  TBili  0.9  /  DBili  x   /  AST  200<H>  /  ALT  38  /  AlkPhos  1107<H>  06-14        CAPILLARY BLOOD GLUCOSE      POCT Blood Glucose.: 203 mg/dL (15 Pierre 2018 12:51)  POCT Blood Glucose.: 94 mg/dL (15 Pierre 2018 08:39)  POCT Blood Glucose.: 145 mg/dL (14 Jun 2018 23:21)  POCT Blood Glucose.: 251 mg/dL (14 Jun 2018 21:12)    I&O's Summary    14 Jun 2018 07:01  -  15 Pierre 2018 07:00  --------------------------------------------------------  IN: 1200 mL / OUT: 300 mL / NET: 900 mL        MEDICATIONS  (STANDING):  ascorbic acid 500 milliGRAM(s) Oral daily  cefepime   IVPB 1000 milliGRAM(s) IV Intermittent every 8 hours  dextrose 5%. 1000 milliLiter(s) (50 mL/Hr) IV Continuous <Continuous>  dextrose 50% Injectable 12.5 Gram(s) IV Push once  dextrose 50% Injectable 25 Gram(s) IV Push once  dextrose 50% Injectable 25 Gram(s) IV Push once  docusate sodium 100 milliGRAM(s) Oral three times a day  enoxaparin Injectable 40 milliGRAM(s) SubCutaneous daily  HYDROmorphone  Injectable 0.5 milliGRAM(s) IV Push every 4 hours  insulin glargine Injectable (LANTUS) 6 Unit(s) SubCutaneous every morning  insulin lispro (HumaLOG) corrective regimen sliding scale   SubCutaneous three times a day before meals  insulin lispro (HumaLOG) corrective regimen sliding scale   SubCutaneous at bedtime  insulin lispro Injectable (HumaLOG) 2 Unit(s) SubCutaneous three times a day before meals  multivitamin 1 Tablet(s) Oral daily  polyethylene glycol 3350 17 Gram(s) Oral daily  potassium phosphate IVPB 15 milliMole(s) IV Intermittent once  senna 2 Tablet(s) Oral at bedtime  sodium chloride 0.9%. 1000 milliLiter(s) (150 mL/Hr) IV Continuous <Continuous>  vancomycin  IVPB 1000 milliGRAM(s) IV Intermittent every 12 hours

## 2018-06-15 NOTE — DIETITIAN INITIAL EVALUATION ADULT. - PROBLEM SELECTOR PROBLEM 5
Patient's chart reviewed, please contact to schedule OA colonoscopy with .Osmin Trujillo MD    Screening Criteria  Age: 67 year old Patient meets age criteria.  PCP:  Oli Taylor MD  Personal H/O Colon CA or Polyps: Patient has a personal history of colon polyps (Hyperplastic) on a prior exam.  Family H/O Colon CA: No family history of colon cancer.  GI Symptoms: Yes -  constipation and abdominal pain  Most recent colon procedure Colonoscopy: 11/2008 by Unknown  Concerns for taking prep at home(mobility, etc): No    ALLERGIES:  No Known Allergies    Medications:  Anticoagulation (examples - coumadin, heparin, lovenox, xarelto, pradaxa): No  Platelet Modifying (examples - Plavix, aspirin, nsaids, Aggrenox) : Yes, Aspirin; Hold medication Palvix for 5 days or recommendation from prescribing MD. Pt should contact prescribing MD for recommendation.   Concerns for sedation. On Chronic long acting narcotics, Methadone, Suboxone, antianxiety like xanax, klonazepam: No  Review of other medications indicate - Nitrostat, oral DM medication  BMI: Estimated body mass index is 34.37 kg/(m^2) as calculated from the following:    Height as of 3/13/13: 5' 3\" (1.6 m).    Weight as of an earlier encounter on 5/9/17: 88 kg.:more than 45 No  Is the patient over 300 lbs Weight:   Wt Readings from Last 1 Encounters:   05/09/17 88 kg   :  No  On Oxygen:  No    Past Medical History:  Past Medical History:   Diagnosis Date   • Arthritis    • Cardiac failure congestive    • Coronary artery disease    • Diabetes mellitus    • Essential (primary) hypertension    • Hep C w/o coma, chronic 2006   • Obesity      History of clotting disorder (i.e., VWF or ITP)? No  Diabetes: Yes, patient is a diabetic, oral medication  Respiratory: No, patient does not have any lung problems.  Cardiac: Yes, patient does have cardiac problems: Stents - placed on 2011 and CAD, ASVD  Renal: No  Other Concerns: none noted    Past Surgical History:  Past Surgical  History:   Procedure Laterality Date   • CARDIAC CATHERIZATION  4/10/2011    LCX: 100% OM1  Stented with DARCY   • COLONOSCOPY DIAGNOSTIC  11/26/2008    Hyperplastic polyp   • ESOPHAGOGASTRODUODENOSCOPY TRANSORAL FLEX W/BX SINGLE OR MULT  1/1/2006    Gastritis and Mayorga's   • ESOPHAGOGASTRODUODENOSCOPY TRANSORAL FLEX W/BX SINGLE OR MULT  3//1/2008    Chronic reflux and short segment of Mayorga's   • HB CIRCUMCISION      21 years old       Other Concerns: none noted    Prior Labs: If abnormal creatinine/electrolytes, then will need Nulytely prep  Creatinine   Date Value Ref Range Status   04/06/2017 0.74 0.67 - 1.17 mg/dL Final     BUN   Date Value Ref Range Status   04/06/2017 15 6 - 20 mg/dL Final     Sodium   Date Value Ref Range Status   04/06/2017 134 (L) 135 - 145 mmol/L Final     Potassium   Date Value Ref Range Status   04/06/2017 4.3 3.4 - 5.1 mmol/L Final     Comment:     Slight hemolysis, result may be falsely increased.     Chloride   Date Value Ref Range Status   04/06/2017 98 98 - 107 mmol/L Final     CO2   Date Value Ref Range Status   04/19/2011 30 22 - 33 mmol/L Final     Carbon Dioxide   Date Value Ref Range Status   04/06/2017 26 21 - 32 mmol/L Final     Glucose   Date Value Ref Range Status   04/06/2017 464 (HH) 65 - 99 mg/dL Final     Comment:     CALLED TO, READ BACK AND CONFIRMED  DELMY CLEMONS [id number] [id number]       CALCIUM   Date Value Ref Range Status   04/06/2017 8.5 8.4 - 10.2 mg/dL Final        SCHEDULING:  OK to schedule open access colonoscopy, if no then will need office visit: Yes  Physician Scheduled with: Osmin Trujillo MD  Diagnosis code from O/A order:  Colon Cancer Screening Z12.11  Location: [facility]  Sedation: MAC  Prep: Nulytely  Hold medication Palvix for 5 days or recommendation from prescribing MD. Pt should contact prescribing MD for recommendation.       Neuroendocrine cancer

## 2018-06-15 NOTE — CONSULT NOTE ADULT - SUBJECTIVE AND OBJECTIVE BOX
HPI:  69 y/o M w/ hx of DM2 x "many years". Recent admission for DKA, discharged on Lantus 8 and Prandin 0.5mg qac. Prior to recent hospitalization was on Lantus 15 units at bedtime, Invokana, and Metformin. Has not seen optho in years and does not know if he has retinopathy. Has neuropathy in the feet. No history of nephropathy. Drinks juice, no soda. Has blurry vision, polyuria, polydipsia. Not taking any po at this time. Reports some nausea, but does not have abdominal pain, vomiting. HbA1c was 8.2. Reports recent weight loss. Does not have an endocrinologist, only a PMD. He did not take his lantus this am because he was going to the doctor, usually takes lantus 8 units, prandin 0.5 mg TID.  He denies hypoglycemia, has been checking his blood sugars and they have been 90s-100s, had one reading of 600 yesterday after eating a sandwitch so gave extra lantus.    Also hx of metastatic neuroendocrine carcinoma (unknown primary, diagnosed via iliac bone biopsy, w/ pulmonary mets, mediastinal/hilar lymphadenopathy, hepatic mets, diffuse bone mets, s/p chemo/RT w/ progression of disease), DM2 (recent admission for DKA), HTN presented from [facility] with generalized weakness. Patient was also treated for a UTI w/ ceftriaxone.  He was at [facility] today and daughter c/o decreased appetite, abdominal pain.  Daughter reports he has been intermittently confused but that today he is less confused.  He reports dry cough for the past 2 days since leaving the hospital.  Denies wheezing, fevers, chills, shortness of breath, diarrhea, nausea, vomiting.     In regards to his neuroendocrine tumor, he was diagnosed by bone biopsy November 2017, was treated with carboplatin/etoposide until March 2018 when he was discovered to have POD.  He is planning on starting immunotherapy.       PAST MEDICAL & SURGICAL HISTORY:  Neuroendocrine cancer  Hyperlipidemia  Diabetes Type 2 Ketosis Prone  HTN (hypertension)  No significant past surgical history      FAMILY HISTORY:  DM2 in both parents      Social History:  No cigarette or alcohol use  Lives with wife and children    Outpatient Medications:  · 	Vitamin C 500 mg oral tablet: 1 tab(s) orally once a day , Last Dose Taken:    · 	ALPRAZolam 0.5 mg oral tablet: 1 tab(s) orally every 12 hours x 5 days, As Needed MDD:2 tabs, Last Dose Taken:    · 	oxyCODONE 5 mg oral tablet: 1 tab(s) orally every 4 hours, As Needed MDD:6 tabs, Last Dose Taken:    · 	morphine 15 mg/12 hr oral tablet, extended release: 1 tab(s) orally every 12 hours MDD:2 tabs, Last Dose Taken:    · 	Prandin 0.5 mg oral tablet: 1 tab(s) orally 3 times a day (before meals) Hold if you are not eating , Last Dose Taken:    · 	insulin glargine: 8 unit(s) subcutaneous once a day at 10 am , Last Dose Taken:    · 	Zofran 8 mg oral tablet: 1 tab(s) orally 3 times a day, As Needed, Last Dose Taken:    · 	Reglan 10 mg oral tablet: 1 tab(s) orally 4 times a day (before meals and at bedtime), As Needed, Last Dose Taken:    · 	MiraLax oral powder for reconstitution: orally once a day, As Needed - for constipation, Last Dose Taken:    · 	docusate sodium 100 mg oral capsule: 1 cap(s) orally 2 times a day, Last Dose Taken:      MEDICATIONS  (STANDING):  ascorbic acid 500 milliGRAM(s) Oral daily  cefepime   IVPB 1000 milliGRAM(s) IV Intermittent every 8 hours  dextrose 5%. 1000 milliLiter(s) (50 mL/Hr) IV Continuous <Continuous>  dextrose 50% Injectable 12.5 Gram(s) IV Push once  dextrose 50% Injectable 25 Gram(s) IV Push once  dextrose 50% Injectable 25 Gram(s) IV Push once  docusate sodium 100 milliGRAM(s) Oral three times a day  enoxaparin Injectable 40 milliGRAM(s) SubCutaneous daily  HYDROmorphone  Injectable 0.5 milliGRAM(s) IV Push every 4 hours  insulin glargine Injectable (LANTUS) 6 Unit(s) SubCutaneous every morning  insulin lispro (HumaLOG) corrective regimen sliding scale   SubCutaneous three times a day before meals  insulin lispro (HumaLOG) corrective regimen sliding scale   SubCutaneous at bedtime  insulin lispro Injectable (HumaLOG) 2 Unit(s) SubCutaneous three times a day before meals  multivitamin 1 Tablet(s) Oral daily  polyethylene glycol 3350 17 Gram(s) Oral daily  potassium phosphate IVPB 15 milliMole(s) IV Intermittent once  senna 2 Tablet(s) Oral at bedtime  sodium chloride 0.9%. 1000 milliLiter(s) (150 mL/Hr) IV Continuous <Continuous>  vancomycin  IVPB 1000 milliGRAM(s) IV Intermittent every 12 hours    MEDICATIONS  (PRN):  ALPRAZolam 0.5 milliGRAM(s) Oral every 12 hours PRN anxiety  dextrose 40% Gel 15 Gram(s) Oral once PRN Blood Glucose LESS THAN 70 milliGRAM(s)/deciliter  glucagon  Injectable 1 milliGRAM(s) IntraMuscular once PRN Glucose LESS THAN 70 milligrams/deciliter  HYDROmorphone  Injectable 0.5 milliGRAM(s) IV Push every 4 hours PRN Severe Breakthrough pain  ondansetron    Tablet 8 milliGRAM(s) Oral three times a day PRN Nausea and/or Vomiting      Allergies    No Known Allergies    Intolerances      Review of Systems: Unable to obtain fully at this time as patient not answering all questions fully.       PHYSICAL EXAM:  VITALS: T(C): 37.1 (06-15-18 @ 12:58)  T(F): 98.7 (06-15-18 @ 12:58), Max: 99.2 (06-14-18 @ 18:46)  HR: 110 (06-15-18 @ 12:58) (110 - 116)  BP: 120/77 (06-15-18 @ 12:58) (120/75 - 121/79)  RR:  (20 - 20)  SpO2:  (94% - 100%)  Wt(kg): --  GENERAL: NAD at this time, resting comfortably  EYES: No proptosis, EOMI  HEENT:  Atraumatic, Normocephalic,   THYROID: Normal size, no palpable nodules  RESPIRATORY: full excursion, non-labored  CARDIOVASCULAR: Regular rhythm; No murmurs; no peripheral edema  GI: Soft, non distended, normal bowel sounds  SKIN: Dry, intact, No rashes or lesions  PSYCH:  normal affect, normal mood  CUSHING'S SIGNS: no striae      POCT Blood Glucose.: 203 mg/dL (06-15-18 @ 12:51)  POCT Blood Glucose.: 94 mg/dL (06-15-18 @ 08:39)  POCT Blood Glucose.: 145 mg/dL (06-14-18 @ 23:21)  POCT Blood Glucose.: 251 mg/dL (06-14-18 @ 21:12)  POCT Blood Glucose.: 249 mg/dL (06-14-18 @ 15:27)                              9.6    11.0  )-----------( 180      ( 15 Pierre 2018 06:14 )             29.9       06-15    131<L>  |  96  |  12  ----------------------------<  110<H>  3.4<L>   |  22  |  0.39<L>    EGFR if : 145  EGFR if non : 125    Ca    8.2<L>      06-15  Mg     1.6     06-15  Phos  2.2     06-15    TPro  7.9  /  Alb  2.9<L>  /  TBili  0.9  /  DBili  x   /  AST  200<H>  /  ALT  38  /  AlkPhos  1107<H>  06-14    Thyroid Function Tests:  06-09 @ 08:24 TSH 1.90 FreeT4 -- T3 -- Anti TPO -- Anti Thyroglobulin Ab -- TSI --      Hemoglobin A1C, Whole Blood: 8.2 % <H> [4.0 - 5.6] (06-07-18 @ 03:08)      06-09 Chol 94 LDL 46 HDL 17<L> Trig 156<H>  Radiology:

## 2018-06-15 NOTE — CHART NOTE - NSCHARTNOTEFT_GEN_A_CORE
Upon Nutritional Assessment by the Registered Dietitian your patient was determined to meet criteria / has evidence of the following diagnosis/diagnoses:          [ ]  Mild Protein Calorie Malnutrition        [ ]  Moderate Protein Calorie Malnutrition        [x ] Severe Protein Calorie Malnutrition        [ ] Unspecified Protein Calorie Malnutrition        [ ] Underweight / BMI <19        [ ] Morbid Obesity / BMI > 40      Findings as based on:  [x ] Comprehensive nutrition assessment   [ ] Nutrition Focused Physical Exam  [x ] Other: pt with metastatic cancer, stage 2 sacrum PU, cachectic appearance, BMI 17.8    Nutrition Plan/Recommendations:   Recommend to continue with current Consistent Carbohydrate diet with Glucerna 2 x day to provide additional nutrients; Vitamin: C; Multivitamin/mineral; for pressure ulcer        PROVIDER Section:     By signing this assessment you are acknowledging and agree with the diagnosis/diagnoses assigned by the Registered Dietitian    Comments:

## 2018-06-16 DIAGNOSIS — J90 PLEURAL EFFUSION, NOT ELSEWHERE CLASSIFIED: ICD-10-CM

## 2018-06-16 DIAGNOSIS — N17.9 ACUTE KIDNEY FAILURE, UNSPECIFIED: ICD-10-CM

## 2018-06-16 LAB
ANION GAP SERPL CALC-SCNC: 10 MMOL/L — SIGNIFICANT CHANGE UP (ref 5–17)
ANION GAP SERPL CALC-SCNC: 12 MMOL/L — SIGNIFICANT CHANGE UP (ref 5–17)
BUN SERPL-MCNC: 13 MG/DL — SIGNIFICANT CHANGE UP (ref 7–23)
BUN SERPL-MCNC: 21 MG/DL — SIGNIFICANT CHANGE UP (ref 7–23)
CALCIUM SERPL-MCNC: 8.2 MG/DL — LOW (ref 8.4–10.5)
CALCIUM SERPL-MCNC: 8.2 MG/DL — LOW (ref 8.4–10.5)
CHLORIDE SERPL-SCNC: 103 MMOL/L — SIGNIFICANT CHANGE UP (ref 96–108)
CHLORIDE SERPL-SCNC: 94 MMOL/L — LOW (ref 96–108)
CO2 SERPL-SCNC: 21 MMOL/L — LOW (ref 22–31)
CO2 SERPL-SCNC: 25 MMOL/L — SIGNIFICANT CHANGE UP (ref 22–31)
CREAT ?TM UR-MCNC: 49 MG/DL — SIGNIFICANT CHANGE UP
CREAT SERPL-MCNC: 0.45 MG/DL — LOW (ref 0.5–1.3)
CREAT SERPL-MCNC: 1.47 MG/DL — HIGH (ref 0.5–1.3)
GLUCOSE BLDC GLUCOMTR-MCNC: 162 MG/DL — HIGH (ref 70–99)
GLUCOSE BLDC GLUCOMTR-MCNC: 175 MG/DL — HIGH (ref 70–99)
GLUCOSE BLDC GLUCOMTR-MCNC: 185 MG/DL — HIGH (ref 70–99)
GLUCOSE BLDC GLUCOMTR-MCNC: 192 MG/DL — HIGH (ref 70–99)
GLUCOSE BLDC GLUCOMTR-MCNC: 200 MG/DL — HIGH (ref 70–99)
GLUCOSE SERPL-MCNC: 116 MG/DL — HIGH (ref 70–99)
GLUCOSE SERPL-MCNC: 208 MG/DL — HIGH (ref 70–99)
HCT VFR BLD CALC: 27.4 % — LOW (ref 39–50)
HCT VFR BLD CALC: 30 % — LOW (ref 39–50)
HGB BLD-MCNC: 10 G/DL — LOW (ref 13–17)
HGB BLD-MCNC: 8.8 G/DL — LOW (ref 13–17)
MAGNESIUM SERPL-MCNC: 2 MG/DL — SIGNIFICANT CHANGE UP (ref 1.6–2.6)
MCHC RBC-ENTMCNC: 24.3 PG — LOW (ref 27–34)
MCHC RBC-ENTMCNC: 28.2 PG — SIGNIFICANT CHANGE UP (ref 27–34)
MCHC RBC-ENTMCNC: 32 GM/DL — SIGNIFICANT CHANGE UP (ref 32–36)
MCHC RBC-ENTMCNC: 33.1 GM/DL — SIGNIFICANT CHANGE UP (ref 32–36)
MCV RBC AUTO: 76 FL — LOW (ref 80–100)
MCV RBC AUTO: 85 FL — SIGNIFICANT CHANGE UP (ref 80–100)
PHOSPHATE SERPL-MCNC: 2.8 MG/DL — SIGNIFICANT CHANGE UP (ref 2.5–4.5)
PLATELET # BLD AUTO: 222 K/UL — SIGNIFICANT CHANGE UP (ref 150–400)
PLATELET # BLD AUTO: 283 K/UL — SIGNIFICANT CHANGE UP (ref 150–400)
POTASSIUM SERPL-MCNC: 3.9 MMOL/L — SIGNIFICANT CHANGE UP (ref 3.5–5.3)
POTASSIUM SERPL-MCNC: 4 MMOL/L — SIGNIFICANT CHANGE UP (ref 3.5–5.3)
POTASSIUM SERPL-SCNC: 3.9 MMOL/L — SIGNIFICANT CHANGE UP (ref 3.5–5.3)
POTASSIUM SERPL-SCNC: 4 MMOL/L — SIGNIFICANT CHANGE UP (ref 3.5–5.3)
RBC # BLD: 3.53 M/UL — LOW (ref 4.2–5.8)
RBC # BLD: 3.61 M/UL — LOW (ref 4.2–5.8)
RBC # FLD: 15 % — HIGH (ref 10.3–14.5)
RBC # FLD: 17.1 % — HIGH (ref 10.3–14.5)
SODIUM SERPL-SCNC: 131 MMOL/L — LOW (ref 135–145)
SODIUM SERPL-SCNC: 134 MMOL/L — LOW (ref 135–145)
SODIUM UR-SCNC: 39 MMOL/L — SIGNIFICANT CHANGE UP
VANCOMYCIN TROUGH SERPL-MCNC: 7.7 UG/ML — LOW (ref 10–20)
WBC # BLD: 12 K/UL — HIGH (ref 3.8–10.5)
WBC # BLD: 12.9 K/UL — HIGH (ref 3.8–10.5)
WBC # FLD AUTO: 12 K/UL — HIGH (ref 3.8–10.5)
WBC # FLD AUTO: 12.9 K/UL — HIGH (ref 3.8–10.5)

## 2018-06-16 PROCEDURE — 99233 SBSQ HOSP IP/OBS HIGH 50: CPT | Mod: GC

## 2018-06-16 PROCEDURE — 71250 CT THORAX DX C-: CPT | Mod: 26

## 2018-06-16 RX ORDER — ONDANSETRON 8 MG/1
8 TABLET, FILM COATED ORAL EVERY 8 HOURS
Qty: 0 | Refills: 0 | Status: DISCONTINUED | OUTPATIENT
Start: 2018-06-16 | End: 2018-06-22

## 2018-06-16 RX ORDER — INSULIN LISPRO 100/ML
2 VIAL (ML) SUBCUTANEOUS
Qty: 0 | Refills: 0 | Status: DISCONTINUED | OUTPATIENT
Start: 2018-06-16 | End: 2018-06-16

## 2018-06-16 RX ORDER — INSULIN LISPRO 100/ML
3 VIAL (ML) SUBCUTANEOUS
Qty: 0 | Refills: 0 | Status: DISCONTINUED | OUTPATIENT
Start: 2018-06-16 | End: 2018-06-16

## 2018-06-16 RX ORDER — SODIUM CHLORIDE 9 MG/ML
1000 INJECTION, SOLUTION INTRAVENOUS
Qty: 0 | Refills: 0 | Status: DISCONTINUED | OUTPATIENT
Start: 2018-06-16 | End: 2018-06-16

## 2018-06-16 RX ORDER — VANCOMYCIN HCL 1 G
750 VIAL (EA) INTRAVENOUS EVERY 12 HOURS
Qty: 0 | Refills: 0 | Status: DISCONTINUED | OUTPATIENT
Start: 2018-06-16 | End: 2018-06-17

## 2018-06-16 RX ORDER — INSULIN LISPRO 100/ML
2 VIAL (ML) SUBCUTANEOUS
Qty: 0 | Refills: 0 | Status: DISCONTINUED | OUTPATIENT
Start: 2018-06-16 | End: 2018-06-19

## 2018-06-16 RX ORDER — ACETAMINOPHEN 500 MG
650 TABLET ORAL EVERY 6 HOURS
Qty: 0 | Refills: 0 | Status: DISCONTINUED | OUTPATIENT
Start: 2018-06-16 | End: 2018-06-22

## 2018-06-16 RX ORDER — INSULIN LISPRO 100/ML
1 VIAL (ML) SUBCUTANEOUS
Qty: 0 | Refills: 0 | Status: DISCONTINUED | OUTPATIENT
Start: 2018-06-16 | End: 2018-06-19

## 2018-06-16 RX ORDER — CEFEPIME 1 G/1
1000 INJECTION, POWDER, FOR SOLUTION INTRAMUSCULAR; INTRAVENOUS EVERY 12 HOURS
Qty: 0 | Refills: 0 | Status: DISCONTINUED | OUTPATIENT
Start: 2018-06-16 | End: 2018-06-17

## 2018-06-16 RX ORDER — INSULIN LISPRO 100/ML
3 VIAL (ML) SUBCUTANEOUS
Qty: 0 | Refills: 0 | Status: DISCONTINUED | OUTPATIENT
Start: 2018-06-16 | End: 2018-06-18

## 2018-06-16 RX ORDER — ENOXAPARIN SODIUM 100 MG/ML
30 INJECTION SUBCUTANEOUS DAILY
Qty: 0 | Refills: 0 | Status: DISCONTINUED | OUTPATIENT
Start: 2018-06-16 | End: 2018-06-18

## 2018-06-16 RX ADMIN — ONDANSETRON 8 MILLIGRAM(S): 8 TABLET, FILM COATED ORAL at 21:47

## 2018-06-16 RX ADMIN — Medication 250 MILLIGRAM(S): at 00:24

## 2018-06-16 RX ADMIN — SENNA PLUS 2 TABLET(S): 8.6 TABLET ORAL at 21:46

## 2018-06-16 RX ADMIN — HYDROMORPHONE HYDROCHLORIDE 0.2 MILLIGRAM(S): 2 INJECTION INTRAMUSCULAR; INTRAVENOUS; SUBCUTANEOUS at 22:45

## 2018-06-16 RX ADMIN — Medication 1: at 13:48

## 2018-06-16 RX ADMIN — Medication 2 UNIT(S): at 09:06

## 2018-06-16 RX ADMIN — HYDROMORPHONE HYDROCHLORIDE 0.2 MILLIGRAM(S): 2 INJECTION INTRAMUSCULAR; INTRAVENOUS; SUBCUTANEOUS at 13:57

## 2018-06-16 RX ADMIN — CEFEPIME 100 MILLIGRAM(S): 1 INJECTION, POWDER, FOR SOLUTION INTRAMUSCULAR; INTRAVENOUS at 18:19

## 2018-06-16 RX ADMIN — Medication 250 MILLIGRAM(S): at 23:04

## 2018-06-16 RX ADMIN — Medication 100 MILLIGRAM(S): at 21:47

## 2018-06-16 RX ADMIN — CEFEPIME 100 MILLIGRAM(S): 1 INJECTION, POWDER, FOR SOLUTION INTRAMUSCULAR; INTRAVENOUS at 05:37

## 2018-06-16 RX ADMIN — Medication 1: at 09:06

## 2018-06-16 RX ADMIN — INSULIN GLARGINE 6 UNIT(S): 100 INJECTION, SOLUTION SUBCUTANEOUS at 09:05

## 2018-06-16 RX ADMIN — HYDROMORPHONE HYDROCHLORIDE 0.2 MILLIGRAM(S): 2 INJECTION INTRAMUSCULAR; INTRAVENOUS; SUBCUTANEOUS at 05:34

## 2018-06-16 RX ADMIN — ENOXAPARIN SODIUM 30 MILLIGRAM(S): 100 INJECTION SUBCUTANEOUS at 13:48

## 2018-06-16 RX ADMIN — Medication 1 UNIT(S): at 18:20

## 2018-06-16 RX ADMIN — Medication 650 MILLIGRAM(S): at 14:05

## 2018-06-16 RX ADMIN — HYDROMORPHONE HYDROCHLORIDE 0.2 MILLIGRAM(S): 2 INJECTION INTRAMUSCULAR; INTRAVENOUS; SUBCUTANEOUS at 14:20

## 2018-06-16 RX ADMIN — SODIUM CHLORIDE 75 MILLILITER(S): 9 INJECTION, SOLUTION INTRAVENOUS at 13:57

## 2018-06-16 RX ADMIN — Medication 100 MILLIGRAM(S): at 05:36

## 2018-06-16 RX ADMIN — HYDROMORPHONE HYDROCHLORIDE 0.2 MILLIGRAM(S): 2 INJECTION INTRAMUSCULAR; INTRAVENOUS; SUBCUTANEOUS at 21:46

## 2018-06-16 RX ADMIN — Medication 2 UNIT(S): at 13:48

## 2018-06-16 RX ADMIN — HYDROMORPHONE HYDROCHLORIDE 0.2 MILLIGRAM(S): 2 INJECTION INTRAMUSCULAR; INTRAVENOUS; SUBCUTANEOUS at 16:06

## 2018-06-16 RX ADMIN — HYDROMORPHONE HYDROCHLORIDE 0.2 MILLIGRAM(S): 2 INJECTION INTRAMUSCULAR; INTRAVENOUS; SUBCUTANEOUS at 18:21

## 2018-06-16 RX ADMIN — Medication 1: at 18:20

## 2018-06-16 RX ADMIN — Medication 500 MILLIGRAM(S): at 13:48

## 2018-06-16 RX ADMIN — Medication 1 TABLET(S): at 13:48

## 2018-06-16 NOTE — PROVIDER CONTACT NOTE (OTHER) - RECOMMENDATIONS
Patient given apple juice then rechecked 81. PATIENT GIVEN DEXTROSE 25ML; Bloodsugar increased to 253.

## 2018-06-16 NOTE — PROGRESS NOTE ADULT - PROBLEM SELECTOR PLAN 4
-Cr increased from 0.44 to 1.47  -Fena <1%, consistent with pre-renal etiology  -LR @75 cc/hr x 12 hours  -Renally dosing medications  -avoid nephrotoxins

## 2018-06-16 NOTE — PROGRESS NOTE ADULT - PROBLEM SELECTOR PLAN 1
well controlled on IV regimen.  As patient is improving clinically, will continue to assess for return to oral regimen

## 2018-06-16 NOTE — PROGRESS NOTE ADULT - PROBLEM SELECTOR PLAN 3
-CT chest non-contrast shows New small right and trace left pleural effusions associated with partial passive atelectasis of the RLL. There is underlying pleural nodularity of the left pleural effusion, suggesting a malignant effusion.   -Pt doing saturating well on RA, not endorsing SOB  -Will continue to monitor for now; if patient's respiratory status worsens, will obtain pulmonary consult for possible thoracentesis  -No signs of PNA on CT, as above

## 2018-06-16 NOTE — PROGRESS NOTE ADULT - SUBJECTIVE AND OBJECTIVE BOX
HPI:  68M PMH metastatic neuroendocrine carcinoma (unknown primary, diagnosed via iliac bone biopsy, w/ pulmonary mets, mediastinal/hilar lymphadenopathy, hepatic mets, diffuse bone mets, s/p chemo/RT w/ progression of disease), DM2 ), HTN admitted for sepsis 2/2 PNA vs. UTI.   Follow-up for pain management.      PERTINENT PMH REVIEWED:  [x ] YES [ ] NO           SOCIAL HISTORY:   Significant other/partner:               Children:                   Voodoo/Spirituality:  Substance hx:  [ ] YES   [x] NO                   Tobacco hx:  [ ] YES  [ ] NO                       Alcohol hx: [ ] YES  [ ] NO         Home Opioid hx:  [ ] YES  [x ] NO   Living Situation: [ ] Home  [ ] Long term care  [ ] Rehab [ ] Other  As per case coordination notes:  "Pt lives in a private home with 4  steps to enter with his wife and his daughter, Shanel, that he names as  caregiver. Her phone is [phone] and she provides physical care to patient  as well. He uses no DME."  FAMILY HISTORY:  No pertinent family history in first degree relatives    [ ] Family history non-contributory     BASELINE (I)ADLs (prior to admission):  [place]: [ ] total  [x ] moderate [ ] dependent    ADVANCE DIRECTIVES:    MEDICATIONS  (STANDING):  ascorbic acid 500 milliGRAM(s) Oral daily  cefepime   IVPB 1000 milliGRAM(s) IV Intermittent every 12 hours  dextrose 5%. 1000 milliLiter(s) (50 mL/Hr) IV Continuous <Continuous>  dextrose 50% Injectable 12.5 Gram(s) IV Push once  dextrose 50% Injectable 25 Gram(s) IV Push once  dextrose 50% Injectable 25 Gram(s) IV Push once  docusate sodium 100 milliGRAM(s) Oral three times a day  enoxaparin Injectable 30 milliGRAM(s) SubCutaneous daily  HYDROmorphone  Injectable 0.2 milliGRAM(s) IV Push every 4 hours  insulin glargine Injectable (LANTUS) 6 Unit(s) SubCutaneous every morning  insulin lispro (HumaLOG) corrective regimen sliding scale   SubCutaneous three times a day before meals  insulin lispro (HumaLOG) corrective regimen sliding scale   SubCutaneous at bedtime  insulin lispro Injectable (HumaLOG) 2 Unit(s) SubCutaneous before lunch  insulin lispro Injectable (HumaLOG) 2 Unit(s) SubCutaneous before breakfast  insulin lispro Injectable (HumaLOG) 1 Unit(s) SubCutaneous before dinner  lactated ringers. 1000 milliLiter(s) (75 mL/Hr) IV Continuous <Continuous>  multivitamin 1 Tablet(s) Oral daily  ondansetron Injectable 8 milliGRAM(s) IV Push every 8 hours  polyethylene glycol 3350 17 Gram(s) Oral daily  senna 2 Tablet(s) Oral at bedtime  sodium chloride 0.9%. 1000 milliLiter(s) (150 mL/Hr) IV Continuous <Continuous>    MEDICATIONS  (PRN):  ALPRAZolam 0.5 milliGRAM(s) Oral every 12 hours PRN anxiety  dextrose 40% Gel 15 Gram(s) Oral once PRN Blood Glucose LESS THAN 70 milliGRAM(s)/deciliter  glucagon  Injectable 1 milliGRAM(s) IntraMuscular once PRN Glucose LESS THAN 70 milligrams/deciliter  HYDROmorphone  Injectable 0.2 milliGRAM(s) IV Push every 3 hours PRN Breakthrough pain  naloxone Injectable 0.1 milliGRAM(s) IV Push every 3 minutes PRN sedation or respiratory depression due to opioids      DNR [ ] YES [x ] NO                            [ ] Completed  MOLST  [ ] YES [ x] NO                      [ ] Completed  Health Care Proxy [ ] YES  [x ] NO   [ ] Completed  Living Will  [ ] YES [x ] NO             [ x] Surrogate Spouse Reyna Hyman  Wife          Phone#:  see EMR    Allergies    No Known Allergies    Intolerances            PRESENT SYMPTOMS:  Source: [ x] Patient   [ ] Family   [x ] Team     Pain:                        [ ] No [ x] Yes             [ x] Mild [ x] Moderate [ ] Severe. Pain AD 0    Onset -  Location -  Duration -  Character -  Alleviating/Aggravating -  Radiation -  Timing -      Dyspnea:                [ ] No [ ] Yes             [ ] Mild [ ] Moderate [ ] Severe    Anxiety:                  [ ] No [ ] Yes             [ ] Mild [ ] Moderate [ ] Severe    Fatigue:                  [ ] No [ ] Yes             [ ] Mild [ ] Moderate [ ] Severe    Nausea:                  [ ] No [ ] Yes             [ ] Mild [ ] Moderate [ ] Severe    Loss of appetite:   [ ] No [ ] Yes             [ ] Mild [ ] Moderate [ ] Severe    Constipation:        [ ] No [ ] Yes             [ ] Mild [ ] Moderate [ ] Severe    Other Symptoms:  [x ] All other review of systems negative   [  ] Unable to obtain due to poor mentation     Karnofsky Performance Score/Palliative Performance Status Version 2: 40        %    PHYSICAL EXAM:  Vital Signs Last 24 Hrs  T(C): 36.8 (16 Jun 2018 05:19), Max: 37.1 (15 Pierre 2018 12:58)  T(F): 98.3 (16 Jun 2018 05:19), Max: 98.7 (15 Pierre 2018 12:58)  HR: 116 (16 Jun 2018 05:19) (93 - 116)  BP: 114/72 (16 Jun 2018 05:19) (114/72 - 148/84)  BP(mean): --  RR: 20 (16 Jun 2018 05:19) (20 - 20)  SpO2: 95% (16 Jun 2018 05:19) (93% - 95%)      General:  [ x ] Ill appearing male.  Alert, sitting  up in bed, eating breakfast. Cachexia     HEENT:  [x ] Normal   [ ] Dry mouth   [ ] ET Tube    [ ] Trach  [ ] Oral lesions    Lungs:   [x ] Clear [ ] Tachypnea  [ ] Audible excessive secretions   [ ] Rhonchi        [ ] Right [ ] Left [ ] Bilateral  [ ] Crackles        [ ] Right [ ] Left [ ] Bilateral  [ ] Wheezing     [ ] Right [ ] Left [ ] Bilateral    Cardiovascular:  S1, S2. No S3. No murmurs     Abdomen: [x ] Soft  [ ] Distended   [ ] +BS  [x ] Non tender [ ] Tender  [ ]PEG   [ ]OGT/ NGT   Last BM:   No documented bm since he was admitted.     Genitourinary: [ ] Normal [x ] Incontinent   [ ] Oliguria/Anuria   [ ] Moore    Musculoskeletal:  [ ] Normal   [x ] Weakness  [ ] Bedbound/Wheelchair bound [ ] Edema [x] Sarcopenia     Neurological: [ ] No focal deficits  [ ] Cognitive impairment  [ ] Dysphagia [ ] Dysarthria [ ] Paresis [  ] Other:   Skin: [ x] Normal   [ ] Pressure ulcer(s)                  [ ] Rash    LABS:             Reviewed      Shock: [ ] Septic [ ] Cardiogenic [ ] Neurologic [ ] Hypovolemic  Vasopressors x   Inotrophs x     Protein Calorie Malnutrition: [ ] Mild [ ] Moderate [ ] Severe    Oral Intake: [ ] Unable/mouth care only [ ] Minimal [ ] Moderate [ ] Full Capability  Diet: [ ] NPO [ ] Tube feeds [ ] TPN [ ] Other     RADIOLOGY & ADDITIONAL STUDIES: reviewed  Reviewed.   REFERRALS:   [ ] Chaplaincy  [ ] Hospice  [ ] Child Life  [ ] Social Work  [ ] Case management [ ] Holistic Therapy

## 2018-06-16 NOTE — PROGRESS NOTE ADULT - ASSESSMENT
68M PMH metastatic neuroendocrine carcinoma (unknown primary, diagnosed via iliac bone biopsy, w/ pulmonary mets, mediastinal/hilar lymphadenopathy, hepatic mets, diffuse bone mets, s/p chemo/RT w/ progression of disease), DM2 (recent admission for DKA), HTN presents from [facility] with generalized weakness found to have sepsis in likely setting of UTI; CT negative for PNA.

## 2018-06-16 NOTE — PROGRESS NOTE ADULT - SUBJECTIVE AND OBJECTIVE BOX
Patient is a 68y old  Male who presents with a chief complaint of generalized weakness (14 Jun 2018 19:35)      Overnight Events:  No acute events ON.     REVIEW OF SYSTEMS:  CONSTITUTIONAL: No weakness, fevers or chills  EYES/ENT: No visual changes, no throat pain   RESPIRATORY: No cough, wheezing, hemoptysis; No shortness of breath  CARDIOVASCULAR: No chest pain or palpitations  GASTROINTESTINAL: No abdominal, nausea, vomiting, or hematemesis; No diarrhea or constipation. No melena or hematochezia.  GENITOURINARY: No dysuria, frequency or hematuria  NEUROLOGICAL: No dizziness, numbness, or weakness  SKIN: No itching, burning, rashes, or lesions   All other review of systems is negative unless indicated above.    VITAL SIGNS:  Vital Signs Last 24 Hrs  T(C): 36.8 (16 Jun 2018 05:19), Max: 37.1 (15 Pierre 2018 12:58)  T(F): 98.3 (16 Jun 2018 05:19), Max: 98.7 (15 Pierre 2018 12:58)  HR: 116 (16 Jun 2018 05:19) (93 - 116)  BP: 114/72 (16 Jun 2018 05:19) (114/72 - 148/84)  BP(mean): --  RR: 20 (16 Jun 2018 05:19) (20 - 20)  SpO2: 95% (16 Jun 2018 05:19) (93% - 95%)    PHYSICAL EXAM:   GENERAL: no acute distress  HEENT: NC/AT, EOMI, neck supple, MMM  RESPIRATORY: Diminished at bases, R>L  CARDIOVASCULAR: RRR, no murmurs, gallops, rubs  ABDOMINAL: firm, non-tender to palpation,   EXTREMITIES: no clubbing, cyanosis, or edema  NEUROLOGICAL: non-focal  SKIN: no rashes or lesions   MUSCULOSKELETAL: no gross joint deformity                          8.8    12.9  )-----------( 283      ( 16 Jun 2018 07:25 )             27.4     06-16    134<L>  |  103  |  21  ----------------------------<  116<H>  4.0   |  21<L>  |  1.47<H>    Ca    8.2<L>      16 Jun 2018 07:25  Phos  2.8     06-16  Mg     2.0     06-16    TPro  7.9  /  Alb  2.9<L>  /  TBili  0.9  /  DBili  x   /  AST  200<H>  /  ALT  38  /  AlkPhos  1107<H>  06-14        CAPILLARY BLOOD GLUCOSE      POCT Blood Glucose.: 175 mg/dL (16 Jun 2018 08:53)  POCT Blood Glucose.: 185 mg/dL (16 Jun 2018 00:52)  POCT Blood Glucose.: 253 mg/dL (15 Pierre 2018 22:22)  POCT Blood Glucose.: 86 mg/dL (15 Pierre 2018 21:50)  POCT Blood Glucose.: 76 mg/dL (15 Pierre 2018 21:27)  POCT Blood Glucose.: 257 mg/dL (15 Pierre 2018 17:31)  POCT Blood Glucose.: 203 mg/dL (15 Pierre 2018 12:51)    I&O's Summary      MEDICATIONS  (STANDING):  ascorbic acid 500 milliGRAM(s) Oral daily  cefepime   IVPB 1000 milliGRAM(s) IV Intermittent every 12 hours  dextrose 5%. 1000 milliLiter(s) (50 mL/Hr) IV Continuous <Continuous>  dextrose 50% Injectable 12.5 Gram(s) IV Push once  dextrose 50% Injectable 25 Gram(s) IV Push once  dextrose 50% Injectable 25 Gram(s) IV Push once  docusate sodium 100 milliGRAM(s) Oral three times a day  enoxaparin Injectable 30 milliGRAM(s) SubCutaneous daily  HYDROmorphone  Injectable 0.2 milliGRAM(s) IV Push every 4 hours  insulin glargine Injectable (LANTUS) 6 Unit(s) SubCutaneous every morning  insulin lispro (HumaLOG) corrective regimen sliding scale   SubCutaneous three times a day before meals  insulin lispro (HumaLOG) corrective regimen sliding scale   SubCutaneous at bedtime  insulin lispro Injectable (HumaLOG) 2 Unit(s) SubCutaneous before lunch  insulin lispro Injectable (HumaLOG) 2 Unit(s) SubCutaneous before breakfast  insulin lispro Injectable (HumaLOG) 1 Unit(s) SubCutaneous before dinner  lactated ringers. 1000 milliLiter(s) (75 mL/Hr) IV Continuous <Continuous>  multivitamin 1 Tablet(s) Oral daily  ondansetron Injectable 8 milliGRAM(s) IV Push every 8 hours  polyethylene glycol 3350 17 Gram(s) Oral daily  senna 2 Tablet(s) Oral at bedtime  sodium chloride 0.9%. 1000 milliLiter(s) (150 mL/Hr) IV Continuous <Continuous>

## 2018-06-16 NOTE — PROGRESS NOTE ADULT - ASSESSMENT
68M PMH metastatic neuroendocrine carcinoma (unknown primary, diagnosed via iliac bone biopsy, w/ pulmonary mets, mediastinal/hilar lymphadenopathy, hepatic mets, diffuse bone mets, s/p chemo/RT w/ progression of disease), DM2 (recent admission for DKA), HTN presents from [facility] with generalized weakness with sepsis 2/2 PNA vs. UTI.    Follow-up for pain management.

## 2018-06-16 NOTE — PROGRESS NOTE ADULT - PROBLEM SELECTOR PLAN 1
-Resolved   - patient met sepsis criteria on admission w/ fevers, leukocytosis, tachycardia, tachypnea   - Pt with cough on admission; he was treated for HAP in light of recent admission with Vanco and Cefepime; however, CT chest shows no evidence of PNA  -RVP negative  -Patient also with +u/a; possible UTI was covered with Cefepime

## 2018-06-17 LAB
ALBUMIN SERPL ELPH-MCNC: 2.3 G/DL — LOW (ref 3.3–5)
ALP SERPL-CCNC: 944 U/L — HIGH (ref 40–120)
ALT FLD-CCNC: 27 U/L — SIGNIFICANT CHANGE UP (ref 10–45)
ANION GAP SERPL CALC-SCNC: 18 MMOL/L — HIGH (ref 5–17)
AST SERPL-CCNC: 108 U/L — HIGH (ref 10–40)
BILIRUB SERPL-MCNC: 1.2 MG/DL — SIGNIFICANT CHANGE UP (ref 0.2–1.2)
BUN SERPL-MCNC: 12 MG/DL — SIGNIFICANT CHANGE UP (ref 7–23)
CALCIUM SERPL-MCNC: 8.4 MG/DL — SIGNIFICANT CHANGE UP (ref 8.4–10.5)
CHLORIDE SERPL-SCNC: 88 MMOL/L — LOW (ref 96–108)
CO2 SERPL-SCNC: 23 MMOL/L — SIGNIFICANT CHANGE UP (ref 22–31)
CREAT SERPL-MCNC: 0.43 MG/DL — LOW (ref 0.5–1.3)
GLUCOSE BLDC GLUCOMTR-MCNC: 116 MG/DL — HIGH (ref 70–99)
GLUCOSE BLDC GLUCOMTR-MCNC: 117 MG/DL — HIGH (ref 70–99)
GLUCOSE BLDC GLUCOMTR-MCNC: 169 MG/DL — HIGH (ref 70–99)
GLUCOSE BLDC GLUCOMTR-MCNC: 220 MG/DL — HIGH (ref 70–99)
GLUCOSE SERPL-MCNC: 228 MG/DL — HIGH (ref 70–99)
HCT VFR BLD CALC: 32.7 % — LOW (ref 39–50)
HGB BLD-MCNC: 10.8 G/DL — LOW (ref 13–17)
MAGNESIUM SERPL-MCNC: 1.8 MG/DL — SIGNIFICANT CHANGE UP (ref 1.6–2.6)
MCHC RBC-ENTMCNC: 27.9 PG — SIGNIFICANT CHANGE UP (ref 27–34)
MCHC RBC-ENTMCNC: 33 GM/DL — SIGNIFICANT CHANGE UP (ref 32–36)
MCV RBC AUTO: 84.7 FL — SIGNIFICANT CHANGE UP (ref 80–100)
PHOSPHATE SERPL-MCNC: 2.6 MG/DL — SIGNIFICANT CHANGE UP (ref 2.5–4.5)
PLATELET # BLD AUTO: 259 K/UL — SIGNIFICANT CHANGE UP (ref 150–400)
POTASSIUM SERPL-MCNC: 4 MMOL/L — SIGNIFICANT CHANGE UP (ref 3.5–5.3)
POTASSIUM SERPL-SCNC: 4 MMOL/L — SIGNIFICANT CHANGE UP (ref 3.5–5.3)
PROT SERPL-MCNC: 7.3 G/DL — SIGNIFICANT CHANGE UP (ref 6–8.3)
RBC # BLD: 3.86 M/UL — LOW (ref 4.2–5.8)
RBC # FLD: 17.1 % — HIGH (ref 10.3–14.5)
SODIUM SERPL-SCNC: 129 MMOL/L — LOW (ref 135–145)
WBC # BLD: 15.5 K/UL — HIGH (ref 3.8–10.5)
WBC # FLD AUTO: 15.5 K/UL — HIGH (ref 3.8–10.5)

## 2018-06-17 PROCEDURE — 99231 SBSQ HOSP IP/OBS SF/LOW 25: CPT | Mod: GC,25

## 2018-06-17 PROCEDURE — 99233 SBSQ HOSP IP/OBS HIGH 50: CPT | Mod: GC

## 2018-06-17 PROCEDURE — 99254 IP/OBS CNSLTJ NEW/EST MOD 60: CPT

## 2018-06-17 RX ORDER — MORPHINE SULFATE 50 MG/1
4 CAPSULE, EXTENDED RELEASE ORAL EVERY 4 HOURS
Qty: 0 | Refills: 0 | Status: DISCONTINUED | OUTPATIENT
Start: 2018-06-17 | End: 2018-06-19

## 2018-06-17 RX ORDER — MORPHINE SULFATE 50 MG/1
4 CAPSULE, EXTENDED RELEASE ORAL EVERY 8 HOURS
Qty: 0 | Refills: 0 | Status: DISCONTINUED | OUTPATIENT
Start: 2018-06-17 | End: 2018-06-17

## 2018-06-17 RX ORDER — CEFEPIME 1 G/1
1000 INJECTION, POWDER, FOR SOLUTION INTRAMUSCULAR; INTRAVENOUS EVERY 8 HOURS
Qty: 0 | Refills: 0 | Status: DISCONTINUED | OUTPATIENT
Start: 2018-06-17 | End: 2018-06-20

## 2018-06-17 RX ORDER — HYDROMORPHONE HYDROCHLORIDE 2 MG/ML
0.2 INJECTION INTRAMUSCULAR; INTRAVENOUS; SUBCUTANEOUS
Qty: 0 | Refills: 0 | Status: DISCONTINUED | OUTPATIENT
Start: 2018-06-17 | End: 2018-06-19

## 2018-06-17 RX ADMIN — HYDROMORPHONE HYDROCHLORIDE 0.2 MILLIGRAM(S): 2 INJECTION INTRAMUSCULAR; INTRAVENOUS; SUBCUTANEOUS at 05:32

## 2018-06-17 RX ADMIN — Medication 100 MILLIGRAM(S): at 13:36

## 2018-06-17 RX ADMIN — POLYETHYLENE GLYCOL 3350 17 GRAM(S): 17 POWDER, FOR SOLUTION ORAL at 11:57

## 2018-06-17 RX ADMIN — MORPHINE SULFATE 4 MILLIGRAM(S): 50 CAPSULE, EXTENDED RELEASE ORAL at 17:25

## 2018-06-17 RX ADMIN — HYDROMORPHONE HYDROCHLORIDE 0.2 MILLIGRAM(S): 2 INJECTION INTRAMUSCULAR; INTRAVENOUS; SUBCUTANEOUS at 02:16

## 2018-06-17 RX ADMIN — Medication 250 MILLIGRAM(S): at 11:48

## 2018-06-17 RX ADMIN — ONDANSETRON 8 MILLIGRAM(S): 8 TABLET, FILM COATED ORAL at 05:32

## 2018-06-17 RX ADMIN — HYDROMORPHONE HYDROCHLORIDE 0.2 MILLIGRAM(S): 2 INJECTION INTRAMUSCULAR; INTRAVENOUS; SUBCUTANEOUS at 05:56

## 2018-06-17 RX ADMIN — MORPHINE SULFATE 4 MILLIGRAM(S): 50 CAPSULE, EXTENDED RELEASE ORAL at 22:33

## 2018-06-17 RX ADMIN — Medication 1 UNIT(S): at 18:20

## 2018-06-17 RX ADMIN — Medication 1 TABLET(S): at 11:56

## 2018-06-17 RX ADMIN — ONDANSETRON 8 MILLIGRAM(S): 8 TABLET, FILM COATED ORAL at 13:33

## 2018-06-17 RX ADMIN — CEFEPIME 100 MILLIGRAM(S): 1 INJECTION, POWDER, FOR SOLUTION INTRAMUSCULAR; INTRAVENOUS at 17:21

## 2018-06-17 RX ADMIN — SENNA PLUS 2 TABLET(S): 8.6 TABLET ORAL at 22:37

## 2018-06-17 RX ADMIN — CEFEPIME 100 MILLIGRAM(S): 1 INJECTION, POWDER, FOR SOLUTION INTRAMUSCULAR; INTRAVENOUS at 05:32

## 2018-06-17 RX ADMIN — MORPHINE SULFATE 4 MILLIGRAM(S): 50 CAPSULE, EXTENDED RELEASE ORAL at 13:07

## 2018-06-17 RX ADMIN — Medication 100 MILLIGRAM(S): at 22:37

## 2018-06-17 RX ADMIN — MORPHINE SULFATE 4 MILLIGRAM(S): 50 CAPSULE, EXTENDED RELEASE ORAL at 17:20

## 2018-06-17 RX ADMIN — Medication 2: at 13:32

## 2018-06-17 RX ADMIN — Medication 100 MILLIGRAM(S): at 05:33

## 2018-06-17 RX ADMIN — Medication 2 UNIT(S): at 13:32

## 2018-06-17 RX ADMIN — ENOXAPARIN SODIUM 30 MILLIGRAM(S): 100 INJECTION SUBCUTANEOUS at 11:55

## 2018-06-17 RX ADMIN — INSULIN GLARGINE 6 UNIT(S): 100 INJECTION, SOLUTION SUBCUTANEOUS at 09:05

## 2018-06-17 RX ADMIN — HYDROMORPHONE HYDROCHLORIDE 0.2 MILLIGRAM(S): 2 INJECTION INTRAMUSCULAR; INTRAVENOUS; SUBCUTANEOUS at 02:30

## 2018-06-17 RX ADMIN — HYDROMORPHONE HYDROCHLORIDE 0.2 MILLIGRAM(S): 2 INJECTION INTRAMUSCULAR; INTRAVENOUS; SUBCUTANEOUS at 20:54

## 2018-06-17 RX ADMIN — Medication 500 MILLIGRAM(S): at 11:56

## 2018-06-17 RX ADMIN — CEFEPIME 100 MILLIGRAM(S): 1 INJECTION, POWDER, FOR SOLUTION INTRAMUSCULAR; INTRAVENOUS at 22:33

## 2018-06-17 RX ADMIN — HYDROMORPHONE HYDROCHLORIDE 0.2 MILLIGRAM(S): 2 INJECTION INTRAMUSCULAR; INTRAVENOUS; SUBCUTANEOUS at 09:58

## 2018-06-17 RX ADMIN — HYDROMORPHONE HYDROCHLORIDE 0.2 MILLIGRAM(S): 2 INJECTION INTRAMUSCULAR; INTRAVENOUS; SUBCUTANEOUS at 09:56

## 2018-06-17 RX ADMIN — Medication 1: at 09:06

## 2018-06-17 RX ADMIN — MORPHINE SULFATE 4 MILLIGRAM(S): 50 CAPSULE, EXTENDED RELEASE ORAL at 13:30

## 2018-06-17 RX ADMIN — MORPHINE SULFATE 4 MILLIGRAM(S): 50 CAPSULE, EXTENDED RELEASE ORAL at 22:50

## 2018-06-17 RX ADMIN — HYDROMORPHONE HYDROCHLORIDE 0.2 MILLIGRAM(S): 2 INJECTION INTRAMUSCULAR; INTRAVENOUS; SUBCUTANEOUS at 21:15

## 2018-06-17 RX ADMIN — ONDANSETRON 8 MILLIGRAM(S): 8 TABLET, FILM COATED ORAL at 22:33

## 2018-06-17 RX ADMIN — Medication 3 UNIT(S): at 09:06

## 2018-06-17 NOTE — PROGRESS NOTE ADULT - PROBLEM SELECTOR PLAN 4
-Cr increased from 0.44 to 1.47  -Fena <1%, consistent with pre-renal etiology  -LR @75 cc/hr x 12 hours  -Renally dosing medications  -avoid nephrotoxins -Cr increased from 0.44 to 1.47, now resolved with IVF  -Fena <1%, consistent with pre-renal etiology  -Renally dosing medications  -avoid nephrotoxins -Cr increased from 0.44 to 1.47, now resolved with IVF  -Fena <1%, consistent with pre-renal etiology  -Renally dosing medications  -avoid nephrotoxins    #hyponatremia  Na>20, high urine osmolality consistent with SIADH. Na not improved s/p fluids. Will monitor off fluids, trend BMP

## 2018-06-17 NOTE — PHYSICAL THERAPY INITIAL EVALUATION ADULT - ADDITIONAL COMMENTS
Pt lives at home with family wife and daughter does not ambulate with any assisted device independent with ADL's and ambulation 4 steps to enter house with one hand rail

## 2018-06-17 NOTE — CHART NOTE - NSCHARTNOTEFT_GEN_A_CORE
Patient seen at bedside, discussed pain regimen with his wife and him.  Presently pain free, used 1 PRN overnight.  Will convert to PO today and team to follow-up in the AM.    MANAV

## 2018-06-17 NOTE — PROGRESS NOTE ADULT - PROBLEM SELECTOR PLAN 1
-Resolved   - patient met sepsis criteria on admission w/ fevers, leukocytosis, tachycardia, tachypnea   - Pt with cough on admission; he was treated for HAP in light of recent admission with Vanco and Cefepime; however, CT chest shows no evidence of PNA  -RVP negative  -Patient also with +u/a; possible UTI was covered with Cefepime -last febrile episode yesterday afternoon  - patient met sepsis criteria on admission w/ fevers, leukocytosis, tachycardia, tachypnea   - Pt with cough on admission; he was treated for HAP in light of recent admission with Vanco and Cefepime; however, CT chest shows no evidence of PNA  -RVP negative  -Patient also with +u/a; possible UTI was covered with Cefepime  -appreciate ID recs, continue with vanc and cefepime. Follow up repeat blood cultures. Consider CT A/P if fevers persists -last febrile episode yesterday afternoon  - patient met sepsis criteria on admission w/ fevers, leukocytosis, tachycardia, tachypnea   - Pt with cough on admission; he was treated for HAP in light of recent admission with Vanco and Cefepime; however, CT chest shows no evidence of PNA  -RVP negative  -Patient also with +u/a; possible UTI was covered with Cefepime  -appreciate ID recs, continue cefepime. Follow up repeat blood cultures. Consider CT A/P if fevers persists -last febrile episode yesterday afternoon  - patient met sepsis criteria on admission w/ fevers, leukocytosis, tachycardia, tachypnea   - Pt with cough on admission; he was treated for HAP in light of recent admission with Vanco and Cefepime; however, CT chest shows no evidence of PNA  -RVP negative  -Patient also with +u/a; possible UTI was covered with Cefepime/ with no growth from Urine CX   -appreciate ID recs, continue cefepime. Follow up repeat blood cultures. Consider CT A/P if fevers persists

## 2018-06-17 NOTE — CONSULT NOTE ADULT - ASSESSMENT
68M PMH metastatic neuroendocrine carcinoma (unknown primary, diagnosed via iliac bone biopsy, w/ pulmonary mets, mediastinal/hilar lymphadenopathy, hepatic mets, diffuse bone mets, s/p chemo/RT w/ progression of disease), DM2 (recent admission for DKA), HTN presents from [facility] with generalized weakness and abdominal pain. Found to have sepeis( fever with leukocytosis and tachycardia) in the ED. Source likely urine vs intra abdominal vs occult source vs malignancy. Patient with metastatic neuroendocrine tumor with mets to bone/liver hence with increased AST/ALK phos. Ct chest reveals small bilateral effusions with R atelectesis likely represent malignant effusions. Receiving Iv vanco/cefepime  for sepsis like presentation. Urine culture now with candida.     Recommend  --Fu final blood cultures  --Would continue empiric vanco/cefepime for now  --Monitor Vanco trough before 4th dose  -- 68M PMH metastatic neuroendocrine carcinoma (unknown primary, diagnosed via iliac bone biopsy, w/ pulmonary mets, mediastinal/hilar lymphadenopathy, hepatic mets, diffuse bone mets, s/p chemo/RT w/ progression of disease), DM2 (recent admission for DKA), HTN presents from [facility] with generalized weakness and abdominal pain. Found to have sepeis( fever with leukocytosis and tachycardia) in the ED. Source likely urine vs intra abdominal vs occult source vs malignancy. Patient with metastatic neuroendocrine tumor with mets to bone/liver hence with increased AST/ALK phos. Ct chest reveals small bilateral effusions with R atelectesis likely represent malignant effusions. Receiving Iv vanco/cefepime  for sepsis like presentation. Urine culture now with candida.     Recommend  --Fu final blood cultures  --Would continue empiric vanco/cefepime for now  --Monitor Vanco trough before 4th dose  --Abdominal pain/discomfort likely 2/2 metastatic disease  --Consider Ct abdomen if fever persists 68M PMH metastatic neuroendocrine carcinoma (unknown primary, diagnosed via iliac bone biopsy, w/ pulmonary mets, mediastinal/hilar lymphadenopathy, hepatic mets, diffuse bone mets, s/p chemo/RT w/ progression of disease), DM2 (recent admission for DKA), HTN presents from [facility] with generalized weakness and abdominal pain. Found to have sepeis( fever with leukocytosis and tachycardia) in the ED. Source likely urine vs intra abdominal vs occult source vs malignancy. Patient with metastatic neuroendocrine tumor with mets to bone/liver hence with increased AST/ALK phos. Ct chest reveals small bilateral effusions with R atelectesis likely represent malignant effusions. Receiving IV vanco/cefepime  for sepsis like presentation. Urine culture now with candida.     Recommend  --Fu final blood cultures  --Can Dc Vancomycin  --Continue with Cefepime, can increase  dose to 1g q8  --Abdominal pain/discomfort likely 2/2 metastatic disease vs cholangitis  --Consider Ct abdomen for the evaluation of pain/fever 68M PMH metastatic neuroendocrine carcinoma (unknown primary, diagnosed via iliac bone biopsy, w/ pulmonary mets, mediastinal/hilar lymphadenopathy, hepatic mets, diffuse bone mets, s/p chemo/RT w/ progression of disease), DM2 (recent admission for DKA), HTN presents from [facility] with generalized weakness and abdominal pain. Found to have sepeis( fever with leukocytosis and tachycardia) in the ED. Source likely urine( less likely) vs intra abdominal vs occult source vs malignancy. Patient with metastatic neuroendocrine tumor with mets to bone/liver hence with increased AST/ALK phos. Ct chest reveals small bilateral effusions with R atelectesis likely represent malignant effusions. Receiving IV vanco/cefepime  for sepsis like presentation. Urine culture now with candida.     Recommend  --Fu final blood cultures  --Can Dc Vancomycin  --Continue with Cefepime, can increase  dose to 1g q8  --Abdominal pain/discomfort likely 2/2 metastatic disease vs cholangitis  --Consider Ct abdomen for the evaluation of pain/fever 68M PMH metastatic neuroendocrine carcinoma (unknown primary, diagnosed via iliac bone biopsy, w/ pulmonary mets, mediastinal/hilar lymphadenopathy, hepatic mets, diffuse bone mets, s/p chemo/RT w/ progression of disease), DM2 (recent admission for DKA), HTN presents from [facility] with generalized weakness and abdominal pain. Found to have sepeis( fever with leukocytosis and tachycardia) in the ED. Source likely urine( less likely) vs intra abdominal vs occult source vs malignancy. Patient with metastatic neuroendocrine tumor with mets to bone/liver hence with increased AST/ALK phos. Ct chest reveals small bilateral effusions with R atelectesis likely represent malignant effusions. Receiving IV vanco/cefepime  for sepsis like presentation. Urine culture now with candida.   Overall sepsis ( fever, tachycardia, leucocytosis ) ? cholestasis given worsening hepatic mets vs tumor fever.       Recommend  --Fu final blood cultures  --Can Dc Vancomycin  --Continue with Cefepime, can increase  dose to 1g q8  --Abdominal pain/discomfort likely 2/2 metastatic disease vs cholangitis  --Consider Ct abdomen for the evaluation of pain/fever

## 2018-06-17 NOTE — PHYSICAL THERAPY INITIAL EVALUATION ADULT - PERTINENT HX OF CURRENT PROBLEM, REHAB EVAL
Pt is a 68 year old male admitted to [facility] on 6/14/18 for generalized weakness pt with history of metastatic neuroendocrine carcinoma (unknown primary, diagnosed via iliac bone biopsy, w/ pulmonary mets, mediastinal/hilar lymphadenopathy, hepatic mets, diffuse bone mets, s/p chemo/RT w/ progression of disease), DM2 (recent admission for DKA), HTN presents from [facility] with generalized weakness.

## 2018-06-17 NOTE — PROGRESS NOTE ADULT - PROBLEM SELECTOR PLAN 6
- stage IV neuroendocrine cancer w/ pulmonary mets, mediastinal / hilar lymphadenopathy, hepatic mets, bone mets  - recent CT chest, A/P demonstrated progression of disease  -Onc following  - Per onc, patient may not be a candidate for future disease modifying therapy given POD and failure to thrive  -Palliative consult; f/u recs - stage IV neuroendocrine cancer w/ pulmonary mets, mediastinal / hilar lymphadenopathy, hepatic mets, bone mets  - recent CT chest, A/P demonstrated progression of disease  -Onc following  - Per onc, patient may not be a candidate for future disease modifying therapy given POD and failure to thrive  -Palliative consult appreciated for symptom management. GOC discussion ongoing

## 2018-06-17 NOTE — PROGRESS NOTE ADULT - SUBJECTIVE AND OBJECTIVE BOX
Patient is a 68y old  Male who presents with a chief complaint of generalized weakness (14 Jun 2018 19:35)      SUBJECTIVE / OVERNIGHT EVENTS:    No acute overnight events    ROS: (  ) Fever, (  )Chills,  (  )Nausea/Vomiting, (  ) Cough, (  )Shortness of breath, (  )Chest Pain    MEDICATIONS  (STANDING):  ascorbic acid 500 milliGRAM(s) Oral daily  cefepime   IVPB 1000 milliGRAM(s) IV Intermittent every 12 hours  dextrose 5%. 1000 milliLiter(s) (50 mL/Hr) IV Continuous <Continuous>  dextrose 50% Injectable 12.5 Gram(s) IV Push once  dextrose 50% Injectable 25 Gram(s) IV Push once  dextrose 50% Injectable 25 Gram(s) IV Push once  docusate sodium 100 milliGRAM(s) Oral three times a day  enoxaparin Injectable 30 milliGRAM(s) SubCutaneous daily  HYDROmorphone  Injectable 0.2 milliGRAM(s) IV Push every 4 hours  insulin glargine Injectable (LANTUS) 6 Unit(s) SubCutaneous every morning  insulin lispro (HumaLOG) corrective regimen sliding scale   SubCutaneous three times a day before meals  insulin lispro (HumaLOG) corrective regimen sliding scale   SubCutaneous at bedtime  insulin lispro Injectable (HumaLOG) 2 Unit(s) SubCutaneous before lunch  insulin lispro Injectable (HumaLOG) 3 Unit(s) SubCutaneous before breakfast  insulin lispro Injectable (HumaLOG) 1 Unit(s) SubCutaneous before dinner  multivitamin 1 Tablet(s) Oral daily  ondansetron Injectable 8 milliGRAM(s) IV Push every 8 hours  polyethylene glycol 3350 17 Gram(s) Oral daily  senna 2 Tablet(s) Oral at bedtime  sodium chloride 0.9%. 1000 milliLiter(s) (150 mL/Hr) IV Continuous <Continuous>  vancomycin  IVPB 750 milliGRAM(s) IV Intermittent every 12 hours    MEDICATIONS  (PRN):  acetaminophen   Tablet 650 milliGRAM(s) Oral every 6 hours PRN For Temp greater than 38 C (100.4 F)  ALPRAZolam 0.5 milliGRAM(s) Oral every 12 hours PRN anxiety  dextrose 40% Gel 15 Gram(s) Oral once PRN Blood Glucose LESS THAN 70 milliGRAM(s)/deciliter  glucagon  Injectable 1 milliGRAM(s) IntraMuscular once PRN Glucose LESS THAN 70 milligrams/deciliter  HYDROmorphone  Injectable 0.2 milliGRAM(s) IV Push every 3 hours PRN Breakthrough pain  naloxone Injectable 0.1 milliGRAM(s) IV Push every 3 minutes PRN sedation or respiratory depression due to opioids      T(C): 37 (06-17 @ 04:42), Max: 38.1 (06-16 @ 13:14)   HR: 115   BP: 114/71   RR: 20   SpO2: 94%    PHYSICAL EXAM:   GENERAL: no acute distress  HEENT: NC/AT, EOMI, neck supple, MMM  RESPIRATORY: Diminished breath sounds L>R  CARDIOVASCULAR: RRR, no murmurs, gallops, rubs  ABDOMINAL: normal bowel sounds, firm, tender to palpation on R/LUQ  EXTREMITIES: no clubbing, cyanosis, or edema  NEUROLOGICAL: alert and oriented x 3, non-focal      LABS:                        10.0   12.0  )-----------( 222      ( 16 Jun 2018 13:17 )             30.0      06-16    131<L>  |  94<L>  |  13  ----------------------------<  208<H>  3.9   |  25  |  0.45<L>    Ca    8.2<L>      16 Jun 2018 13:17  Phos  2.8     06-16  Mg     2.0     06-16         CAPILLARY BLOOD GLUCOSE      POCT Blood Glucose.: 162 mg/dL (16 Jun 2018 21:45)  POCT Blood Glucose.: 192 mg/dL (16 Jun 2018 17:35)  POCT Blood Glucose.: 200 mg/dL (16 Jun 2018 13:08)  POCT Blood Glucose.: 175 mg/dL (16 Jun 2018 08:53)      RADIOLOGY & ADDITIONAL TESTS:    Imaging Personally Reviewed:  Consultant(s) Notes Reviewed:    Care Discussed with Consultants/Other Providers: Patient is a 68y old  Male who presents with a chief complaint of generalized weakness (14 Jun 2018 19:35)      SUBJECTIVE / OVERNIGHT EVENTS:    No acute overnight events. Patient reports feeling anxious this morning. Reports pain is well controlled, has mild pain on his left side on palpation. Denies fevers, chest pain, shortness of breath, n/v.     ROS: (  ) Fever, (  )Chills,  (  )Nausea/Vomiting, (X  ) Cough, (  )Shortness of breath, (  )Chest Pain    MEDICATIONS  (STANDING):  ascorbic acid 500 milliGRAM(s) Oral daily  cefepime   IVPB 1000 milliGRAM(s) IV Intermittent every 12 hours  dextrose 5%. 1000 milliLiter(s) (50 mL/Hr) IV Continuous <Continuous>  dextrose 50% Injectable 12.5 Gram(s) IV Push once  dextrose 50% Injectable 25 Gram(s) IV Push once  dextrose 50% Injectable 25 Gram(s) IV Push once  docusate sodium 100 milliGRAM(s) Oral three times a day  enoxaparin Injectable 30 milliGRAM(s) SubCutaneous daily  HYDROmorphone  Injectable 0.2 milliGRAM(s) IV Push every 4 hours  insulin glargine Injectable (LANTUS) 6 Unit(s) SubCutaneous every morning  insulin lispro (HumaLOG) corrective regimen sliding scale   SubCutaneous three times a day before meals  insulin lispro (HumaLOG) corrective regimen sliding scale   SubCutaneous at bedtime  insulin lispro Injectable (HumaLOG) 2 Unit(s) SubCutaneous before lunch  insulin lispro Injectable (HumaLOG) 3 Unit(s) SubCutaneous before breakfast  insulin lispro Injectable (HumaLOG) 1 Unit(s) SubCutaneous before dinner  multivitamin 1 Tablet(s) Oral daily  ondansetron Injectable 8 milliGRAM(s) IV Push every 8 hours  polyethylene glycol 3350 17 Gram(s) Oral daily  senna 2 Tablet(s) Oral at bedtime  sodium chloride 0.9%. 1000 milliLiter(s) (150 mL/Hr) IV Continuous <Continuous>  vancomycin  IVPB 750 milliGRAM(s) IV Intermittent every 12 hours    MEDICATIONS  (PRN):  acetaminophen   Tablet 650 milliGRAM(s) Oral every 6 hours PRN For Temp greater than 38 C (100.4 F)  ALPRAZolam 0.5 milliGRAM(s) Oral every 12 hours PRN anxiety  dextrose 40% Gel 15 Gram(s) Oral once PRN Blood Glucose LESS THAN 70 milliGRAM(s)/deciliter  glucagon  Injectable 1 milliGRAM(s) IntraMuscular once PRN Glucose LESS THAN 70 milligrams/deciliter  HYDROmorphone  Injectable 0.2 milliGRAM(s) IV Push every 3 hours PRN Breakthrough pain  naloxone Injectable 0.1 milliGRAM(s) IV Push every 3 minutes PRN sedation or respiratory depression due to opioids      T(C): 37 (06-17 @ 04:42), Max: 38.1 (06-16 @ 13:14)   HR: 115   BP: 114/71   RR: 20   SpO2: 94%    PHYSICAL EXAM:   GENERAL: no acute distress  HEENT: NC/AT, EOMI, neck supple, MMM  RESPIRATORY: Crackles, R  CARDIOVASCULAR: RRR, no murmurs, gallops, rubs  ABDOMINAL: normal bowel sounds, firm, tender to palpation on R/LUQ  EXTREMITIES: no clubbing, cyanosis, or edema  NEUROLOGICAL: alert and oriented x 3, non-focal      LABS:                        10.0   12.0  )-----------( 222      ( 16 Jun 2018 13:17 )             30.0      06-16    131<L>  |  94<L>  |  13  ----------------------------<  208<H>  3.9   |  25  |  0.45<L>    Ca    8.2<L>      16 Jun 2018 13:17  Phos  2.8     06-16  Mg     2.0     06-16         CAPILLARY BLOOD GLUCOSE      POCT Blood Glucose.: 162 mg/dL (16 Jun 2018 21:45)  POCT Blood Glucose.: 192 mg/dL (16 Jun 2018 17:35)  POCT Blood Glucose.: 200 mg/dL (16 Jun 2018 13:08)  POCT Blood Glucose.: 175 mg/dL (16 Jun 2018 08:53)      RADIOLOGY & ADDITIONAL TESTS:    Imaging Personally Reviewed:  Consultant(s) Notes Reviewed:    Care Discussed with Consultants/Other Providers:

## 2018-06-17 NOTE — PHYSICAL THERAPY INITIAL EVALUATION ADULT - PRECAUTIONS/LIMITATIONS, REHAB EVAL
Patient had recent admission 6/6- 6/12 for DKA, was on Lantus and Invokana.  Invokana was d/c'd, and he was discharged on Lantus and Prandin per endocrine.  Patient was also treated for a UTI w/ ceftriaxone. In regards to his neuroendocrine tumor, he was diagnosed by bone biopsy November 2017, was treated with carboplatin/etoposide until March 2018 when he was discovered to have POD.  He is planning on starting immunotherapy.    Chest X ray 6/14/18 Hazy opacity in the left lower lung field, which may represent pneumoniain the right clinical setting. fall precautions/Patient had recent admission 6/6- 6/12 for DKA, was on Lantus and Invokana.  Invokana was d/c'd, and he was discharged on Lantus and Prandin per endocrine.  Patient was also treated for a UTI w/ ceftriaxone. In regards to his neuroendocrine tumor, he was diagnosed by bone biopsy November 2017, was treated with carboplatin/etoposide until March 2018 when he was discovered to have POD.  He is planning on starting immunotherapy.    Chest X ray 6/14/18 Hazy opacity in the left lower lung field, which may represent pneumoniain the right clinical setting.

## 2018-06-17 NOTE — CONSULT NOTE ADULT - SUBJECTIVE AND OBJECTIVE BOX
Patient is a 68y old  Male who presents with a chief complaint of generalized weakness (14 Jun 2018 19:35)    HPI:  68M PMH metastatic neuroendocrine carcinoma (unknown primary, diagnosed via iliac bone biopsy, w/ pulmonary mets, mediastinal/hilar lymphadenopathy, hepatic mets, diffuse bone mets, s/p chemo/RT w/ progression of disease), DM2 (recent admission for DKA), HTN presents from [facility] with generalized weakness.  Patient had recent admission 6/6- 6/12 for DKA, was on Lantus and Invokana.  Invokana was d/c'd, and he was discharged on Lantus and Prandin per endocrine.  Patient was also treated for a UTI w/ ceftriaxone.  He was at [facility] today and daughter c/o decreased appetite, abdominal pain.  Daughter reports he has been intermittently confused but that today he is less confused.  She reports dry cough for the past 2 days since leaving the hospital.  Denies wheezing, fevers, chills, shortness of breath, diarrhea, nausea, vomiting.  He did not take his lantus this am because he was going to the doctor, usually takes lantus 8 units, prandin 0.5 mg TID.  She denies hypoglycemia, has been checking his blood sugars and they have been 90s-100s, had one reading of 600 yesterday after eating a sandwitch so gave extra lantus.      In regards to his neuroendocrine tumor, he was diagnosed by bone biopsy November 2017, was treated with carboplatin/etoposide until March 2018 when he was discovered to have POD.  He is planning on starting immunotherapy.       In the ED .8 122 130/82 20 98% RA  Labs w/ 12.9 w/ L shift, hgb 10.9.  Na 132 (corrected for glucose), Cr 0.55.  VBG w/ ph 7.4, lactate 3.9, alk phos 1107, , ALT 38.  CXR w/ new L pleural effusion.  Blood and urine cultures pending.    UA grossly positive w/ + LE, 26-50 WBCs, moderate ketones. +yeast   In the ED given cefepime 2L IVF, morphine 2 mg (14 Jun 2018 19:35)      PAST MEDICAL & SURGICAL HISTORY:  Neuroendocrine cancer  Hyperlipidemia  Diabetes  HTN (hypertension)  No significant past surgical history      Social history:    FAMILY HISTORY:  No pertinent family history in first degree relatives    Allergies    No Known Allergies    Intolerances        Antimicrobials:    cefepime   IVPB 1000 milliGRAM(s) IV Intermittent every 12 hours  vancomycin  IVPB 750 milliGRAM(s) IV Intermittent every 12 hours        Vital Signs Last 24 Hrs  T(C): 37 (17 Jun 2018 04:42), Max: 38.1 (16 Jun 2018 13:14)  T(F): 98.6 (17 Jun 2018 04:42), Max: 100.5 (16 Jun 2018 13:14)  HR: 115 (17 Jun 2018 04:42) (112 - 120)  BP: 114/71 (17 Jun 2018 04:42) (108/70 - 114/74)  BP(mean): --  RR: 20 (17 Jun 2018 04:42) (20 - 20)  SpO2: 94% (17 Jun 2018 04:42) (94% - 96%)                              10.8   15.5  )-----------( 259      ( 17 Jun 2018 10:53 )             32.7         06-17    129<L>  |  88<L>  |  12  ----------------------------<  228<H>  4.0   |  23  |  0.43<L>    Ca    8.4      17 Jun 2018 10:53  Phos  2.6     06-17  Mg     1.8     06-17    TPro  7.3  /  Alb  2.3<L>  /  TBili  1.2  /  DBili  x   /  AST  108<H>  /  ALT  27  /  AlkPhos  944<H>  06-17      Urinalysis + Microscopic Examination (06.14.18 @ 16:01)    pH Urine: 6.0    Blood, Urine: Trace    Urine Appearance: SL Turbid    Urobilinogen: 2 mg/dL    Specific Gravity: 1.024    Protein, Urine: 100 mg/dL    Bilirubin: Small    Ketone - Urine: Moderate    Color: Yellow    Glucose Qualitative, Urine: >1000 mg/dL    Leukocyte Esterase Concentration: Large    Nitrite: Negative    Red Blood Cell - Urine: 5-10 /HPF    White Blood Cell - Urine: 26-50 /HPF    Epithelial Cells: OCC /HPF    Hyaline Casts: 2-5    Bacteria: Few /HPF    Comment - Urine: FEW YEAST CELLS PRESENT      RECENT CULTURES:  06-14 @ 18:16  .Urine Catheterized  10,000 - 49,000 CFU/mL Presumptive Candida albicans    06-14 @ 16:41  .Blood Blood  No growth to date.      RADIOLOGY  Xray Chest 1 View- PORTABLE-Urgent (06.14.18 @ 17:38)  IMPRESSION:   Hazy opacity in the left lower lung field, which may represent pneumonia   in the right clinical setting.      CT Chest No Cont (06.16.18 @ 10:10)  IMPRESSION:  No pneumonia.  New small right pleural effusion with associated right lower lobe passive   atelectasis. New trace left pleural effusion with underlying soft tissue,   likely representing a malignant effusion.  Hepatic, pulmonary, and osseous metastases are unchanged. Patient is a 68y old  Male who presents with a chief complaint of generalized weakness (14 Jun 2018 19:35)    HPI:  68M PMH metastatic neuroendocrine carcinoma (unknown primary, diagnosed via iliac bone biopsy, w/ pulmonary mets, mediastinal/hilar lymphadenopathy, hepatic mets, diffuse bone mets, s/p chemo/RT w/ progression of disease), DM2 (recent admission for DKA), HTN presents from [facility] with generalized weakness.  Patient had recent admission 6/6- 6/12 for DKA, was on Lantus and Invokana.  Invokana was d/c'd, and he was discharged on Lantus and Prandin per endocrine.  Patient was also treated for a UTI w/ ceftriaxone.  He was at [facility] today and daughter c/o decreased appetite, abdominal pain.  Daughter reports he has been intermittently confused but that today he is less confused.  She reports dry cough for the past 2 days since leaving the hospital.  Denies wheezing, fevers, chills, shortness of breath, diarrhea, nausea, vomiting.  He did not take his lantus this am because he was going to the doctor, usually takes lantus 8 units, prandin 0.5 mg TID.  She denies hypoglycemia, has been checking his blood sugars and they have been 90s-100s, had one reading of 600 yesterday after eating a sandwitch so gave extra lantus.      In regards to his neuroendocrine tumor, he was diagnosed by bone biopsy November 2017, was treated with carboplatin/etoposide until March 2018 when he was discovered to have POD.  He is planning on starting immunotherapy.       In the ED .8 122 130/82 20 98% RA  Labs w/ 12.9 w/ L shift, hgb 10.9.  Na 132 (corrected for glucose), Cr 0.55.  VBG w/ ph 7.4, lactate 3.9, alk phos 1107, , ALT 38.  CXR w/ new L pleural effusion.  Blood and urine cultures pending.    UA grossly positive w/ + LE, 26-50 WBCs, moderate ketones. +yeast   In the ED given cefepime 2L IVF, morphine 2 mg (14 Jun 2018 19:35)      PAST MEDICAL & SURGICAL HISTORY:  Neuroendocrine cancer  Hyperlipidemia  Diabetes  HTN (hypertension)  No significant past surgical history      Social history:  No smoking or alcohol abuse  Originally from [place]    FAMILY HISTORY:  No pertinent family history in first degree relatives    Allergies    No Known Allergies    Intolerances        Antimicrobials:    cefepime   IVPB 1000 milliGRAM(s) IV Intermittent every 12 hours  vancomycin  IVPB 750 milliGRAM(s) IV Intermittent every 12 hours        Vital Signs Last 24 Hrs  T(C): 37 (17 Jun 2018 04:42), Max: 38.1 (16 Jun 2018 13:14)  T(F): 98.6 (17 Jun 2018 04:42), Max: 100.5 (16 Jun 2018 13:14)  HR: 115 (17 Jun 2018 04:42) (112 - 120)  BP: 114/71 (17 Jun 2018 04:42) (108/70 - 114/74)  BP(mean): --  RR: 20 (17 Jun 2018 04:42) (20 - 20)  SpO2: 94% (17 Jun 2018 04:42) (94% - 96%)                              10.8   15.5  )-----------( 259      ( 17 Jun 2018 10:53 )             32.7         06-17    129<L>  |  88<L>  |  12  ----------------------------<  228<H>  4.0   |  23  |  0.43<L>    Ca    8.4      17 Jun 2018 10:53  Phos  2.6     06-17  Mg     1.8     06-17    TPro  7.3  /  Alb  2.3<L>  /  TBili  1.2  /  DBili  x   /  AST  108<H>  /  ALT  27  /  AlkPhos  944<H>  06-17      Urinalysis + Microscopic Examination (06.14.18 @ 16:01)    pH Urine: 6.0    Blood, Urine: Trace    Urine Appearance: SL Turbid    Urobilinogen: 2 mg/dL    Specific Gravity: 1.024    Protein, Urine: 100 mg/dL    Bilirubin: Small    Ketone - Urine: Moderate    Color: Yellow    Glucose Qualitative, Urine: >1000 mg/dL    Leukocyte Esterase Concentration: Large    Nitrite: Negative    Red Blood Cell - Urine: 5-10 /HPF    White Blood Cell - Urine: 26-50 /HPF    Epithelial Cells: OCC /HPF    Hyaline Casts: 2-5    Bacteria: Few /HPF    Comment - Urine: FEW YEAST CELLS PRESENT      RECENT CULTURES:  06-14 @ 18:16  .Urine Catheterized  10,000 - 49,000 CFU/mL Presumptive Candida albicans    06-14 @ 16:41  .Blood Blood  No growth to date.      RADIOLOGY  Xray Chest 1 View- PORTABLE-Urgent (06.14.18 @ 17:38)  IMPRESSION:   Hazy opacity in the left lower lung field, which may represent pneumonia   in the right clinical setting.      CT Chest No Cont (06.16.18 @ 10:10)  IMPRESSION:  No pneumonia.  New small right pleural effusion with associated right lower lobe passive   atelectasis. New trace left pleural effusion with underlying soft tissue,   likely representing a malignant effusion.  Hepatic, pulmonary, and osseous metastases are unchanged. Patient is a 68y old  Male who presents with a chief complaint of generalized weakness (14 Jun 2018 19:35)    HPI:  68M PMH metastatic neuroendocrine carcinoma (unknown primary, diagnosed via iliac bone biopsy, w/ pulmonary mets, mediastinal/hilar lymphadenopathy, hepatic mets, diffuse bone mets, s/p chemo/RT w/ progression of disease), DM2 (recent admission for DKA), HTN presents from [facility] with generalized weakness.  Patient had recent admission 6/6- 6/12 for DKA, was on Lantus and Invokana.  Invokana was d/c'd, and he was discharged on Lantus and Prandin per endocrine.  Patient was also treated for a UTI w/ ceftriaxone.  He was at [facility] today and daughter c/o decreased appetite, abdominal pain.  Daughter reports he has been intermittently confused but that today he is less confused.  She reports dry cough for the past 2 days since leaving the hospital.  Denies wheezing, fevers, chills, shortness of breath, diarrhea, nausea, vomiting.  He did not take his lantus this am because he was going to the doctor, usually takes lantus 8 units, prandin 0.5 mg TID.  She denies hypoglycemia, has been checking his blood sugars and they have been 90s-100s, had one reading of 600 yesterday after eating a sandwitch so gave extra lantus.      In regards to his neuroendocrine tumor, he was diagnosed by bone biopsy November 2017, was treated with carboplatin/etoposide until March 2018 when he was discovered to have POD.  He is planning on starting immunotherapy.       In the ED .8 122 130/82 20 98% RA  Labs w/ 12.9 w/ L shift, hgb 10.9.  Na 132 (corrected for glucose), Cr 0.55.  VBG w/ ph 7.4, lactate 3.9, alk phos 1107, , ALT 38.  CXR w/ new L pleural effusion.  Blood and urine cultures pending.    UA grossly positive w/ + LE, 26-50 WBCs, moderate ketones. +yeast   In the ED given cefepime 2L IVF, morphine 2 mg (14 Jun 2018 19:35)  Above reviewed:  Pt says abdominal pain unchanged, poor appetite and lethargy per daughter for last few weeks. Fevers are new but they did not measure temps at home.     PAST MEDICAL & SURGICAL HISTORY:  Neuroendocrine cancer  Hyperlipidemia  Diabetes  HTN (hypertension)  No significant past surgical history      Social history:  No smoking or alcohol abuse  Originally from [place]    FAMILY HISTORY:  No pertinent family history in first degree relatives    Allergies  No Known Allergies          Antimicrobials:  cefepime   IVPB 1000 milliGRAM(s) IV Intermittent every 12 hours  vancomycin  IVPB 750 milliGRAM(s) IV Intermittent every 12 hours        Vital Signs Last 24 Hrs  T(C): 37 (17 Jun 2018 04:42), Max: 38.1 (16 Jun 2018 13:14)  T(F): 98.6 (17 Jun 2018 04:42), Max: 100.5 (16 Jun 2018 13:14)  HR: 115 (17 Jun 2018 04:42) (112 - 120)  BP: 114/71 (17 Jun 2018 04:42) (108/70 - 114/74)  RR: 20 (17 Jun 2018 04:42) (20 - 20)  SpO2: 94% (17 Jun 2018 04:42) (94% - 96%)                              10.8   15.5  )-----------( 259      ( 17 Jun 2018 10:53 )             32.7         06-17    129<L>  |  88<L>  |  12  ----------------------------<  228<H>  4.0   |  23  |  0.43<L>    Ca    8.4      17 Jun 2018 10:53  Phos  2.6     06-17  Mg     1.8     06-17    TPro  7.3  /  Alb  2.3<L>  /  TBili  1.2  /  DBili  x   /  AST  108<H>  /  ALT  27  /  AlkPhos  944<H>  06-17      Urinalysis + Microscopic Examination (06.14.18 @ 16:01)    pH Urine: 6.0    Blood, Urine: Trace    Urine Appearance: SL Turbid    Urobilinogen: 2 mg/dL    Specific Gravity: 1.024    Protein, Urine: 100 mg/dL    Bilirubin: Small    Ketone - Urine: Moderate    Color: Yellow    Glucose Qualitative, Urine: >1000 mg/dL    Leukocyte Esterase Concentration: Large    Nitrite: Negative    Red Blood Cell - Urine: 5-10 /HPF    White Blood Cell - Urine: 26-50 /HPF    Epithelial Cells: OCC /HPF    Hyaline Casts: 2-5    Bacteria: Few /HPF    Comment - Urine: FEW YEAST CELLS PRESENT      RECENT CULTURES:  06-14 @ 18:16  .Urine Catheterized  10,000 - 49,000 CFU/mL Presumptive Candida albicans    06-14 @ 16:41  .Blood Blood  No growth to date.      RADIOLOGY: Imaging reviewed personally.   Xray Chest 1 View- PORTABLE-Urgent (06.14.18 @ 17:38)  IMPRESSION:   Hazy opacity in the left lower lung field, which may represent pneumonia   in the right clinical setting.      CT Chest No Cont (06.16.18 @ 10:10)  IMPRESSION:  No pneumonia.  New small right pleural effusion with associated right lower lobe passive   atelectasis. New trace left pleural effusion with underlying soft tissue,   likely representing a malignant effusion.  Hepatic, pulmonary, and osseous metastases are unchanged.

## 2018-06-17 NOTE — PROGRESS NOTE ADULT - ASSESSMENT
68M PMH metastatic neuroendocrine carcinoma (unknown primary, diagnosed via iliac bone biopsy, w/ pulmonary mets, mediastinal/hilar lymphadenopathy, hepatic mets, diffuse bone mets, s/p chemo/RT w/ progression of disease), DM2 (recent admission for DKA), HTN presents from [facility] with generalized weakness found to have sepsis in likely setting of UTI; CT negative for PNA.

## 2018-06-17 NOTE — CONSULT NOTE ADULT - CONSULT REQUESTED DATE/TIME
15-Pierre-2018 12:03
15-Pierre-2018 14:38
15-Pierre-2018 17:48
17-Jun-2018 12:41
15-Pierre-2018 12:58

## 2018-06-17 NOTE — PROGRESS NOTE ADULT - PROBLEM SELECTOR PLAN 10
-Palliative consult as above  -Pt still remains full code; GOC discussions with wife tomorrow -Palliative consult as above  -Pt still remains full code; GOC discussions with wife

## 2018-06-18 LAB
ALBUMIN SERPL ELPH-MCNC: 2 G/DL — LOW (ref 3.3–5)
ALP SERPL-CCNC: 846 U/L — HIGH (ref 40–120)
ALT FLD-CCNC: 22 U/L — SIGNIFICANT CHANGE UP (ref 10–45)
ANION GAP SERPL CALC-SCNC: 13 MMOL/L — SIGNIFICANT CHANGE UP (ref 5–17)
AST SERPL-CCNC: 104 U/L — HIGH (ref 10–40)
BILIRUB SERPL-MCNC: 1.2 MG/DL — SIGNIFICANT CHANGE UP (ref 0.2–1.2)
BUN SERPL-MCNC: 10 MG/DL — SIGNIFICANT CHANGE UP (ref 7–23)
CALCIUM SERPL-MCNC: 8 MG/DL — LOW (ref 8.4–10.5)
CHLORIDE SERPL-SCNC: 90 MMOL/L — LOW (ref 96–108)
CO2 SERPL-SCNC: 26 MMOL/L — SIGNIFICANT CHANGE UP (ref 22–31)
CREAT ?TM UR-MCNC: 38 MG/DL — SIGNIFICANT CHANGE UP
CREAT SERPL-MCNC: 0.4 MG/DL — LOW (ref 0.5–1.3)
GLUCOSE BLDC GLUCOMTR-MCNC: 132 MG/DL — HIGH (ref 70–99)
GLUCOSE BLDC GLUCOMTR-MCNC: 133 MG/DL — HIGH (ref 70–99)
GLUCOSE BLDC GLUCOMTR-MCNC: 281 MG/DL — HIGH (ref 70–99)
GLUCOSE BLDC GLUCOMTR-MCNC: 90 MG/DL — SIGNIFICANT CHANGE UP (ref 70–99)
GLUCOSE SERPL-MCNC: 127 MG/DL — HIGH (ref 70–99)
HCT VFR BLD CALC: 27.1 % — LOW (ref 39–50)
HGB BLD-MCNC: 8.9 G/DL — LOW (ref 13–17)
MAGNESIUM SERPL-MCNC: 1.8 MG/DL — SIGNIFICANT CHANGE UP (ref 1.6–2.6)
MCHC RBC-ENTMCNC: 27.3 PG — SIGNIFICANT CHANGE UP (ref 27–34)
MCHC RBC-ENTMCNC: 32.8 GM/DL — SIGNIFICANT CHANGE UP (ref 32–36)
MCV RBC AUTO: 83.3 FL — SIGNIFICANT CHANGE UP (ref 80–100)
OSMOLALITY UR: 300 MOS/KG — SIGNIFICANT CHANGE UP (ref 300–900)
PHOSPHATE SERPL-MCNC: 3.2 MG/DL — SIGNIFICANT CHANGE UP (ref 2.5–4.5)
PLATELET # BLD AUTO: 188 K/UL — SIGNIFICANT CHANGE UP (ref 150–400)
POTASSIUM SERPL-MCNC: 3.4 MMOL/L — LOW (ref 3.5–5.3)
POTASSIUM SERPL-SCNC: 3.4 MMOL/L — LOW (ref 3.5–5.3)
PROT SERPL-MCNC: 6.5 G/DL — SIGNIFICANT CHANGE UP (ref 6–8.3)
RBC # BLD: 3.25 M/UL — LOW (ref 4.2–5.8)
RBC # FLD: 17.4 % — HIGH (ref 10.3–14.5)
SODIUM SERPL-SCNC: 129 MMOL/L — LOW (ref 135–145)
SODIUM UR-SCNC: <20 MMOL/L — SIGNIFICANT CHANGE UP
WBC # BLD: 9.7 K/UL — SIGNIFICANT CHANGE UP (ref 3.8–10.5)
WBC # FLD AUTO: 9.7 K/UL — SIGNIFICANT CHANGE UP (ref 3.8–10.5)

## 2018-06-18 PROCEDURE — 99232 SBSQ HOSP IP/OBS MODERATE 35: CPT | Mod: GC

## 2018-06-18 PROCEDURE — 99232 SBSQ HOSP IP/OBS MODERATE 35: CPT

## 2018-06-18 PROCEDURE — 99233 SBSQ HOSP IP/OBS HIGH 50: CPT | Mod: GC

## 2018-06-18 PROCEDURE — 74177 CT ABD & PELVIS W/CONTRAST: CPT | Mod: 26

## 2018-06-18 PROCEDURE — 70552 MRI BRAIN STEM W/DYE: CPT | Mod: 26

## 2018-06-18 RX ORDER — ENOXAPARIN SODIUM 100 MG/ML
40 INJECTION SUBCUTANEOUS DAILY
Qty: 0 | Refills: 0 | Status: DISCONTINUED | OUTPATIENT
Start: 2018-06-18 | End: 2018-06-22

## 2018-06-18 RX ORDER — INSULIN LISPRO 100/ML
4 VIAL (ML) SUBCUTANEOUS
Qty: 0 | Refills: 0 | Status: DISCONTINUED | OUTPATIENT
Start: 2018-06-18 | End: 2018-06-19

## 2018-06-18 RX ORDER — POTASSIUM CHLORIDE 20 MEQ
40 PACKET (EA) ORAL ONCE
Qty: 0 | Refills: 0 | Status: COMPLETED | OUTPATIENT
Start: 2018-06-18 | End: 2018-06-18

## 2018-06-18 RX ORDER — SODIUM CHLORIDE 9 MG/ML
1000 INJECTION, SOLUTION INTRAVENOUS
Qty: 0 | Refills: 0 | Status: DISCONTINUED | OUTPATIENT
Start: 2018-06-18 | End: 2018-06-19

## 2018-06-18 RX ORDER — INSULIN LISPRO 100/ML
3 VIAL (ML) SUBCUTANEOUS
Qty: 0 | Refills: 0 | Status: DISCONTINUED | OUTPATIENT
Start: 2018-06-18 | End: 2018-06-18

## 2018-06-18 RX ADMIN — CEFEPIME 100 MILLIGRAM(S): 1 INJECTION, POWDER, FOR SOLUTION INTRAMUSCULAR; INTRAVENOUS at 22:17

## 2018-06-18 RX ADMIN — SENNA PLUS 2 TABLET(S): 8.6 TABLET ORAL at 22:17

## 2018-06-18 RX ADMIN — ONDANSETRON 8 MILLIGRAM(S): 8 TABLET, FILM COATED ORAL at 06:10

## 2018-06-18 RX ADMIN — Medication 1 UNIT(S): at 17:49

## 2018-06-18 RX ADMIN — Medication 500 MILLIGRAM(S): at 12:10

## 2018-06-18 RX ADMIN — MORPHINE SULFATE 4 MILLIGRAM(S): 50 CAPSULE, EXTENDED RELEASE ORAL at 10:50

## 2018-06-18 RX ADMIN — Medication 100 MILLIGRAM(S): at 06:10

## 2018-06-18 RX ADMIN — Medication 100 MILLIGRAM(S): at 14:37

## 2018-06-18 RX ADMIN — ONDANSETRON 8 MILLIGRAM(S): 8 TABLET, FILM COATED ORAL at 22:17

## 2018-06-18 RX ADMIN — INSULIN GLARGINE 6 UNIT(S): 100 INJECTION, SOLUTION SUBCUTANEOUS at 08:56

## 2018-06-18 RX ADMIN — SODIUM CHLORIDE 100 MILLILITER(S): 9 INJECTION, SOLUTION INTRAVENOUS at 22:18

## 2018-06-18 RX ADMIN — MORPHINE SULFATE 4 MILLIGRAM(S): 50 CAPSULE, EXTENDED RELEASE ORAL at 11:50

## 2018-06-18 RX ADMIN — CEFEPIME 100 MILLIGRAM(S): 1 INJECTION, POWDER, FOR SOLUTION INTRAMUSCULAR; INTRAVENOUS at 14:36

## 2018-06-18 RX ADMIN — MORPHINE SULFATE 4 MILLIGRAM(S): 50 CAPSULE, EXTENDED RELEASE ORAL at 06:10

## 2018-06-18 RX ADMIN — Medication 0.5 MILLIGRAM(S): at 16:04

## 2018-06-18 RX ADMIN — MORPHINE SULFATE 4 MILLIGRAM(S): 50 CAPSULE, EXTENDED RELEASE ORAL at 02:50

## 2018-06-18 RX ADMIN — MORPHINE SULFATE 4 MILLIGRAM(S): 50 CAPSULE, EXTENDED RELEASE ORAL at 15:20

## 2018-06-18 RX ADMIN — POLYETHYLENE GLYCOL 3350 17 GRAM(S): 17 POWDER, FOR SOLUTION ORAL at 12:11

## 2018-06-18 RX ADMIN — SODIUM CHLORIDE 100 MILLILITER(S): 9 INJECTION, SOLUTION INTRAVENOUS at 14:45

## 2018-06-18 RX ADMIN — Medication 1 TABLET(S): at 12:10

## 2018-06-18 RX ADMIN — Medication 40 MILLIEQUIVALENT(S): at 09:02

## 2018-06-18 RX ADMIN — Medication 3: at 12:11

## 2018-06-18 RX ADMIN — CEFEPIME 100 MILLIGRAM(S): 1 INJECTION, POWDER, FOR SOLUTION INTRAMUSCULAR; INTRAVENOUS at 06:10

## 2018-06-18 RX ADMIN — MORPHINE SULFATE 4 MILLIGRAM(S): 50 CAPSULE, EXTENDED RELEASE ORAL at 06:40

## 2018-06-18 RX ADMIN — MORPHINE SULFATE 4 MILLIGRAM(S): 50 CAPSULE, EXTENDED RELEASE ORAL at 02:32

## 2018-06-18 RX ADMIN — Medication 3 UNIT(S): at 08:55

## 2018-06-18 RX ADMIN — Medication 100 MILLIGRAM(S): at 22:17

## 2018-06-18 RX ADMIN — MORPHINE SULFATE 4 MILLIGRAM(S): 50 CAPSULE, EXTENDED RELEASE ORAL at 22:17

## 2018-06-18 RX ADMIN — MORPHINE SULFATE 4 MILLIGRAM(S): 50 CAPSULE, EXTENDED RELEASE ORAL at 22:45

## 2018-06-18 RX ADMIN — Medication 2 UNIT(S): at 12:11

## 2018-06-18 RX ADMIN — ENOXAPARIN SODIUM 30 MILLIGRAM(S): 100 INJECTION SUBCUTANEOUS at 12:11

## 2018-06-18 RX ADMIN — MORPHINE SULFATE 4 MILLIGRAM(S): 50 CAPSULE, EXTENDED RELEASE ORAL at 14:34

## 2018-06-18 NOTE — PROGRESS NOTE ADULT - SUBJECTIVE AND OBJECTIVE BOX
Patient is a 68y old  Male who presents with a chief complaint of generalized weakness (14 Jun 2018 19:35)    Overnight Events:  No acute events ON.     REVIEW OF SYSTEMS:  CONSTITUTIONAL: No weakness, fevers or chills  EYES/ENT: No visual changes, no throat pain   RESPIRATORY: No cough, wheezing, hemoptysis; No shortness of breath  CARDIOVASCULAR: No chest pain or palpitations  GASTROINTESTINAL: No abdominal, nausea, vomiting, or hematemesis; No diarrhea or constipation. No melena or hematochezia.  GENITOURINARY: No dysuria, frequency or hematuria  NEUROLOGICAL: No dizziness, numbness, or weakness  SKIN: No itching, burning, rashes, or lesions   All other review of systems is negative unless indicated above.    VITAL SIGNS:  Vital Signs Last 24 Hrs  T(C): 36.8 (18 Jun 2018 14:24), Max: 37.3 (18 Jun 2018 04:18)  T(F): 98.2 (18 Jun 2018 14:24), Max: 99.1 (18 Jun 2018 04:18)  HR: 114 (18 Jun 2018 14:24) (109 - 116)  BP: 116/69 (18 Jun 2018 14:24) (104/67 - 116/69)  BP(mean): --  RR: 18 (18 Jun 2018 14:24) (17 - 18)  SpO2: 95% (18 Jun 2018 14:24) (94% - 95%)    PHYSICAL EXAM:   GENERAL: no acute distress  HEENT: NC/AT, EOMI, neck supple, MMM  RESPIRATORY: LCTAB/L, no rhonchi, rales, or wheezing  CARDIOVASCULAR: RRR, no murmurs, gallops, rubs  ABDOMINAL: soft, non-tender, non-distended, positive bowel sounds   EXTREMITIES: no clubbing, cyanosis, or edema  NEUROLOGICAL: alert and oriented x 3, non-focal  SKIN: no rashes or lesions   MUSCULOSKELETAL: no gross joint deformity                          8.9    9.7   )-----------( 188      ( 18 Jun 2018 07:27 )             27.1     06-18    129<L>  |  90<L>  |  10  ----------------------------<  127<H>  3.4<L>   |  26  |  0.40<L>    Ca    8.0<L>      18 Jun 2018 07:25  Phos  3.2     06-18  Mg     1.8     06-18    TPro  6.5  /  Alb  2.0<L>  /  TBili  1.2  /  DBili  x   /  AST  104<H>  /  ALT  22  /  AlkPhos  846<H>  06-18        CAPILLARY BLOOD GLUCOSE      POCT Blood Glucose.: 281 mg/dL (18 Jun 2018 12:06)  POCT Blood Glucose.: 133 mg/dL (18 Jun 2018 08:37)  POCT Blood Glucose.: 117 mg/dL (17 Jun 2018 21:42)  POCT Blood Glucose.: 116 mg/dL (17 Jun 2018 17:35)    I&O's Summary    17 Jun 2018 07:01  -  18 Jun 2018 07:00  --------------------------------------------------------  IN: 3320 mL / OUT: 800 mL / NET: 2520 mL        MEDICATIONS  (STANDING):  ascorbic acid 500 milliGRAM(s) Oral daily  cefepime   IVPB 1000 milliGRAM(s) IV Intermittent every 8 hours  dextrose 5%. 1000 milliLiter(s) (50 mL/Hr) IV Continuous <Continuous>  dextrose 50% Injectable 12.5 Gram(s) IV Push once  dextrose 50% Injectable 25 Gram(s) IV Push once  dextrose 50% Injectable 25 Gram(s) IV Push once  docusate sodium 100 milliGRAM(s) Oral three times a day  enoxaparin Injectable 40 milliGRAM(s) SubCutaneous daily  insulin glargine Injectable (LANTUS) 6 Unit(s) SubCutaneous every morning  insulin lispro (HumaLOG) corrective regimen sliding scale   SubCutaneous three times a day before meals  insulin lispro (HumaLOG) corrective regimen sliding scale   SubCutaneous at bedtime  insulin lispro Injectable (HumaLOG) 1 Unit(s) SubCutaneous before dinner  insulin lispro Injectable (HumaLOG) 2 Unit(s) SubCutaneous before lunch  insulin lispro Injectable (HumaLOG) 3 Unit(s) SubCutaneous before breakfast  lactated ringers. 1000 milliLiter(s) (100 mL/Hr) IV Continuous <Continuous>  morphine   Solution 4 milliGRAM(s) Oral every 4 hours  multivitamin 1 Tablet(s) Oral daily  ondansetron Injectable 8 milliGRAM(s) IV Push every 8 hours  polyethylene glycol 3350 17 Gram(s) Oral daily  senna 2 Tablet(s) Oral at bedtime

## 2018-06-18 NOTE — PROGRESS NOTE ADULT - PROBLEM SELECTOR PLAN 1
-Pt still having borderline fevers  -CT chest w/o evidence of PNA  -Continuing cefepime (initiated 6/14); no need for Vancomycin this time as no suspicion for MRSA  -Ordering CT scan to r/o cholangitis given elevated Alk phos; concern for biliary statis in the setting of hepatic mets; f/u results  -

## 2018-06-18 NOTE — PROGRESS NOTE ADULT - SUBJECTIVE AND OBJECTIVE BOX
68y old  Male who presents with a chief complaint of generalized weakness (14 Jun 2018 19:35)      Interval history:  Afebrile today, continues to experience abdominal pain, was nauseous yesterday, + cough.     No Known Allergies    Antimicrobials:  cefepime   IVPB 1000 milliGRAM(s) IV Intermittent every 8 hours      REVIEW OF SYSTEMS:  No chest pain or palpitations  No SOB  No V/D,  No dysuria or frequency  No rash.     Vital Signs Last 24 Hrs  T(C): 36.8 (06-18-18 @ 14:24), Max: 37.3 (06-18-18 @ 04:18)  T(F): 98.2 (06-18-18 @ 14:24), Max: 99.1 (06-18-18 @ 04:18)  HR: 114 (06-18-18 @ 14:24) (109 - 116)  BP: 116/69 (06-18-18 @ 14:24) (104/67 - 116/69)  RR: 18 (06-18-18 @ 14:24) (17 - 18)  SpO2: 95% (06-18-18 @ 14:24) (94% - 95%)    PHYSICAL EXAM:  Patient in no acute distress. Awake, communicative.   Cardiovascular: S1S2 normal, tachycardic.   Lungs: +air entry B/L lung fields.  Gastrointestinal: softly distended, rt sided tenderness   Extremities: no edema.  IV sites not inflamed.                           8.9    9.7   )-----------( 188      ( 18 Jun 2018 07:27 )             27.1   06-18    129<L>  |  90<L>  |  10  ----------------------------<  127<H>  3.4<L>   |  26  |  0.40<L>    Ca    8.0<L>      18 Jun 2018 07:25  Phos  3.2     06-18  Mg     1.8     06-18    TPro  6.5  /  Alb  2.0<L>  /  TBili  1.2  /  DBili  x   /  AST  104<H>  /  ALT  22  /  AlkPhos  846<H>  06-18      LIVER FUNCTIONS - ( 18 Jun 2018 07:25 )  Alb: 2.0 g/dL / Pro: 6.5 g/dL / ALK PHOS: 846 U/L / ALT: 22 U/L / AST: 104 U/L / GGT: x               Culture - Blood (collected 16 Jun 2018 17:33)  Source: .Blood Blood-Peripheral  Preliminary Report (17 Jun 2018 18:01):    No growth to date.    Culture - Blood (collected 16 Jun 2018 17:33)  Source: .Blood Blood-Venous  Preliminary Report (17 Jun 2018 18:01):    No growth to date.

## 2018-06-18 NOTE — PROGRESS NOTE ADULT - PROBLEM SELECTOR PLAN 1
IV Dilaudid was switched to PO regimen yesterday  Currently tolerating Morphine 4mg Q4H ATC well  Required 1 BT over 24H  C/w Dilaudid 0.2 mg IVP Q3H PRN for severe uncontrolled pain  C/w bowel regimen (colace, miralax, senna)

## 2018-06-18 NOTE — CHART NOTE - NSCHARTNOTEFT_GEN_A_CORE
NUTRITION FOLLOW UP NOTE   Pt seen for malnutrition length of stay.     Pt is a 69 y/o male with metastatic neuroendocrine cancer p/w generalized weakness. Family asking for a few days to se Pt's progress before deciding on GOC.    Source: Patient [x]    Family [x] daughter    other [x] comprehensive chart review, WILLIAMS Flores  Pt preferred daughter at bedside translate    Diet : Consistent Carbohydrate (with evening snacks), 1000mL Fluid Restriction, Regular, 2 Glucerna daily      Patient reports [ ] nausea  [ ] vomiting [ ] diarrhea [ ] constipation  [ ]chewing problems [ ] swallowing issues  [x] other: generalized pain at time. (followed by palliative for pain management). Sipping on glucerna, requesting flavor change to strawberry.     PO intake:  75% x 1 meal, varied per family     Source for PO intake [ ] Patient [x] family [x] chart [ ] staff [ ] other      Weight (kg): 52 (06-14 @ 20:09)  No new wt to address    Pertinent Medications: MEDICATIONS  (STANDING):  ascorbic acid 500 milliGRAM(s) Oral daily  cefepime   IVPB 1000 milliGRAM(s) IV Intermittent every 8 hours  dextrose 5%. 1000 milliLiter(s) (50 mL/Hr) IV Continuous <Continuous>  dextrose 50% Injectable 12.5 Gram(s) IV Push once  dextrose 50% Injectable 25 Gram(s) IV Push once  dextrose 50% Injectable 25 Gram(s) IV Push once  docusate sodium 100 milliGRAM(s) Oral three times a day  enoxaparin Injectable 30 milliGRAM(s) SubCutaneous daily  insulin glargine Injectable (LANTUS) 6 Unit(s) SubCutaneous every morning  insulin lispro (HumaLOG) corrective regimen sliding scale   SubCutaneous three times a day before meals  insulin lispro (HumaLOG) corrective regimen sliding scale   SubCutaneous at bedtime  insulin lispro Injectable (HumaLOG) 1 Unit(s) SubCutaneous before dinner  insulin lispro Injectable (HumaLOG) 2 Unit(s) SubCutaneous before lunch  insulin lispro Injectable (HumaLOG) 3 Unit(s) SubCutaneous before breakfast  morphine   Solution 4 milliGRAM(s) Oral every 4 hours  multivitamin 1 Tablet(s) Oral daily  ondansetron Injectable 8 milliGRAM(s) IV Push every 8 hours  polyethylene glycol 3350 17 Gram(s) Oral daily  senna 2 Tablet(s) Oral at bedtime    MEDICATIONS  (PRN):  acetaminophen   Tablet 650 milliGRAM(s) Oral every 6 hours PRN For Temp greater than 38 C (100.4 F)  ALPRAZolam 0.5 milliGRAM(s) Oral every 12 hours PRN anxiety  dextrose 40% Gel 15 Gram(s) Oral once PRN Blood Glucose LESS THAN 70 milliGRAM(s)/deciliter  glucagon  Injectable 1 milliGRAM(s) IntraMuscular once PRN Glucose LESS THAN 70 milligrams/deciliter  HYDROmorphone  Injectable 0.2 milliGRAM(s) IV Push every 3 hours PRN Breakthrough pain  naloxone Injectable 0.1 milliGRAM(s) IV Push every 3 minutes PRN sedation or respiratory depression due to opioids    Pertinent Labs:  06-18 Na129 mmol/L<L> Glu 127 mg/dL<H> K+ 3.4 mmol/L<L> Cr  0.40 mg/dL<L> BUN 10 mg/dL 06-18 Phos 3.2 mg/dL 06-18 Alb 2.0 g/dL<L> 06-07 [id number] 8.2 %<H> 06-09 Chol 94 mg/dL LDL 46 mg/dL HDL 17 mg/dL<L> Trig 156 mg/dL<H>  Finger sticks: 6/18: 133mg/dL, 6/17: Finger sticks 116-220mg/dL    Skin: stage 2 pressure ulcer on sacrum. Edema: none noted.    Estimated Needs:   [x] no change since previous assessment  [ ] recalculated:       Previous Nutrition Diagnosis:  [x] Malnutrition (severe)         Nutrition Diagnosis is [x] ongoing  [ ] resolved [ ] not applicable          New Nutrition Diagnosis: [x] not applicable     Interventions:     Recommend    RD to change Glucerna flavor to strawberry.  Continue to encourage good po intake at meals   Continue to provide food preferences within diet restrictions as feasible      Monitoring and Evaluation:     [x] PO intake [x] Tolerance to diet prescription [x] weights [x] follow up per protocol    RD to remain available for further nutritional interventions as indicated/requested by medical team/pt.   Naseem Vail, RD, CDN, CDE. Pager: [phone]

## 2018-06-18 NOTE — PROGRESS NOTE ADULT - ASSESSMENT
68M PMH metastatic neuroendocrine carcinoma (unknown primary, diagnosed via iliac bone biopsy, w/ pulmonary mets, mediastinal/hilar lymphadenopathy, hepatic mets, diffuse bone mets, s/p chemo/RT w/ progression of disease), DM2 (recent admission for DKA), HTN presents from [facility] with generalized weakness found to have sepsis in likely setting of UTI; CT negative for PNA.

## 2018-06-18 NOTE — PROGRESS NOTE ADULT - ASSESSMENT
67 y/o M w/ uncontrolled Type 2 DM ketosis prone w/ HTN and HLD admitted with confusion and weakness

## 2018-06-18 NOTE — PROGRESS NOTE ADULT - PROBLEM SELECTOR PLAN 2
- + UA, will f/u urine culture (no prior urine cultures)  - c/w cefepime for now to cover for UTI and potential cholangitis

## 2018-06-18 NOTE — PROGRESS NOTE ADULT - PROBLEM SELECTOR PLAN 1
I spoke to the family and pt at length (using an  phone) regarding pt's current clinical status. Jairo has had a significant decline in performance status and is now dependent on others for ADLs. In addition he is intermittently confused and at times does not want to eat. He is mostly bed/chair bound at this time. We discussed that progressive cancer is the underlying cause of all the above changes. The family and pt are still hopeful that he can receive immunotherapy once discharged. I explained that given his condition, immunotherapy would have a greater chance of harm rather than benefit. I explained that it can only be given if he continues to improve. However, if he is unable to care for himself and condition continues to decline then supportive care with hospice services would be best.   We discussed hospice care at home. Sister is hopeful she can ultimately bring him back to [place] but I explained that he needs to be clinically well in order for this to be possible.   We will give pt a couple of days to see if he clinically improves. If he does not, recommendation will be for home hospice.   Appreciate palliative care in continuing [facility] discussions.   Updated both Dr. Camara and the medical team.

## 2018-06-18 NOTE — PROGRESS NOTE ADULT - PROBLEM SELECTOR PLAN 2
I think this is multifactorial, but I recommend an MRI brain w/ thomas to eval for possible brain mets.

## 2018-06-18 NOTE — PROGRESS NOTE ADULT - SUBJECTIVE AND OBJECTIVE BOX
Chief Complaint: f/u DM2    History:  Patient tolerating po. Eating more than he was last week. Has chronic abdominal pain, no current nausea or vomiting.     MEDICATIONS  (STANDING):  ascorbic acid 500 milliGRAM(s) Oral daily  cefepime   IVPB 1000 milliGRAM(s) IV Intermittent every 8 hours  dextrose 5%. 1000 milliLiter(s) (50 mL/Hr) IV Continuous <Continuous>  dextrose 50% Injectable 12.5 Gram(s) IV Push once  dextrose 50% Injectable 25 Gram(s) IV Push once  dextrose 50% Injectable 25 Gram(s) IV Push once  docusate sodium 100 milliGRAM(s) Oral three times a day  enoxaparin Injectable 30 milliGRAM(s) SubCutaneous daily  insulin glargine Injectable (LANTUS) 6 Unit(s) SubCutaneous every morning  insulin lispro (HumaLOG) corrective regimen sliding scale   SubCutaneous three times a day before meals  insulin lispro (HumaLOG) corrective regimen sliding scale   SubCutaneous at bedtime  insulin lispro Injectable (HumaLOG) 1 Unit(s) SubCutaneous before dinner  insulin lispro Injectable (HumaLOG) 2 Unit(s) SubCutaneous before lunch  insulin lispro Injectable (HumaLOG) 3 Unit(s) SubCutaneous before breakfast  morphine   Solution 4 milliGRAM(s) Oral every 4 hours  multivitamin 1 Tablet(s) Oral daily  ondansetron Injectable 8 milliGRAM(s) IV Push every 8 hours  polyethylene glycol 3350 17 Gram(s) Oral daily  senna 2 Tablet(s) Oral at bedtime    MEDICATIONS  (PRN):  acetaminophen   Tablet 650 milliGRAM(s) Oral every 6 hours PRN For Temp greater than 38 C (100.4 F)  ALPRAZolam 0.5 milliGRAM(s) Oral every 12 hours PRN anxiety  dextrose 40% Gel 15 Gram(s) Oral once PRN Blood Glucose LESS THAN 70 milliGRAM(s)/deciliter  glucagon  Injectable 1 milliGRAM(s) IntraMuscular once PRN Glucose LESS THAN 70 milligrams/deciliter  HYDROmorphone  Injectable 0.2 milliGRAM(s) IV Push every 3 hours PRN Breakthrough pain  naloxone Injectable 0.1 milliGRAM(s) IV Push every 3 minutes PRN sedation or respiratory depression due to opioids      Allergies  No Known Allergies    Review of Systems:  Constitutional: No fever  Cardiovascular: No chest pain, palpitations  Respiratory: No SOB, no cough  GI: No nausea, vomiting, + abdominal pain    ALL OTHER SYSTEMS REVIEWED AND NEGATIVE    PHYSICAL EXAM:  VITALS: T(C): 37.3 (06-18-18 @ 04:18)  T(F): 99.1 (06-18-18 @ 04:18), Max: 99.1 (06-18-18 @ 04:18)  HR: 109 (06-18-18 @ 04:18) (109 - 116)  BP: 104/67 (06-18-18 @ 04:18) (104/67 - 110/70)  RR:  (17 - 18)  SpO2:  (94% - 95%)  Wt(kg): --  GENERAL: NAD, well-developed  RESPIRATORY: Clear to auscultation bilaterally; No rales, rhonchi, wheezing, or rubs  CARDIOVASCULAR: Regular rate and rhythm; No murmurs  GI: Soft, nontender, distended abdomen  PSYCH: Alert and oriented x 3, reactive affect    POCT Blood Glucose.: 281 mg/dL (06-18-18 @ 12:06) H 2, 3   POCT Blood Glucose.: 133 mg/dL (06-18-18 @ 08:37) L 6, H 3  POCT Blood Glucose.: 117 mg/dL (06-17-18 @ 21:42)  POCT Blood Glucose.: 116 mg/dL (06-17-18 @ 17:35) H 1  POCT Blood Glucose.: 220 mg/dL (06-17-18 @ 13:26) H 2, H 2  POCT Blood Glucose.: 169 mg/dL (06-17-18 @ 08:42) L6, H 3, H 1  POCT Blood Glucose.: 162 mg/dL (06-16-18 @ 21:45)  POCT Blood Glucose.: 192 mg/dL (06-16-18 @ 17:35)  POCT Blood Glucose.: 200 mg/dL (06-16-18 @ 13:08)  POCT Blood Glucose.: 175 mg/dL (06-16-18 @ 08:53)  POCT Blood Glucose.: 185 mg/dL (06-16-18 @ 00:52)  POCT Blood Glucose.: 253 mg/dL (06-15-18 @ 22:22)  POCT Blood Glucose.: 86 mg/dL (06-15-18 @ 21:50)  POCT Blood Glucose.: 76 mg/dL (06-15-18 @ 21:27)  POCT Blood Glucose.: 257 mg/dL (06-15-18 @ 17:31)      06-18    129<L>  |  90<L>  |  10  ----------------------------<  127<H>  3.4<L>   |  26  |  0.40<L>    EGFR if : 143  EGFR if non : 124    Ca    8.0<L>      06-18  Mg     1.8     06-18  Phos  3.2     06-18    TPro  6.5  /  Alb  2.0<L>  /  TBili  1.2  /  DBili  x   /  AST  104<H>  /  ALT  22  /  AlkPhos  846<H>  06-18          Thyroid Function Tests:  06-09 @ 08:24 TSH 1.90 FreeT4 -- T3 -- Anti TPO -- Anti Thyroglobulin Ab -- TSI --      Hemoglobin A1C, Whole Blood: 8.2 % <H> [4.0 - 5.6] (06-07-18 @ 03:08) Chief Complaint: f/u DM2    History:  Patient tolerating po. Eating more than he was last week. Has chronic abdominal pain, no current nausea or vomiting.     MEDICATIONS  (STANDING):  ascorbic acid 500 milliGRAM(s) Oral daily  cefepime   IVPB 1000 milliGRAM(s) IV Intermittent every 8 hours  dextrose 5%. 1000 milliLiter(s) (50 mL/Hr) IV Continuous <Continuous>  dextrose 50% Injectable 12.5 Gram(s) IV Push once  dextrose 50% Injectable 25 Gram(s) IV Push once  dextrose 50% Injectable 25 Gram(s) IV Push once  docusate sodium 100 milliGRAM(s) Oral three times a day  enoxaparin Injectable 30 milliGRAM(s) SubCutaneous daily  insulin glargine Injectable (LANTUS) 6 Unit(s) SubCutaneous every morning  insulin lispro (HumaLOG) corrective regimen sliding scale   SubCutaneous three times a day before meals  insulin lispro (HumaLOG) corrective regimen sliding scale   SubCutaneous at bedtime  insulin lispro Injectable (HumaLOG) 1 Unit(s) SubCutaneous before dinner  insulin lispro Injectable (HumaLOG) 2 Unit(s) SubCutaneous before lunch  insulin lispro Injectable (HumaLOG) 3 Unit(s) SubCutaneous before breakfast  morphine   Solution 4 milliGRAM(s) Oral every 4 hours  multivitamin 1 Tablet(s) Oral daily  ondansetron Injectable 8 milliGRAM(s) IV Push every 8 hours  polyethylene glycol 3350 17 Gram(s) Oral daily  senna 2 Tablet(s) Oral at bedtime    MEDICATIONS  (PRN):  acetaminophen   Tablet 650 milliGRAM(s) Oral every 6 hours PRN For Temp greater than 38 C (100.4 F)  ALPRAZolam 0.5 milliGRAM(s) Oral every 12 hours PRN anxiety  dextrose 40% Gel 15 Gram(s) Oral once PRN Blood Glucose LESS THAN 70 milliGRAM(s)/deciliter  glucagon  Injectable 1 milliGRAM(s) IntraMuscular once PRN Glucose LESS THAN 70 milligrams/deciliter  HYDROmorphone  Injectable 0.2 milliGRAM(s) IV Push every 3 hours PRN Breakthrough pain  naloxone Injectable 0.1 milliGRAM(s) IV Push every 3 minutes PRN sedation or respiratory depression due to opioids      Allergies  No Known Allergies    Review of Systems:  Constitutional: No fever  Cardiovascular: No chest pain, palpitations  Respiratory: No SOB, no cough  GI: No nausea, vomiting, + abdominal pain    ALL OTHER SYSTEMS REVIEWED AND NEGATIVE    PHYSICAL EXAM:  VITALS: T(C): 37.3 (06-18-18 @ 04:18)  T(F): 99.1 (06-18-18 @ 04:18), Max: 99.1 (06-18-18 @ 04:18)  HR: 109 (06-18-18 @ 04:18) (109 - 116)  BP: 104/67 (06-18-18 @ 04:18) (104/67 - 110/70)  RR:  (17 - 18)  SpO2:  (94% - 95%)  Wt(kg): --  GENERAL: NAD, well-developed  RESPIRATORY: Clear to auscultation bilaterally; No rales, rhonchi, wheezing, or rubs  CARDIOVASCULAR: Regular rate and rhythm; No murmurs  GI: Soft, nontender, distended abdomen  PSYCH: Alert and oriented x 3, reactive affect    POCT Blood Glucose.: 281 mg/dL (06-18-18 @ 12:06) H 2, 3   POCT Blood Glucose.: 133 mg/dL (06-18-18 @ 08:37) L 6, H 3  POCT Blood Glucose.: 117 mg/dL (06-17-18 @ 21:42)  POCT Blood Glucose.: 116 mg/dL (06-17-18 @ 17:35) H 1  POCT Blood Glucose.: 220 mg/dL (06-17-18 @ 13:26) H 2, H 2  POCT Blood Glucose.: 169 mg/dL (06-17-18 @ 08:42) L6, H 3, H 1  POCT Blood Glucose.: 162 mg/dL (06-16-18 @ 21:45)  POCT Blood Glucose.: 192 mg/dL (06-16-18 @ 17:35)  POCT Blood Glucose.: 200 mg/dL (06-16-18 @ 13:08)  POCT Blood Glucose.: 175 mg/dL (06-16-18 @ 08:53)  POCT Blood Glucose.: 185 mg/dL (06-16-18 @ 00:52)  POCT Blood Glucose.: 253 mg/dL (06-15-18 @ 22:22)  POCT Blood Glucose.: 86 mg/dL (06-15-18 @ 21:50)  POCT Blood Glucose.: 76 mg/dL (06-15-18 @ 21:27)  POCT Blood Glucose.: 257 mg/dL (06-15-18 @ 17:31)      06-18    129<L>  |  90<L>  |  10  ----------------------------<  127<H>  3.4<L>   |  26  |  0.40<L>    EGFR if : 143  EGFR if non : 124    Ca    8.0<L>      06-18  Mg     1.8     06-18  Phos  3.2     06-18    TPro  6.5  /  Alb  2.0<L>  /  TBili  1.2  /  DBili  x   /  AST  104<H>  /  ALT  22  /  AlkPhos  846<H>  06-18          Thyroid Function Tests:  06-09 @ 08:24 TSH 1.90   Hemoglobin A1C, Whole Blood: 8.2 % <H> [4.0 - 5.6] (06-07-18 @ 03:08)

## 2018-06-18 NOTE — PROGRESS NOTE ADULT - ASSESSMENT
68 M w/ metastatic high grade neuroendocrine ca involving liver, bone and lung. Pt is s/p 6 cycles Carbo/Etoposide completed in 3/18, then RT to C and L spine bony lesions, noted to have POD and decrease in functional status. Plan for combined immunotherapy as an out pt but a/w FTT, uncontrolled pain and confusion

## 2018-06-18 NOTE — PROGRESS NOTE ADULT - ASSESSMENT
68M PMH metastatic neuroendocrine carcinoma (unknown primary, diagnosed via iliac bone biopsy, w/ pulmonary mets, mediastinal/hilar lymphadenopathy, hepatic mets, diffuse bone mets, s/p chemo/RT w/ progression of disease), DM2 (recent admission for DKA), HTN presents from [facility] with generalized weakness and abdominal pain. Found to have sepeis( fever with leukocytosis and tachycardia) in the ED. Source likely urine( less likely) vs intra abdominal vs occult source vs malignancy. Patient with metastatic neuroendocrine tumor with mets to bone/liver hence with increased AST/ALK phos. Ct chest reveals small bilateral effusions with R atelectesis likely represent malignant effusions. Receiving IV vanco/cefepime  for sepsis like presentation. Urine culture now with candida.   Overall sepsis ( fever, tachycardia, leucocytosis ) ? cholestasis given worsening hepatic mets vs tumor fever.   Resolved sepsis.     Recommend  --Fu final blood cultures  --Continue with Cefepime, can increase  dose to 1g q8  --Abdominal pain/discomfort likely 2/2 metastatic disease vs cholestasis  --Consider Ct abdomen for the evaluation of pain/fever

## 2018-06-18 NOTE — PROGRESS NOTE ADULT - SUBJECTIVE AND OBJECTIVE BOX
INTERVAL HPI/OVERNIGHT EVENTS: Patient is calm and cooperative at bedside not in acute distress. Used one IV breakthrough for pain in 24H. Family including patients nephew, sister, and daughter Shanel. As per daughter, was just visited by Dr. Dick from heme/onc.     Allergies  No Known Allergies  Intolerances    ADVANCE DIRECTIVES:  FULL CODE  DNR [x]NO  MOLST [ ] YES [x ] NO     MEDICATIONS  (STANDING):  ascorbic acid 500 milliGRAM(s) Oral daily  cefepime   IVPB 1000 milliGRAM(s) IV Intermittent every 8 hours  dextrose 5%. 1000 milliLiter(s) (50 mL/Hr) IV Continuous <Continuous>  dextrose 50% Injectable 12.5 Gram(s) IV Push once  dextrose 50% Injectable 25 Gram(s) IV Push once  dextrose 50% Injectable 25 Gram(s) IV Push once  docusate sodium 100 milliGRAM(s) Oral three times a day  enoxaparin Injectable 30 milliGRAM(s) SubCutaneous daily  insulin glargine Injectable (LANTUS) 6 Unit(s) SubCutaneous every morning  insulin lispro (HumaLOG) corrective regimen sliding scale   SubCutaneous three times a day before meals  insulin lispro (HumaLOG) corrective regimen sliding scale   SubCutaneous at bedtime  insulin lispro Injectable (HumaLOG) 3 Unit(s) SubCutaneous before breakfast  insulin lispro Injectable (HumaLOG) 1 Unit(s) SubCutaneous before dinner  insulin lispro Injectable (HumaLOG) 2 Unit(s) SubCutaneous before lunch  morphine   Solution 4 milliGRAM(s) Oral every 4 hours  multivitamin 1 Tablet(s) Oral daily  ondansetron Injectable 8 milliGRAM(s) IV Push every 8 hours  polyethylene glycol 3350 17 Gram(s) Oral daily  senna 2 Tablet(s) Oral at bedtime    MEDICATIONS  (PRN):  acetaminophen   Tablet 650 milliGRAM(s) Oral every 6 hours PRN For Temp greater than 38 C (100.4 F)  ALPRAZolam 0.5 milliGRAM(s) Oral every 12 hours PRN anxiety  dextrose 40% Gel 15 Gram(s) Oral once PRN Blood Glucose LESS THAN 70 milliGRAM(s)/deciliter  glucagon  Injectable 1 milliGRAM(s) IntraMuscular once PRN Glucose LESS THAN 70 milligrams/deciliter  HYDROmorphone  Injectable 0.2 milliGRAM(s) IV Push every 3 hours PRN Breakthrough pain  naloxone Injectable 0.1 milliGRAM(s) IV Push every 3 minutes PRN sedation or respiratory depression due to opioids    PRESENT SYMPTOMS:  Source: [x ] Patient   [x ] Family   [ ] Team     Pain:                        [ ] No [ x] Yes             [x ] Mild [ ] Moderate [ ] Severe  On palpation, RLQ pain.     Onset - on deep palpation   Location - RLQ  Duration - intermittent  Character - sharp  Alleviating/Aggravating - aggravated or apparent w/ palpation  Radiation -   Timing -    Dyspnea:                [x] No [ ] Yes             [ ] Mild [ ] Moderate [ ] Severe    Anxiety:                  [x ] No [ ] Yes             [ ] Mild [ ] Moderate [ ] Severe    Fatigue:                  [ ] No [x] Yes             [x ] Mild [ ] Moderate [ ] Severe    Nausea:                  [  ] No [x] Yes             [ x] Mild [ ] Moderate [ ] Severe    Loss of appetite:   [ ] No [x] Yes             [x ] Mild [ ] Moderate [ ] Severe    Constipation:        [x] No [ ] Yes             [ ] Mild [ ] Moderate [ ] Severe    Other Symptoms:  [x ] All other review of systems negative   [ ] Unable to obtain due to poor mentation     Karnofsky Performance Score/Palliative Performance Status Version 2:  30  %    PHYSICAL EXAM:  Vital Signs Last 24 Hrs  T(C): 37.3 (18 Jun 2018 04:18), Max: 37.8 (17 Jun 2018 12:42)  T(F): 99.1 (18 Jun 2018 04:18), Max: 100 (17 Jun 2018 12:42)  HR: 109 (18 Jun 2018 04:18) (109 - 120)  BP: 104/67 (18 Jun 2018 04:18) (104/67 - 113/76)  BP(mean): --  RR: 17 (18 Jun 2018 04:18) (17 - 20)  SpO2: 95% (18 Jun 2018 04:18) (94% - 95%) I&O's Summary    17 Jun 2018 07:01  -  18 Jun 2018 07:00  --------------------------------------------------------  IN: 3320 mL / OUT: 800 mL / NET: 2520 mL    General:  [x ] Alert  [ ] Oriented x  2    [ ] Lethargic  [ ] Agitated   [ ] Cachexia   [ ] Unarousable  [x ] Verbal  [ ] Non-Verbal    HEENT:  [x ] Normal   [ ] Dry mouth   [ ] ET Tube    [ ] Trach  [ ] Oral lesions    Lungs:   [x ] Clear [ ] Tachypnea  [ ] Audible excessive secretions   [ ] Rhonchi        [ ] Right [ ] Left [ ] Bilateral  [ ] Crackles        [ ] Right [ ] Left [ ] Bilateral  [ ] Wheezing     [ ] Right [ ] Left [ ] Bilateral    Cardiovascular:  [x ] Regular [ ] Irregular [ ] Tachycardia   [ ] Bradycardia  [ ] Murmur [ ] Other    Abdomen: [ x] Soft  [ ] Distended   [ ] +BS  [ ] Non tender [ ] Tender  [ ]PEG   [ ]OGT/ NGT   Last BM:  6/17 as per patient    Genitourinary: [ ] Normal [x ] Incontinent   [ ] Oliguria/Anuria   [ ] Moore    Musculoskeletal:  [ ] Normal   [ ] Weakness  [ x] Bedbound/Wheelchair bound [ ] Edema    Neurological: [ x] No focal deficits  [ ] Cognitive impairment  [ ] Dysphagia [ ] Dysarthria [ ] Paresis [ ] Other     Skin: [ ] Normal   [x ] Pressure ulcer(s)                  [ ] Rash    LABS:                        8.9    9.7   )-----------( 188      ( 18 Jun 2018 07:27 )             27.1     06-18    129<L>  |  90<L>  |  10  ----------------------------<  127<H>  3.4<L>   |  26  |  0.40<L>    Ca    8.0<L>      18 Jun 2018 07:25  Phos  3.2     06-18  Mg     1.8     06-18    TPro  6.5  /  Alb  2.0<L>  /  TBili  1.2  /  DBili  x   /  AST  104<H>  /  ALT  22  /  AlkPhos  846<H>  06-18    Shock: [ ] Septic [ ] Cardiogenic [ ] Neurologic [ ] Hypovolemic  Vasopressors x   Inotrophs x     Oral Intake: [ ] Unable/mouth care only [x ] Minimal [ ] Moderate [ ] Full Capability    Diet: [ ] NPO [ ] Tube feeds [ ] TPN [ ] Other     RADIOLOGY & ADDITIONAL STUDIES:    REFERRALS:   [ ] Chaplaincy  [ ] Hospice  [ ] Child Life  [ ] Social Work  [ ] Case management [ ] Holistic Therapy   Jasvir Camara MD [phone]

## 2018-06-18 NOTE — PROGRESS NOTE ADULT - PROBLEM SELECTOR PLAN 6
- stage IV neuroendocrine cancer w/ pulmonary mets, mediastinal / hilar lymphadenopathy, hepatic mets, bone mets  - recent CT chest, A/P demonstrated progression of disease  -Onc following  - Per onc, patient may not be a candidate for future disease modifying therapy given POD and failure to thrive  -Palliative consult appreciated for symptom management. GOC discussion ongoing

## 2018-06-18 NOTE — PROGRESS NOTE ADULT - PROBLEM SELECTOR PLAN 1
- increase Pre-breakfast Humalog to 4  - c/w Humalog 2 units before lunch and 1 unit before dinner  - c/w Lantus 6 units qAM and low correction scale  - consistent carb diet   - will follow  - for discharge: discharged on Lantus and Prandin

## 2018-06-18 NOTE — PROGRESS NOTE ADULT - SUBJECTIVE AND OBJECTIVE BOX
Chief Complaint: metastatic high grade neuroendocrine tumor    INTERVAL HPI/OVERNIGHT EVENTS: More alert today per family. Eating breakfast, sitting up in a chair. Pain is controlled currently. Remains very weak, requiring 1 person assistance to ambulate slowly.     MEDICATIONS  (STANDING):  ascorbic acid 500 milliGRAM(s) Oral daily  cefepime   IVPB 1000 milliGRAM(s) IV Intermittent every 8 hours  dextrose 5%. 1000 milliLiter(s) (50 mL/Hr) IV Continuous <Continuous>  dextrose 50% Injectable 12.5 Gram(s) IV Push once  dextrose 50% Injectable 25 Gram(s) IV Push once  dextrose 50% Injectable 25 Gram(s) IV Push once  docusate sodium 100 milliGRAM(s) Oral three times a day  dronabinol 2.5 milliGRAM(s) Oral daily  enoxaparin Injectable 40 milliGRAM(s) SubCutaneous daily  HYDROmorphone  Injectable 0.2 milliGRAM(s) IV Push every 6 hours  insulin glargine Injectable (LANTUS) 5 Unit(s) SubCutaneous every morning  insulin lispro (HumaLOG) corrective regimen sliding scale   SubCutaneous three times a day before meals  insulin lispro (HumaLOG) corrective regimen sliding scale   SubCutaneous at bedtime  insulin lispro Injectable (HumaLOG) 2 Unit(s) SubCutaneous before lunch  insulin lispro Injectable (HumaLOG) 2 Unit(s) SubCutaneous before breakfast  insulin lispro Injectable (HumaLOG) 1 Unit(s) SubCutaneous before dinner  lactated ringers. 1000 milliLiter(s) (100 mL/Hr) IV Continuous <Continuous>  multivitamin 1 Tablet(s) Oral daily  ondansetron Injectable 8 milliGRAM(s) IV Push every 8 hours  polyethylene glycol 3350 17 Gram(s) Oral daily  senna 3 Tablet(s) Oral at bedtime    MEDICATIONS  (PRN):  acetaminophen   Tablet 650 milliGRAM(s) Oral every 6 hours PRN For Temp greater than 38 C (100.4 F)  bisacodyl Suppository 10 milliGRAM(s) Rectal daily PRN Constipation  dextrose 40% Gel 15 Gram(s) Oral once PRN Blood Glucose LESS THAN 70 milliGRAM(s)/deciliter  glucagon  Injectable 1 milliGRAM(s) IntraMuscular once PRN Glucose LESS THAN 70 milligrams/deciliter  HYDROmorphone  Injectable 0.2 milliGRAM(s) IV Push every 2 hours PRN Breakthrough pain  LORazepam   Injectable 0.2 milliGRAM(s) IV Push every 12 hours PRN Anxiety  naloxone Injectable 0.1 milliGRAM(s) IV Push every 3 minutes PRN sedation or respiratory depression due to opioids      Allergies    No Known Allergies    Intolerances        ROS: as above     Vital Signs Last 24 Hrs  T(C): 36.8 (19 Jun 2018 14:19), Max: 37.1 (18 Jun 2018 21:33)  T(F): 98.2 (19 Jun 2018 14:19), Max: 98.8 (18 Jun 2018 21:33)  HR: 117 (19 Jun 2018 14:19) (108 - 117)  BP: 127/78 (19 Jun 2018 14:19) (122/72 - 127/78)  BP(mean): --  RR: 18 (19 Jun 2018 14:19) (18 - 20)  SpO2: 95% (19 Jun 2018 14:19) (92% - 95%)    Physical Exam:   AAO x 2-3, NAD  cachectic   RRR S1S2  CTA b/l   soft NTNDBS+  no c/c/e    LABS:                        8.9    9.7   )-----------( 218      ( 19 Jun 2018 07:00 )             27.0     06-19    131<L>  |  91<L>  |  11  ----------------------------<  83  3.8   |  29  |  0.40<L>    Ca    8.0<L>      19 Jun 2018 07:00  Phos  3.1     06-19  Mg     1.8     06-19    TPro  6.5  /  Alb  2.0<L>  /  TBili  1.2  /  DBili  x   /  AST  104<H>  /  ALT  22  /  AlkPhos  846<H>  06-18

## 2018-06-18 NOTE — PROGRESS NOTE ADULT - PROBLEM SELECTOR PLAN 4
Full Code  Patient and family seen at bedside. Would like few days to see patients progress before deciding which path they would want to follow (i.e treatment vs symptomatic care and hospice only).   Currently patient is not in acute distress and pain is controlled.  All questions regarding transition to PO pain medication addressed.   Will continue to follow for ongoing GOC discussions.

## 2018-06-18 NOTE — PROGRESS NOTE ADULT - PROBLEM SELECTOR PLAN 2
ECOG score 4, KPS 30%  As per daughter at bedside, seen by Dr. Dick this AM  Will await till Thursday 6/21 to reassess if patient can tolerate DMT  If patient unable to tolerate further treatment, family is in agreement for inpatient hospice. Will c/w ongoing GOC. ECOG score 4, KPS 30%  As per daughter at bedside, seen by Dr. Dick this AM  Will await till Thursday 6/21 to reassess if patient can tolerate DMT  If patient unable to tolerate further treatment, family/patient are willing to further discuss hospice. Will c/w ongoing GOC.

## 2018-06-18 NOTE — PROGRESS NOTE ADULT - ASSESSMENT
Patient is a 69 y/o M with significant PMH of metastatic neuroendocrine carcinoma (unknown primary, diagnosed via iliac bone biopsy, w/ pulmonary mets, mediastinal/hilar lymphadenopathy, hepatic mets, diffuse bone mets, s/p chemo/RT w/ progression of disease), DM2 (recent admission for DKA), HTN presented from [facility] with generalized weakness with sepsis 2/2 PNA vs. UTI.  Palliative care following for pain management and ongoing GOC.

## 2018-06-18 NOTE — PROGRESS NOTE ADULT - PROBLEM SELECTOR PLAN 3
LLL vs UTI in recently hospitalized patient.    ID following for abx adjustment. Currently vanco d/c'ed and cefepime adjusted   WBC improving, if source of infection still unclear; will need to readdress GOC for abx treatment for reversible and irreversible causes as this may be tumor burden. LLL vs UTI in recently hospitalized patient vs Tumor fevers   ID following for abx adjustment. Currently vanco d/c'ed and cefepime adjusted   WBC improving, if source of infection still unclear; will need to readdress GOC for abx treatment for reversible and irreversible causes as this may be tumor burden.

## 2018-06-19 DIAGNOSIS — K59.00 CONSTIPATION, UNSPECIFIED: ICD-10-CM

## 2018-06-19 LAB
AMMONIA BLD-MCNC: 38 UMOL/L — SIGNIFICANT CHANGE UP (ref 11–55)
ANION GAP SERPL CALC-SCNC: 11 MMOL/L — SIGNIFICANT CHANGE UP (ref 5–17)
BUN SERPL-MCNC: 11 MG/DL — SIGNIFICANT CHANGE UP (ref 7–23)
CALCIUM SERPL-MCNC: 8 MG/DL — LOW (ref 8.4–10.5)
CHLORIDE SERPL-SCNC: 91 MMOL/L — LOW (ref 96–108)
CO2 SERPL-SCNC: 29 MMOL/L — SIGNIFICANT CHANGE UP (ref 22–31)
CREAT SERPL-MCNC: 0.4 MG/DL — LOW (ref 0.5–1.3)
CULTURE RESULTS: SIGNIFICANT CHANGE UP
CULTURE RESULTS: SIGNIFICANT CHANGE UP
GLUCOSE BLDC GLUCOMTR-MCNC: 101 MG/DL — HIGH (ref 70–99)
GLUCOSE BLDC GLUCOMTR-MCNC: 106 MG/DL — HIGH (ref 70–99)
GLUCOSE BLDC GLUCOMTR-MCNC: 125 MG/DL — HIGH (ref 70–99)
GLUCOSE BLDC GLUCOMTR-MCNC: 60 MG/DL — LOW (ref 70–99)
GLUCOSE BLDC GLUCOMTR-MCNC: 68 MG/DL — LOW (ref 70–99)
GLUCOSE BLDC GLUCOMTR-MCNC: 78 MG/DL — SIGNIFICANT CHANGE UP (ref 70–99)
GLUCOSE BLDC GLUCOMTR-MCNC: 94 MG/DL — SIGNIFICANT CHANGE UP (ref 70–99)
GLUCOSE SERPL-MCNC: 83 MG/DL — SIGNIFICANT CHANGE UP (ref 70–99)
HCT VFR BLD CALC: 27 % — LOW (ref 39–50)
HGB BLD-MCNC: 8.9 G/DL — LOW (ref 13–17)
MAGNESIUM SERPL-MCNC: 1.8 MG/DL — SIGNIFICANT CHANGE UP (ref 1.6–2.6)
MCHC RBC-ENTMCNC: 27.6 PG — SIGNIFICANT CHANGE UP (ref 27–34)
MCHC RBC-ENTMCNC: 32.9 GM/DL — SIGNIFICANT CHANGE UP (ref 32–36)
MCV RBC AUTO: 83.9 FL — SIGNIFICANT CHANGE UP (ref 80–100)
PHOSPHATE SERPL-MCNC: 3.1 MG/DL — SIGNIFICANT CHANGE UP (ref 2.5–4.5)
PLATELET # BLD AUTO: 218 K/UL — SIGNIFICANT CHANGE UP (ref 150–400)
POTASSIUM SERPL-MCNC: 3.8 MMOL/L — SIGNIFICANT CHANGE UP (ref 3.5–5.3)
POTASSIUM SERPL-SCNC: 3.8 MMOL/L — SIGNIFICANT CHANGE UP (ref 3.5–5.3)
RBC # BLD: 3.22 M/UL — LOW (ref 4.2–5.8)
RBC # FLD: 17.5 % — HIGH (ref 10.3–14.5)
SODIUM SERPL-SCNC: 131 MMOL/L — LOW (ref 135–145)
SPECIMEN SOURCE: SIGNIFICANT CHANGE UP
SPECIMEN SOURCE: SIGNIFICANT CHANGE UP
WBC # BLD: 9.7 K/UL — SIGNIFICANT CHANGE UP (ref 3.8–10.5)
WBC # FLD AUTO: 9.7 K/UL — SIGNIFICANT CHANGE UP (ref 3.8–10.5)

## 2018-06-19 PROCEDURE — 99232 SBSQ HOSP IP/OBS MODERATE 35: CPT

## 2018-06-19 PROCEDURE — 99232 SBSQ HOSP IP/OBS MODERATE 35: CPT | Mod: GC

## 2018-06-19 PROCEDURE — 99233 SBSQ HOSP IP/OBS HIGH 50: CPT | Mod: GC

## 2018-06-19 PROCEDURE — 99497 ADVNCD CARE PLAN 30 MIN: CPT | Mod: 25

## 2018-06-19 PROCEDURE — 99233 SBSQ HOSP IP/OBS HIGH 50: CPT

## 2018-06-19 RX ORDER — INSULIN LISPRO 100/ML
2 VIAL (ML) SUBCUTANEOUS
Qty: 0 | Refills: 0 | Status: DISCONTINUED | OUTPATIENT
Start: 2018-06-19 | End: 2018-06-22

## 2018-06-19 RX ORDER — DRONABINOL 2.5 MG
2.5 CAPSULE ORAL DAILY
Qty: 0 | Refills: 0 | Status: DISCONTINUED | OUTPATIENT
Start: 2018-06-19 | End: 2018-06-22

## 2018-06-19 RX ORDER — HYDROMORPHONE HYDROCHLORIDE 2 MG/ML
0.2 INJECTION INTRAMUSCULAR; INTRAVENOUS; SUBCUTANEOUS EVERY 4 HOURS
Qty: 0 | Refills: 0 | Status: DISCONTINUED | OUTPATIENT
Start: 2018-06-19 | End: 2018-06-19

## 2018-06-19 RX ORDER — INSULIN LISPRO 100/ML
2 VIAL (ML) SUBCUTANEOUS
Qty: 0 | Refills: 0 | Status: DISCONTINUED | OUTPATIENT
Start: 2018-06-19 | End: 2018-06-20

## 2018-06-19 RX ORDER — HYDROMORPHONE HYDROCHLORIDE 2 MG/ML
0.2 INJECTION INTRAMUSCULAR; INTRAVENOUS; SUBCUTANEOUS EVERY 6 HOURS
Qty: 0 | Refills: 0 | Status: DISCONTINUED | OUTPATIENT
Start: 2018-06-19 | End: 2018-06-20

## 2018-06-19 RX ORDER — SENNA PLUS 8.6 MG/1
3 TABLET ORAL AT BEDTIME
Qty: 0 | Refills: 0 | Status: DISCONTINUED | OUTPATIENT
Start: 2018-06-19 | End: 2018-06-21

## 2018-06-19 RX ORDER — HYDROMORPHONE HYDROCHLORIDE 2 MG/ML
0.2 INJECTION INTRAMUSCULAR; INTRAVENOUS; SUBCUTANEOUS
Qty: 0 | Refills: 0 | Status: DISCONTINUED | OUTPATIENT
Start: 2018-06-19 | End: 2018-06-20

## 2018-06-19 RX ORDER — INSULIN LISPRO 100/ML
1 VIAL (ML) SUBCUTANEOUS
Qty: 0 | Refills: 0 | Status: DISCONTINUED | OUTPATIENT
Start: 2018-06-19 | End: 2018-06-22

## 2018-06-19 RX ORDER — INSULIN GLARGINE 100 [IU]/ML
5 INJECTION, SOLUTION SUBCUTANEOUS EVERY MORNING
Qty: 0 | Refills: 0 | Status: DISCONTINUED | OUTPATIENT
Start: 2018-06-19 | End: 2018-06-22

## 2018-06-19 RX ORDER — INSULIN LISPRO 100/ML
1 VIAL (ML) SUBCUTANEOUS
Qty: 0 | Refills: 0 | Status: DISCONTINUED | OUTPATIENT
Start: 2018-06-19 | End: 2018-06-19

## 2018-06-19 RX ORDER — DEXTROSE 50 % IN WATER 50 %
15 SYRINGE (ML) INTRAVENOUS ONCE
Qty: 0 | Refills: 0 | Status: COMPLETED | OUTPATIENT
Start: 2018-06-19 | End: 2018-06-19

## 2018-06-19 RX ORDER — SODIUM CHLORIDE 9 MG/ML
1000 INJECTION, SOLUTION INTRAVENOUS
Qty: 0 | Refills: 0 | Status: DISCONTINUED | OUTPATIENT
Start: 2018-06-19 | End: 2018-06-22

## 2018-06-19 RX ADMIN — Medication 100 MILLIGRAM(S): at 22:09

## 2018-06-19 RX ADMIN — SENNA PLUS 3 TABLET(S): 8.6 TABLET ORAL at 22:09

## 2018-06-19 RX ADMIN — ONDANSETRON 8 MILLIGRAM(S): 8 TABLET, FILM COATED ORAL at 22:09

## 2018-06-19 RX ADMIN — INSULIN GLARGINE 6 UNIT(S): 100 INJECTION, SOLUTION SUBCUTANEOUS at 09:10

## 2018-06-19 RX ADMIN — Medication 500 MILLIGRAM(S): at 13:05

## 2018-06-19 RX ADMIN — POLYETHYLENE GLYCOL 3350 17 GRAM(S): 17 POWDER, FOR SOLUTION ORAL at 13:05

## 2018-06-19 RX ADMIN — ENOXAPARIN SODIUM 40 MILLIGRAM(S): 100 INJECTION SUBCUTANEOUS at 13:05

## 2018-06-19 RX ADMIN — Medication 2.5 MILLIGRAM(S): at 17:48

## 2018-06-19 RX ADMIN — SODIUM CHLORIDE 100 MILLILITER(S): 9 INJECTION, SOLUTION INTRAVENOUS at 09:09

## 2018-06-19 RX ADMIN — HYDROMORPHONE HYDROCHLORIDE 0.2 MILLIGRAM(S): 2 INJECTION INTRAMUSCULAR; INTRAVENOUS; SUBCUTANEOUS at 13:16

## 2018-06-19 RX ADMIN — MORPHINE SULFATE 4 MILLIGRAM(S): 50 CAPSULE, EXTENDED RELEASE ORAL at 09:56

## 2018-06-19 RX ADMIN — MORPHINE SULFATE 4 MILLIGRAM(S): 50 CAPSULE, EXTENDED RELEASE ORAL at 02:30

## 2018-06-19 RX ADMIN — HYDROMORPHONE HYDROCHLORIDE 0.2 MILLIGRAM(S): 2 INJECTION INTRAMUSCULAR; INTRAVENOUS; SUBCUTANEOUS at 20:04

## 2018-06-19 RX ADMIN — Medication 1 TABLET(S): at 13:05

## 2018-06-19 RX ADMIN — ONDANSETRON 8 MILLIGRAM(S): 8 TABLET, FILM COATED ORAL at 05:55

## 2018-06-19 RX ADMIN — MORPHINE SULFATE 4 MILLIGRAM(S): 50 CAPSULE, EXTENDED RELEASE ORAL at 06:30

## 2018-06-19 RX ADMIN — Medication 2 UNIT(S): at 14:06

## 2018-06-19 RX ADMIN — Medication 100 MILLIGRAM(S): at 13:06

## 2018-06-19 RX ADMIN — CEFEPIME 100 MILLIGRAM(S): 1 INJECTION, POWDER, FOR SOLUTION INTRAMUSCULAR; INTRAVENOUS at 13:13

## 2018-06-19 RX ADMIN — MORPHINE SULFATE 4 MILLIGRAM(S): 50 CAPSULE, EXTENDED RELEASE ORAL at 05:55

## 2018-06-19 RX ADMIN — ONDANSETRON 8 MILLIGRAM(S): 8 TABLET, FILM COATED ORAL at 13:06

## 2018-06-19 RX ADMIN — CEFEPIME 100 MILLIGRAM(S): 1 INJECTION, POWDER, FOR SOLUTION INTRAMUSCULAR; INTRAVENOUS at 05:55

## 2018-06-19 RX ADMIN — Medication 100 MILLIGRAM(S): at 05:55

## 2018-06-19 RX ADMIN — Medication 4 UNIT(S): at 09:11

## 2018-06-19 RX ADMIN — Medication 1 UNIT(S): at 17:49

## 2018-06-19 RX ADMIN — CEFEPIME 100 MILLIGRAM(S): 1 INJECTION, POWDER, FOR SOLUTION INTRAMUSCULAR; INTRAVENOUS at 22:27

## 2018-06-19 RX ADMIN — Medication 15 GRAM(S): at 13:05

## 2018-06-19 NOTE — PROGRESS NOTE ADULT - PROBLEM SELECTOR PLAN 1
-Pt still having borderline fevers  -CT chest w/o evidence of PNA  -Continuing cefepime (initiated 6/14 to end on 6/20); no need for Vancomycin this time as no suspicion for MRSA  -CT A/P without evidence of cholangitis or cholecystitis  -Grossly positive u/a, urine cx with no growth  -Fevers likely in setting of malignancy with high tumor burden

## 2018-06-19 NOTE — PROGRESS NOTE ADULT - ASSESSMENT
68M PMH metastatic neuroendocrine carcinoma (unknown primary, diagnosed via iliac bone biopsy, w/ pulmonary mets, mediastinal/hilar lymphadenopathy, hepatic mets, diffuse bone mets, s/p chemo/RT w/ progression of disease), DM2 (recent admission for DKA), HTN presents from [facility] with generalized weakness and abdominal pain. Found to have sepeis( fever with leukocytosis and tachycardia) in the ED. Source likely urine( less likely) vs intra abdominal vs occult source vs malignancy. Patient with metastatic neuroendocrine tumor with mets to bone/liver hence with increased AST/ALK phos. Ct chest reveals small bilateral effusions with R atelectesis likely represent malignant effusions. Receiving IV vanco/cefepime  for sepsis like presentation. Urine culture now with candida.   Overall sepsis ( fever, tachycardia, leucocytosis ) ? cholestasis given worsening hepatic mets vs tumor fever.   Resolved sepsis.   S/p CT showing no biliary obstruction.   ? source of sepsis. Afebrile now.     Recommend  --Fu final blood cultures  --Continue with Cefepime, can increase  dose to 1g q8  --Day 6/7 of abx today, until 6/20/18   --Abdominal pain/discomfort likely 2/2 metastatic disease   --Ct abdomen with POD, palliative on board.

## 2018-06-19 NOTE — GOALS OF CARE CONVERSATION - PERSONAL ADVANCE DIRECTIVE - CONVERSATION DETAILS
The patient's primary HCP was able to give verbalized understanding about the patient's advanced illness (metastatic neuroendocrine tumor with significant functional decline, anorexia-cachexia, and recurrent encephalopathy/delirium) and poor prognosis. I d/w her about code status. Shanel indicated she had discussed code status with the patient before and that he indicated that if his heart were to stop that he may not have wanted to be resuscitated. I further discussed with her about the limited chances the patient has for surviving CPR and that if surviving he has a high likelihood of being dependent on life sustaining measures. Shanel indicated that based on the patient's prior expressed wishes and based on his actual situation that his code status should be DNR/I. Current GOC are towards trying to manage possible causes for altered mental status while appropriately managing distressful symptoms.     35' spent on ACP during this encounter.     Jasvir Camara MD.   [phone]

## 2018-06-19 NOTE — PROGRESS NOTE ADULT - PROBLEM SELECTOR PLAN 4
Will increase Senna to 3 tabs HS, Will continue Miralax qd. Will add dulcolax CA PRN x 1 dose today.

## 2018-06-19 NOTE — PROGRESS NOTE ADULT - SUBJECTIVE AND OBJECTIVE BOX
[place]  ID [id number]  INTERVAL HPI/OVERNIGHT EVENTS: Worsening pain and confusion overnight. At this time the patient is lethargic but arousable. He know he is in [facility]. He is c/o severe pain over his left hip and back. He is not able to indicate the characteristics of his pain. He is also c/o visual hallucinations.   Allergies  No Known Allergies  Intolerances    ADVANCE DIRECTIVES:  FULL CODE  DNR [x]NO  MOLST [ ] YES [x ] NO     MEDICATIONS  (STANDING):  ascorbic acid 500 milliGRAM(s) Oral daily  cefepime   IVPB 1000 milliGRAM(s) IV Intermittent every 8 hours  dextrose 5%. 1000 milliLiter(s) (50 mL/Hr) IV Continuous <Continuous>  dextrose 50% Injectable 12.5 Gram(s) IV Push once  dextrose 50% Injectable 25 Gram(s) IV Push once  dextrose 50% Injectable 25 Gram(s) IV Push once  docusate sodium 100 milliGRAM(s) Oral three times a day  enoxaparin Injectable 40 milliGRAM(s) SubCutaneous daily  HYDROmorphone  Injectable 0.2 milliGRAM(s) IV Push every 4 hours  insulin glargine Injectable (LANTUS) 5 Unit(s) SubCutaneous every morning  insulin lispro (HumaLOG) corrective regimen sliding scale   SubCutaneous three times a day before meals  insulin lispro (HumaLOG) corrective regimen sliding scale   SubCutaneous at bedtime  insulin lispro Injectable (HumaLOG) 1 Unit(s) SubCutaneous before dinner  insulin lispro Injectable (HumaLOG) 4 Unit(s) SubCutaneous before breakfast  insulin lispro Injectable (HumaLOG) 2 Unit(s) SubCutaneous before lunch  lactated ringers. 1000 milliLiter(s) (100 mL/Hr) IV Continuous <Continuous>  multivitamin 1 Tablet(s) Oral daily  ondansetron Injectable 8 milliGRAM(s) IV Push every 8 hours  polyethylene glycol 3350 17 Gram(s) Oral daily  senna 3 Tablet(s) Oral at bedtime    MEDICATIONS  (PRN):  acetaminophen   Tablet 650 milliGRAM(s) Oral every 6 hours PRN For Temp greater than 38 C (100.4 F)  bisacodyl Suppository 10 milliGRAM(s) Rectal daily PRN Constipation  dextrose 40% Gel 15 Gram(s) Oral once PRN Blood Glucose LESS THAN 70 milliGRAM(s)/deciliter  glucagon  Injectable 1 milliGRAM(s) IntraMuscular once PRN Glucose LESS THAN 70 milligrams/deciliter  HYDROmorphone  Injectable 0.2 milliGRAM(s) IV Push every 3 hours PRN Breakthrough pain  LORazepam   Injectable 0.2 milliGRAM(s) IV Push every 12 hours PRN Anxiety  naloxone Injectable 0.1 milliGRAM(s) IV Push every 3 minutes PRN sedation or respiratory depression due to opioids      PRESENT SYMPTOMS:  Source: [x ] Patient   [x ] Family   [ ] Team     Pain:                        [ ] No [ x] Yes             [ ] Mild [ ] Moderate [x ] Severe      Onset - on deep palpation   Location - RLQ  Duration - intermittent  Character - sharp  Alleviating/Aggravating - aggravated or apparent w/ palpation  Radiation -   Timing -    Dyspnea:                [x] No [ ] Yes             [ ] Mild [ ] Moderate [ ] Severe    Anxiety:                  [x ] No [ ] Yes             [ ] Mild [ ] Moderate [ ] Severe    Fatigue:                  [ ] No [x] Yes             [x ] Mild [ ] Moderate [ ] Severe    Nausea:                  [  ] No [x] Yes             [ x] Mild [ ] Moderate [ ] Severe    Loss of appetite:   [ ] No [x] Yes             [x ] Mild [ ] Moderate [ ] Severe    Constipation:        [x] No [ ] Yes             [ ] Mild [ ] Moderate [ ] Severe    Other Symptoms:  [x ] All other review of systems negative   [ ] Unable to obtain due to poor mentation     Karnofsky Performance Score/Palliative Performance Status Version 2:  30  %    PHYSICAL EXAM:  Vital Signs Last 24 Hrs  T(C): 37.1 (19 Jun 2018 04:50), Max: 37.1 (18 Jun 2018 21:33)  T(F): 98.8 (19 Jun 2018 04:50), Max: 98.8 (18 Jun 2018 21:33)  HR: 108 (19 Jun 2018 04:50) (108 - 114)  BP: 122/72 (19 Jun 2018 04:50) (116/69 - 125/76)  BP(mean): --  RR: 18 (19 Jun 2018 04:50) (18 - 20)  SpO2: 92% (19 Jun 2018 04:50) (92% - 95%)  General:  [x ] Alert  [ ] Oriented x  2    [ ] Lethargic  [ ] Agitated   [ ] Cachexia   [ ] Unarousable  [x ] Verbal  [ ] Non-Verbal    HEENT:  [x ] Normal   [ ] Dry mouth   [ ] ET Tube    [ ] Trach  [ ] Oral lesions    Lungs:   [x ] Clear [ ] Tachypnea  [ ] Audible excessive secretions   [ ] Rhonchi        [ ] Right [ ] Left [ ] Bilateral  [ ] Crackles        [ ] Right [ ] Left [ ] Bilateral  [ ] Wheezing     [ ] Right [ ] Left [ ] Bilateral    Cardiovascular:  [x ] Regular [ ] Irregular [ ] Tachycardia   [ ] Bradycardia  [ ] Murmur [ ] Other    Abdomen: [ x] Soft  [ ] Distended   [ ] +BS  [ ] Non tender [ ] Tender  [ ]PEG   [ ]OGT/ NGT   Last BM: No BM during this admission     Genitourinary: [ ] Normal [x ] Incontinent   [ ] Oliguria/Anuria   [ ] Moore    Musculoskeletal:  [ ] Normal   [ ] Weakness  [ x] Bedbound/Wheelchair bound [ ] Edema    Neurological: [ x] No focal deficits  [ ] Cognitive impairment  [ ] Dysphagia [ ] Dysarthria [ ] Paresis [ ] Other     Skin: [ ] Normal   [x ] Pressure ulcer(s)                  [ ] Rash    LABS:                                   8.9    9.7   )-----------( 218      ( 19 Jun 2018 07:00 )             27.0   06-19    131<L>  |  91<L>  |  11  ----------------------------<  83  3.8   |  29  |  0.40<L>    Ca    8.0<L>      19 Jun 2018 07:00  Phos  3.1     06-19  Mg     1.8     06-19    TPro  6.5  /  Alb  2.0<L>  /  TBili  1.2  /  DBili  x   /  AST  104<H>  /  ALT  22  /  AlkPhos  846<H>  06-18    Shock: [ ] Septic [ ] Cardiogenic [ ] Neurologic [ ] Hypovolemic  Vasopressors x   Inotrophs x     Oral Intake: [ ] Unable/mouth care only [x ] Minimal [ ] Moderate [ ] Full Capability    Diet: [ ] NPO [ ] Tube feeds [ ] TPN [ ] Other     RADIOLOGY & ADDITIONAL STUDIES:  < from: CT Abdomen and Pelvis w/ IV Cont (06.18.18 @ 17:18) >    EXAM:  CT ABDOMEN AND PELVIS IC                            PROCEDURE DATE:  06/18/2018            INTERPRETATION:  CLINICAL INFORMATION: Metastatic neuroendocrine tumor.   Elevated alkaline phosphatase and sepsis. Concern for cholangitis.     COMPARISON:   1.  CT Chest on 6/16/2018  2.  CT Chest/abdomen/pelvis on 5/30/2018  3.  PET/CT on 4/16/2018  4.  Abdominal MRI on 11/24/2017IMPRESSION:     No biliary ductal dilatation or evidence of cholangitis.    Bilobar hepatic metastatic disease with suggestion of progression   compared with prior study 5/20/2018.    Pulmonary and osseous metastatic disease without significant change.    < end of copied text >    REFERRALS:   [ ] Chaplaincy  [ ] Hospice  [ ] Child Life  [ ] Social Work  [ ] Case management [ ] Holistic Therapy   Jasvir Camara MD [phone] [place]  ID [id number]  INTERVAL HPI/OVERNIGHT EVENTS: Worsening pain and confusion overnight. At this time the patient is lethargic but arousable. He knew he was in [facility]. He is c/o severe pain over his left hip and back. He is not able to indicate the characteristics of his pain. He is also c/o visual hallucinations.   Allergies  No Known Allergies  Intolerances    ADVANCE DIRECTIVES:  FULL CODE  DNR [x]NO  MOLST [ ] YES [x ] NO     MEDICATIONS  (STANDING):  ascorbic acid 500 milliGRAM(s) Oral daily  cefepime   IVPB 1000 milliGRAM(s) IV Intermittent every 8 hours  dextrose 5%. 1000 milliLiter(s) (50 mL/Hr) IV Continuous <Continuous>  dextrose 50% Injectable 12.5 Gram(s) IV Push once  dextrose 50% Injectable 25 Gram(s) IV Push once  dextrose 50% Injectable 25 Gram(s) IV Push once  docusate sodium 100 milliGRAM(s) Oral three times a day  enoxaparin Injectable 40 milliGRAM(s) SubCutaneous daily  HYDROmorphone  Injectable 0.2 milliGRAM(s) IV Push every 4 hours  insulin glargine Injectable (LANTUS) 5 Unit(s) SubCutaneous every morning  insulin lispro (HumaLOG) corrective regimen sliding scale   SubCutaneous three times a day before meals  insulin lispro (HumaLOG) corrective regimen sliding scale   SubCutaneous at bedtime  insulin lispro Injectable (HumaLOG) 1 Unit(s) SubCutaneous before dinner  insulin lispro Injectable (HumaLOG) 4 Unit(s) SubCutaneous before breakfast  insulin lispro Injectable (HumaLOG) 2 Unit(s) SubCutaneous before lunch  lactated ringers. 1000 milliLiter(s) (100 mL/Hr) IV Continuous <Continuous>  multivitamin 1 Tablet(s) Oral daily  ondansetron Injectable 8 milliGRAM(s) IV Push every 8 hours  polyethylene glycol 3350 17 Gram(s) Oral daily  senna 3 Tablet(s) Oral at bedtime    MEDICATIONS  (PRN):  acetaminophen   Tablet 650 milliGRAM(s) Oral every 6 hours PRN For Temp greater than 38 C (100.4 F)  bisacodyl Suppository 10 milliGRAM(s) Rectal daily PRN Constipation  dextrose 40% Gel 15 Gram(s) Oral once PRN Blood Glucose LESS THAN 70 milliGRAM(s)/deciliter  glucagon  Injectable 1 milliGRAM(s) IntraMuscular once PRN Glucose LESS THAN 70 milligrams/deciliter  HYDROmorphone  Injectable 0.2 milliGRAM(s) IV Push every 3 hours PRN Breakthrough pain  LORazepam   Injectable 0.2 milliGRAM(s) IV Push every 12 hours PRN Anxiety  naloxone Injectable 0.1 milliGRAM(s) IV Push every 3 minutes PRN sedation or respiratory depression due to opioids      PRESENT SYMPTOMS:  Source: [x ] Patient   [x ] Family   [ ] Team     Pain:                        [ ] No [ x] Yes             [ ] Mild [ ] Moderate [x ] Severe PAIN AD 7    Before his pain has been described as:   Onset - on deep palpation   Location - RLQ  Duration - intermittent  Character - sharp  Alleviating/Aggravating - aggravated or apparent w/ palpation  Radiation -   Timing -    Dyspnea:                [x] No [ ] Yes             [ ] Mild [ ] Moderate [ ] Severe    Anxiety:                  [x ] No [ ] Yes             [ ] Mild [ ] Moderate [ ] Severe    Fatigue:                  [ ] No [x] Yes             [x ] Mild [ ] Moderate [ ] Severe    Nausea:                  [  ] No [x] Yes             [ x] Mild [ ] Moderate [ ] Severe    Loss of appetite:   [ ] No [x] Yes             [x ] Mild [ ] Moderate [ ] Severe    Constipation:        [x] No [ ] Yes             [ ] Mild [ ] Moderate [ ] Severe    Other Symptoms:  [x ] All other review of systems negative   [ ] Unable to obtain due to poor mentation     Karnofsky Performance Score/Palliative Performance Status Version 2:  30  %    PHYSICAL EXAM:  Vital Signs Last 24 Hrs  T(C): 37.1 (19 Jun 2018 04:50), Max: 37.1 (18 Jun 2018 21:33)  T(F): 98.8 (19 Jun 2018 04:50), Max: 98.8 (18 Jun 2018 21:33)  HR: 108 (19 Jun 2018 04:50) (108 - 114)  BP: 122/72 (19 Jun 2018 04:50) (116/69 - 125/76)  BP(mean): --  RR: 18 (19 Jun 2018 04:50) (18 - 20)  SpO2: 92% (19 Jun 2018 04:50) (92% - 95%)  General:  [x ] Alert  [x ] Oriented x  2    [ ] Lethargic  [ ] Agitated   [ ] Cachexia   [ ] Unarousable  [x ] Verbal  [ ] Non-Verbal [x] Ill appearing male in distress due to pain.     HEENT:  [x ] Normal   [ ] Dry mouth   [ ] ET Tube    [ ] Trach  [ ] Oral lesions    Lungs:   [x ] Clear [ ] Tachypnea  [ ] Audible excessive secretions   [ ] Rhonchi        [ ] Right [ ] Left [ ] Bilateral  [ ] Crackles        [ ] Right [ ] Left [ ] Bilateral  [ ] Wheezing     [ ] Right [ ] Left [ ] Bilateral    Cardiovascular:  [x ] Regular [ ] Irregular [ ] Tachycardia   [ ] Bradycardia  [ ] Murmur [ ] Other    Abdomen: [ x] Soft  [ ] Distended   [ ] +BS  [ ] Non tender [ ] Tender  [ ]PEG   [ ]OGT/ NGT   Last BM: No BM during this admission     Genitourinary: [ ] Normal [x ] Incontinent   [ ] Oliguria/Anuria   [ ] Moore    Musculoskeletal:  [ ] Normal   [ ] Weakness  [ x] Bedbound/Wheelchair bound [ ] Edema    Neurological: [ ] No focal deficits  [ ] Cognitive impairment  [ ] Dysphagia [ ] Dysarthria [ ] Paresis [x ] Other: Lethargic     Skin: [ ] Normal   [x ] Pressure ulcer(s)                  [ ] Rash    LABS:                                   8.9    9.7   )-----------( 218      ( 19 Jun 2018 07:00 )             27.0   06-19    131<L>  |  91<L>  |  11  ----------------------------<  83  3.8   |  29  |  0.40<L>    Ca    8.0<L>      19 Jun 2018 07:00  Phos  3.1     06-19  Mg     1.8     06-19    TPro  6.5  /  Alb  2.0<L>  /  TBili  1.2  /  DBili  x   /  AST  104<H>  /  ALT  22  /  AlkPhos  846<H>  06-18    Shock: [ ] Septic [ ] Cardiogenic [ ] Neurologic [ ] Hypovolemic  Vasopressors x   Inotrophs x     Oral Intake: [ ] Unable/mouth care only [x ] Minimal [ ] Moderate [ ] Full Capability    Diet: [ ] NPO [ ] Tube feeds [ ] TPN [ ] Other     RADIOLOGY & ADDITIONAL STUDIES:  < from: CT Abdomen and Pelvis w/ IV Cont (06.18.18 @ 17:18) >    EXAM:  CT ABDOMEN AND PELVIS IC                            PROCEDURE DATE:  06/18/2018            INTERPRETATION:  CLINICAL INFORMATION: Metastatic neuroendocrine tumor.   Elevated alkaline phosphatase and sepsis. Concern for cholangitis.     COMPARISON:   1.  CT Chest on 6/16/2018  2.  CT Chest/abdomen/pelvis on 5/30/2018  3.  PET/CT on 4/16/2018  4.  Abdominal MRI on 11/24/2017IMPRESSION:     No biliary ductal dilatation or evidence of cholangitis.    Bilobar hepatic metastatic disease with suggestion of progression   compared with prior study 5/20/2018.    Pulmonary and osseous metastatic disease without significant change.    < end of copied text >    REFERRALS:   [ ] Chaplaincy  [ ] Hospice  [ ] Child Life  [ ] Social Work  [ ] Case management [ ] Holistic Therapy   Jasvir Camara MD [phone]

## 2018-06-19 NOTE — PROGRESS NOTE ADULT - SUBJECTIVE AND OBJECTIVE BOX
Patient is a 68y old  Male who presents with a chief complaint of generalized weakness (14 Jun 2018 19:35)      Overnight Events:  No acute events ON.     REVIEW OF SYSTEMS:  No acute events ON. Pt endorses abdominal pain. He is more confused this am.     VITAL SIGNS:  Vital Signs Last 24 Hrs  T(C): 36.8 (19 Jun 2018 14:19), Max: 37.1 (18 Jun 2018 21:33)  T(F): 98.2 (19 Jun 2018 14:19), Max: 98.8 (18 Jun 2018 21:33)  HR: 117 (19 Jun 2018 14:19) (108 - 117)  BP: 127/78 (19 Jun 2018 14:19) (122/72 - 127/78)  BP(mean): --  RR: 18 (19 Jun 2018 14:19) (18 - 20)  SpO2: 95% (19 Jun 2018 14:19) (92% - 95%)    PHYSICAL EXAM:   GENERAL: no acute distress  HEENT: NC/AT, EOMI, neck supple, MMM  RESPIRATORY: LCTAB/L, no rhonchi, rales, or wheezing  CARDIOVASCULAR: RRR, no murmurs, gallops, rubs  ABDOMINAL: firm, tenderness to palpation in R/LUQ  EXTREMITIES: no clubbing, cyanosis, or edema  NEUROLOGICAL:  non-focal  SKIN: no rashes or lesions   MUSCULOSKELETAL: no gross joint deformity                          8.9    9.7   )-----------( 218      ( 19 Jun 2018 07:00 )             27.0     06-19    131<L>  |  91<L>  |  11  ----------------------------<  83  3.8   |  29  |  0.40<L>    Ca    8.0<L>      19 Jun 2018 07:00  Phos  3.1     06-19  Mg     1.8     06-19    TPro  6.5  /  Alb  2.0<L>  /  TBili  1.2  /  DBili  x   /  AST  104<H>  /  ALT  22  /  AlkPhos  846<H>  06-18        CAPILLARY BLOOD GLUCOSE      POCT Blood Glucose.: 106 mg/dL (19 Jun 2018 13:29)  POCT Blood Glucose.: 78 mg/dL (19 Jun 2018 13:14)  POCT Blood Glucose.: 60 mg/dL (19 Jun 2018 12:56)  POCT Blood Glucose.: 68 mg/dL (19 Jun 2018 12:54)  POCT Blood Glucose.: 125 mg/dL (19 Jun 2018 08:48)  POCT Blood Glucose.: 90 mg/dL (18 Jun 2018 21:33)  POCT Blood Glucose.: 132 mg/dL (18 Jun 2018 17:33)    I&O's Summary    18 Jun 2018 07:01  -  19 Jun 2018 07:00  --------------------------------------------------------  IN: 360 mL / OUT: 0 mL / NET: 360 mL        MEDICATIONS  (STANDING):  ascorbic acid 500 milliGRAM(s) Oral daily  cefepime   IVPB 1000 milliGRAM(s) IV Intermittent every 8 hours  dextrose 5%. 1000 milliLiter(s) (50 mL/Hr) IV Continuous <Continuous>  dextrose 50% Injectable 12.5 Gram(s) IV Push once  dextrose 50% Injectable 25 Gram(s) IV Push once  dextrose 50% Injectable 25 Gram(s) IV Push once  docusate sodium 100 milliGRAM(s) Oral three times a day  dronabinol 2.5 milliGRAM(s) Oral daily  enoxaparin Injectable 40 milliGRAM(s) SubCutaneous daily  HYDROmorphone  Injectable 0.2 milliGRAM(s) IV Push every 6 hours  insulin glargine Injectable (LANTUS) 5 Unit(s) SubCutaneous every morning  insulin lispro (HumaLOG) corrective regimen sliding scale   SubCutaneous three times a day before meals  insulin lispro (HumaLOG) corrective regimen sliding scale   SubCutaneous at bedtime  insulin lispro Injectable (HumaLOG) 2 Unit(s) SubCutaneous before lunch  insulin lispro Injectable (HumaLOG) 2 Unit(s) SubCutaneous before breakfast  insulin lispro Injectable (HumaLOG) 1 Unit(s) SubCutaneous before dinner  lactated ringers. 1000 milliLiter(s) (100 mL/Hr) IV Continuous <Continuous>  multivitamin 1 Tablet(s) Oral daily  ondansetron Injectable 8 milliGRAM(s) IV Push every 8 hours  polyethylene glycol 3350 17 Gram(s) Oral daily  senna 3 Tablet(s) Oral at bedtime

## 2018-06-19 NOTE — PROGRESS NOTE ADULT - PROBLEM SELECTOR PLAN 2
Multifactorial (infection, metabolic, medications, constipation)   Work up and Rx as per primary. However, will advise checking ammonia levels   Will rotate opioids back to Dilaudid  Will change xanax to ativan 0.25 q 12 PRN   Continue Abx as per primary   Will adjust bowel regiment

## 2018-06-19 NOTE — PROGRESS NOTE ADULT - PROBLEM SELECTOR PLAN 10
-Palliative consult as above  -Pt now DNR after discussion with palliative; GOC discussions with wife

## 2018-06-19 NOTE — PROGRESS NOTE ADULT - SUBJECTIVE AND OBJECTIVE BOX
68y old  Male who presents with a chief complaint of generalized weakness (14 Jun 2018 19:35)      Interval history:  Afebrile, confused, + abdominal pain, occasional cough.     No Known Allergies    Antimicrobials:  cefepime   IVPB 1000 milliGRAM(s) IV Intermittent every 8 hours    REVIEW OF SYSTEMS:  No chest pain or palpitations  No N/V/D,  No rash.     Vital Signs Last 24 Hrs  T(C): 36.8 (06-19-18 @ 14:19), Max: 37.1 (06-18-18 @ 21:33)  T(F): 98.2 (06-19-18 @ 14:19), Max: 98.8 (06-18-18 @ 21:33)  HR: 117 (06-19-18 @ 14:19) (108 - 117)  BP: 127/78 (06-19-18 @ 14:19) (122/72 - 127/78)  RR: 18 (06-19-18 @ 14:19) (18 - 20)  SpO2: 95% (06-19-18 @ 14:19) (92% - 95%)    PHYSICAL EXAM:  Patient in no acute distress. Awake, communicative, confused.   Cardiovascular: S1S2 normal, tachycardic.   Lungs: +air entry B/L lung fields.  Gastrointestinal: softly distended, generalized tenderness   Extremities: no edema.  IV sites not inflamed.                           8.9    9.7   )-----------( 218      ( 19 Jun 2018 07:00 )             27.0   06-19    131<L>  |  91<L>  |  11  ----------------------------<  83  3.8   |  29  |  0.40<L>    Ca    8.0<L>      19 Jun 2018 07:00  Phos  3.1     06-19  Mg     1.8     06-19    TPro  6.5  /  Alb  2.0<L>  /  TBili  1.2  /  DBili  x   /  AST  104<H>  /  ALT  22  /  AlkPhos  846<H>  06-18      LIVER FUNCTIONS - ( 18 Jun 2018 07:25 )  Alb: 2.0 g/dL / Pro: 6.5 g/dL / ALK PHOS: 846 U/L / ALT: 22 U/L / AST: 104 U/L / GGT: x               Radiology:  < from: CT Abdomen and Pelvis w/ IV Cont (06.18.18 @ 17:18) >  IMPRESSION:     No biliary ductal dilatation or evidence of cholangitis.    Bilobar hepatic metastatic disease with suggestion of progression   compared with prior study 5/20/2018.    Pulmonary and osseous metastatic disease without significant change.

## 2018-06-19 NOTE — PROGRESS NOTE ADULT - PROBLEM SELECTOR PLAN 3
-CT A/P without evidence of cholangitis or cholecystitis, but is demonstrative of worsening hepatic mets

## 2018-06-19 NOTE — PROGRESS NOTE ADULT - ASSESSMENT
Patient is a 69 y/o M with significant PMH of metastatic neuroendocrine carcinoma (unknown primary, diagnosed via iliac bone biopsy, w/ pulmonary mets, mediastinal/hilar lymphadenopathy, hepatic mets, diffuse bone mets, s/p chemo/RT w/ progression of disease), DM2 (recent admission for DKA), HTN presented from [facility] with generalized weakness with sepsis 2/2 PNA vs. UTI.  Palliative care following for pain management and ongoing GOC.

## 2018-06-19 NOTE — PROGRESS NOTE ADULT - PROBLEM SELECTOR PLAN 5
I started to re-address code status and I will f/u on it tomorrow.   Daughter indicates to be the HCP; however, that paperwork is t [facility]. Will work with SW on the floor to try to get HCP forms. Daughter also said she may have them at home and that she will bring the form if available.

## 2018-06-19 NOTE — PROGRESS NOTE ADULT - ASSESSMENT
68M PMH metastatic neuroendocrine carcinoma (unknown primary, diagnosed via iliac bone biopsy, w/ pulmonary mets, mediastinal/hilar lymphadenopathy, hepatic mets, diffuse bone mets, s/p chemo/RT w/ progression of disease), DM2 (recent admission for DKA), HTN presents from [facility] with generalized weakness found to have sepsis in likely setting of UTI; CT negative for PNA.

## 2018-06-19 NOTE — PROGRESS NOTE ADULT - PROBLEM SELECTOR PLAN 1
- c/w Humalog 4 units before breakfast, 2 units before lunch, and 1 unit of Humalog before dinner. Spoke with patient's nurse - recommend administer Humalog after patient has started eating  - will decrease Lantus to 5 units qAM - serum glucose this morning 83  - c/w low correction scale  - consistent carb diet   - will follow  - for discharge: likely d/c on Lantus and Prandin - will adjust Humalog as follows: 2/2/1.  Spoke with patient's nurse - recommend administer Humalog after patient has started eating. Given cancer diagnosis, strict glycemic control not required. Goal BG is 100-200.  - will decrease Lantus to 5 units qAM - serum glucose this morning 83  - c/w low correction scale  - consistent carb diet   - will follow  - for discharge: likely d/c on Lantus and Prandin

## 2018-06-19 NOTE — PROGRESS NOTE ADULT - PROBLEM SELECTOR PLAN 1
Due to AMS and liver impairment, will DC Morphine and re-start Dilaudid 0.2 mg IV q 4 ATC and 0.2 mg IV q 2 PRN

## 2018-06-19 NOTE — PROGRESS NOTE ADULT - SUBJECTIVE AND OBJECTIVE BOX
Chief Complaint: f/u DM2    History:  Patient seen and examined at bedside. States that he typically eats more at breakfast, less later in the day. He received 1 unit of Humalog for dinner last night but did not eat dinner. Family at bedside states that patient typically eats more in the morning.    Patient denies abdominal pain, nausea, vomiting.    MEDICATIONS  (STANDING):  ascorbic acid 500 milliGRAM(s) Oral daily  cefepime   IVPB 1000 milliGRAM(s) IV Intermittent every 8 hours  dextrose 5%. 1000 milliLiter(s) (50 mL/Hr) IV Continuous <Continuous>  dextrose 50% Injectable 12.5 Gram(s) IV Push once  dextrose 50% Injectable 25 Gram(s) IV Push once  dextrose 50% Injectable 25 Gram(s) IV Push once  docusate sodium 100 milliGRAM(s) Oral three times a day  enoxaparin Injectable 40 milliGRAM(s) SubCutaneous daily  HYDROmorphone  Injectable 0.2 milliGRAM(s) IV Push every 4 hours  insulin glargine Injectable (LANTUS) 6 Unit(s) SubCutaneous every morning  insulin lispro (HumaLOG) corrective regimen sliding scale   SubCutaneous three times a day before meals  insulin lispro (HumaLOG) corrective regimen sliding scale   SubCutaneous at bedtime  insulin lispro Injectable (HumaLOG) 2 Unit(s) SubCutaneous before lunch  insulin lispro Injectable (HumaLOG) 4 Unit(s) SubCutaneous before breakfast  lactated ringers. 1000 milliLiter(s) (100 mL/Hr) IV Continuous <Continuous>  multivitamin 1 Tablet(s) Oral daily  ondansetron Injectable 8 milliGRAM(s) IV Push every 8 hours  polyethylene glycol 3350 17 Gram(s) Oral daily  senna 2 Tablet(s) Oral at bedtime    MEDICATIONS  (PRN):  acetaminophen   Tablet 650 milliGRAM(s) Oral every 6 hours PRN For Temp greater than 38 C (100.4 F)  ALPRAZolam 0.5 milliGRAM(s) Oral every 12 hours PRN anxiety  dextrose 40% Gel 15 Gram(s) Oral once PRN Blood Glucose LESS THAN 70 milliGRAM(s)/deciliter  glucagon  Injectable 1 milliGRAM(s) IntraMuscular once PRN Glucose LESS THAN 70 milligrams/deciliter  HYDROmorphone  Injectable 0.2 milliGRAM(s) IV Push every 3 hours PRN Breakthrough pain  naloxone Injectable 0.1 milliGRAM(s) IV Push every 3 minutes PRN sedation or respiratory depression due to opioids      Allergies  No Known Allergies    Review of Systems:  Constitutional: No fever  Cardiovascular: No chest pain, palpitations  Respiratory: No SOB, no cough  GI: No nausea, vomiting, abdominal pain    ALL OTHER SYSTEMS REVIEWED AND NEGATIVE    PHYSICAL EXAM:  VITALS: T(C): 37.1 (06-19-18 @ 04:50)  T(F): 98.8 (06-19-18 @ 04:50), Max: 98.8 (06-18-18 @ 21:33)  HR: 108 (06-19-18 @ 04:50) (108 - 114)  BP: 122/72 (06-19-18 @ 04:50) (116/69 - 125/76)  RR:  (18 - 20)  SpO2:  (92% - 95%)  Wt(kg): --  GENERAL: NAD, well-developed  RESPIRATORY: Clear to auscultation bilaterally; No rales, rhonchi, wheezing, or rubs  CARDIOVASCULAR: Regular rate and rhythm; No murmurs; no peripheral edema  GI: Soft, nontender, less distended today  SKIN: Dry, intact    POCT Blood Glucose.: 125 mg/dL (06-19-18 @ 08:48) H 4  POCT Blood Glucose.: 90 mg/dL (06-18-18 @ 21:33)  POCT Blood Glucose.: 132 mg/dL (06-18-18 @ 17:33) H 1  POCT Blood Glucose.: 281 mg/dL (06-18-18 @ 12:06) H 2, 3  POCT Blood Glucose.: 133 mg/dL (06-18-18 @ 08:37) L 6, H 3  POCT Blood Glucose.: 117 mg/dL (06-17-18 @ 21:42)  POCT Blood Glucose.: 116 mg/dL (06-17-18 @ 17:35)  POCT Blood Glucose.: 220 mg/dL (06-17-18 @ 13:26)  POCT Blood Glucose.: 169 mg/dL (06-17-18 @ 08:42)  POCT Blood Glucose.: 162 mg/dL (06-16-18 @ 21:45)  POCT Blood Glucose.: 192 mg/dL (06-16-18 @ 17:35)  POCT Blood Glucose.: 200 mg/dL (06-16-18 @ 13:08)      06-19    131<L>  |  91<L>  |  11  ----------------------------<  83  3.8   |  29  |  0.40<L>    EGFR if : 143  EGFR if non : 124    Ca    8.0<L>      06-19  Mg     1.8     06-19  Phos  3.1     06-19    TPro  6.5  /  Alb  2.0<L>  /  TBili  1.2  /  DBili  x   /  AST  104<H>  /  ALT  22  /  AlkPhos  846<H>  06-18          Thyroid Function Tests:  06-09 @ 08:24 TSH 1.90 FreeT4 -- T3 -- Anti TPO -- Anti Thyroglobulin Ab -- TSI --      Hemoglobin A1C, Whole Blood: 8.2 % <H> [4.0 - 5.6] (06-07-18 @ 03:08) Chief Complaint: f/u DM2    History:  Patient seen and examined at bedside. States that he typically eats more at breakfast, less later in the day. He received 1 unit of Humalog for dinner last night but did not eat dinner. Family at bedside states that patient typically eats more in the morning. He developed hypoglycemia at lunch today - family states that patient did not have cereal this morning, as he usually does, because he fell asleep before eating his meal.    Patient denies abdominal pain, nausea, vomiting.    MEDICATIONS  (STANDING):  ascorbic acid 500 milliGRAM(s) Oral daily  cefepime   IVPB 1000 milliGRAM(s) IV Intermittent every 8 hours  dextrose 5%. 1000 milliLiter(s) (50 mL/Hr) IV Continuous <Continuous>  dextrose 50% Injectable 12.5 Gram(s) IV Push once  dextrose 50% Injectable 25 Gram(s) IV Push once  dextrose 50% Injectable 25 Gram(s) IV Push once  docusate sodium 100 milliGRAM(s) Oral three times a day  enoxaparin Injectable 40 milliGRAM(s) SubCutaneous daily  HYDROmorphone  Injectable 0.2 milliGRAM(s) IV Push every 4 hours  insulin glargine Injectable (LANTUS) 6 Unit(s) SubCutaneous every morning  insulin lispro (HumaLOG) corrective regimen sliding scale   SubCutaneous three times a day before meals  insulin lispro (HumaLOG) corrective regimen sliding scale   SubCutaneous at bedtime  insulin lispro Injectable (HumaLOG) 2 Unit(s) SubCutaneous before lunch  insulin lispro Injectable (HumaLOG) 4 Unit(s) SubCutaneous before breakfast  lactated ringers. 1000 milliLiter(s) (100 mL/Hr) IV Continuous <Continuous>  multivitamin 1 Tablet(s) Oral daily  ondansetron Injectable 8 milliGRAM(s) IV Push every 8 hours  polyethylene glycol 3350 17 Gram(s) Oral daily  senna 2 Tablet(s) Oral at bedtime    MEDICATIONS  (PRN):  acetaminophen   Tablet 650 milliGRAM(s) Oral every 6 hours PRN For Temp greater than 38 C (100.4 F)  ALPRAZolam 0.5 milliGRAM(s) Oral every 12 hours PRN anxiety  dextrose 40% Gel 15 Gram(s) Oral once PRN Blood Glucose LESS THAN 70 milliGRAM(s)/deciliter  glucagon  Injectable 1 milliGRAM(s) IntraMuscular once PRN Glucose LESS THAN 70 milligrams/deciliter  HYDROmorphone  Injectable 0.2 milliGRAM(s) IV Push every 3 hours PRN Breakthrough pain  naloxone Injectable 0.1 milliGRAM(s) IV Push every 3 minutes PRN sedation or respiratory depression due to opioids      Allergies  No Known Allergies    Review of Systems:  Constitutional: No fever  Cardiovascular: No chest pain, palpitations  Respiratory: No SOB, no cough  GI: No nausea, vomiting, abdominal pain    ALL OTHER SYSTEMS REVIEWED AND NEGATIVE    PHYSICAL EXAM:  VITALS: T(C): 37.1 (06-19-18 @ 04:50)  T(F): 98.8 (06-19-18 @ 04:50), Max: 98.8 (06-18-18 @ 21:33)  HR: 108 (06-19-18 @ 04:50) (108 - 114)  BP: 122/72 (06-19-18 @ 04:50) (116/69 - 125/76)  RR:  (18 - 20)  SpO2:  (92% - 95%)  Wt(kg): --  GENERAL: NAD, well-developed  RESPIRATORY: Clear to auscultation bilaterally; No rales, rhonchi, wheezing, or rubs  CARDIOVASCULAR: Regular rate and rhythm; No murmurs; no peripheral edema  GI: Soft, nontender, less distended today  SKIN: Dry, intact    POCT Blood Glucose.: 125 mg/dL (06-19-18 @ 08:48) H 4  POCT Blood Glucose.: 90 mg/dL (06-18-18 @ 21:33)  POCT Blood Glucose.: 132 mg/dL (06-18-18 @ 17:33) H 1  POCT Blood Glucose.: 281 mg/dL (06-18-18 @ 12:06) H 2, 3  POCT Blood Glucose.: 133 mg/dL (06-18-18 @ 08:37) L 6, H 3  POCT Blood Glucose.: 117 mg/dL (06-17-18 @ 21:42)  POCT Blood Glucose.: 116 mg/dL (06-17-18 @ 17:35)  POCT Blood Glucose.: 220 mg/dL (06-17-18 @ 13:26)  POCT Blood Glucose.: 169 mg/dL (06-17-18 @ 08:42)  POCT Blood Glucose.: 162 mg/dL (06-16-18 @ 21:45)  POCT Blood Glucose.: 192 mg/dL (06-16-18 @ 17:35)  POCT Blood Glucose.: 200 mg/dL (06-16-18 @ 13:08)      06-19    131<L>  |  91<L>  |  11  ----------------------------<  83  3.8   |  29  |  0.40<L>    EGFR if : 143  EGFR if non : 124    Ca    8.0<L>      06-19  Mg     1.8     06-19  Phos  3.1     06-19    TPro  6.5  /  Alb  2.0<L>  /  TBili  1.2  /  DBili  x   /  AST  104<H>  /  ALT  22  /  AlkPhos  846<H>  06-18          Thyroid Function Tests:  06-09 @ 08:24 TSH 1.90 FreeT4 -- T3 -- Anti TPO -- Anti Thyroglobulin Ab -- TSI --      Hemoglobin A1C, Whole Blood: 8.2 % <H> [4.0 - 5.6] (06-07-18 @ 03:08) Chief Complaint: f/u DM2    History:  Patient seen and examined at bedside. States that he typically eats more at breakfast, less later in the day. He received 1 unit of Humalog for dinner last night but did not eat dinner. Family at bedside states that patient typically eats more in the morning. He developed hypoglycemia at lunch today - family states that patient did not have cereal this morning, as he usually does, because he fell asleep before eating his meal.    Patient denies abdominal pain, nausea, vomiting.    MEDICATIONS  (STANDING):  ascorbic acid 500 milliGRAM(s) Oral daily  cefepime   IVPB 1000 milliGRAM(s) IV Intermittent every 8 hours  dextrose 5%. 1000 milliLiter(s) (50 mL/Hr) IV Continuous <Continuous>  dextrose 50% Injectable 12.5 Gram(s) IV Push once  dextrose 50% Injectable 25 Gram(s) IV Push once  dextrose 50% Injectable 25 Gram(s) IV Push once  docusate sodium 100 milliGRAM(s) Oral three times a day  enoxaparin Injectable 40 milliGRAM(s) SubCutaneous daily  HYDROmorphone  Injectable 0.2 milliGRAM(s) IV Push every 4 hours  insulin glargine Injectable (LANTUS) 6 Unit(s) SubCutaneous every morning  insulin lispro (HumaLOG) corrective regimen sliding scale   SubCutaneous three times a day before meals  insulin lispro (HumaLOG) corrective regimen sliding scale   SubCutaneous at bedtime  insulin lispro Injectable (HumaLOG) 2 Unit(s) SubCutaneous before lunch  insulin lispro Injectable (HumaLOG) 4 Unit(s) SubCutaneous before breakfast  lactated ringers. 1000 milliLiter(s) (100 mL/Hr) IV Continuous <Continuous>  multivitamin 1 Tablet(s) Oral daily  ondansetron Injectable 8 milliGRAM(s) IV Push every 8 hours  polyethylene glycol 3350 17 Gram(s) Oral daily  senna 2 Tablet(s) Oral at bedtime    MEDICATIONS  (PRN):  acetaminophen   Tablet 650 milliGRAM(s) Oral every 6 hours PRN For Temp greater than 38 C (100.4 F)  ALPRAZolam 0.5 milliGRAM(s) Oral every 12 hours PRN anxiety  dextrose 40% Gel 15 Gram(s) Oral once PRN Blood Glucose LESS THAN 70 milliGRAM(s)/deciliter  glucagon  Injectable 1 milliGRAM(s) IntraMuscular once PRN Glucose LESS THAN 70 milligrams/deciliter  HYDROmorphone  Injectable 0.2 milliGRAM(s) IV Push every 3 hours PRN Breakthrough pain  naloxone Injectable 0.1 milliGRAM(s) IV Push every 3 minutes PRN sedation or respiratory depression due to opioids      Allergies  No Known Allergies    Review of Systems:  Constitutional: No fever  Cardiovascular: No chest pain, palpitations  Respiratory: No SOB, no cough  GI: No nausea, vomiting, abdominal pain    ALL OTHER SYSTEMS REVIEWED AND NEGATIVE    PHYSICAL EXAM:  VITALS: T(C): 37.1 (06-19-18 @ 04:50)  T(F): 98.8 (06-19-18 @ 04:50), Max: 98.8 (06-18-18 @ 21:33)  HR: 108 (06-19-18 @ 04:50) (108 - 114)  BP: 122/72 (06-19-18 @ 04:50) (116/69 - 125/76)  RR:  (18 - 20)  SpO2:  (92% - 95%)  Wt(kg): --  GENERAL: NAD, well-developed  RESPIRATORY: Clear to auscultation bilaterally; No rales, rhonchi, wheezing, or rubs  CARDIOVASCULAR: Regular rate and rhythm; No murmurs; no peripheral edema  GI: Soft, nontender, less distended today  SKIN: Dry, intact    POCT Blood Glucose.: 125 mg/dL (06-19-18 @ 08:48) H 4  POCT Blood Glucose.: 90 mg/dL (06-18-18 @ 21:33)  POCT Blood Glucose.: 132 mg/dL (06-18-18 @ 17:33) H 1  POCT Blood Glucose.: 281 mg/dL (06-18-18 @ 12:06) H 2, 3  POCT Blood Glucose.: 133 mg/dL (06-18-18 @ 08:37) L 6, H 3  POCT Blood Glucose.: 117 mg/dL (06-17-18 @ 21:42)  POCT Blood Glucose.: 116 mg/dL (06-17-18 @ 17:35)  POCT Blood Glucose.: 220 mg/dL (06-17-18 @ 13:26)  POCT Blood Glucose.: 169 mg/dL (06-17-18 @ 08:42)  POCT Blood Glucose.: 162 mg/dL (06-16-18 @ 21:45)  POCT Blood Glucose.: 192 mg/dL (06-16-18 @ 17:35)  POCT Blood Glucose.: 200 mg/dL (06-16-18 @ 13:08)      06-19    131<L>  |  91<L>  |  11  ----------------------------<  83  3.8   |  29  |  0.40<L>    EGFR if : 143  EGFR if non : 124    Ca    8.0<L>      06-19  Mg     1.8     06-19  Phos  3.1     06-19    TPro  6.5  /  Alb  2.0<L>  /  TBili  1.2  /  DBili  x   /  AST  104<H>  /  ALT  22  /  AlkPhos  846<H>  06-18          Thyroid Function Tests:  06-09 @ 08:24 TSH 1.90   Hemoglobin A1C, Whole Blood: 8.2 % <H> [4.0 - 5.6] (06-07-18 @ 03:08)

## 2018-06-19 NOTE — PROGRESS NOTE ADULT - PROBLEM SELECTOR PLAN 3
ECOG score 4, KPS 30%  As d/w Dr Dick, will await till Thursday 6/21 to reassess GOC and Rx options.

## 2018-06-19 NOTE — PROGRESS NOTE ADULT - PROBLEM SELECTOR PLAN 2
- + UA, will f/u urine culture- urine cx on 6/14 with growth of candida   - c/w cefepime for now to cover 7 days  - unclear whether pt truly has UTI given cx negative for bacteria; fevers likely in setting of malignancy

## 2018-06-20 LAB
ANION GAP SERPL CALC-SCNC: 10 MMOL/L — SIGNIFICANT CHANGE UP (ref 5–17)
BUN SERPL-MCNC: 11 MG/DL — SIGNIFICANT CHANGE UP (ref 7–23)
CALCIUM SERPL-MCNC: 7.7 MG/DL — LOW (ref 8.4–10.5)
CHLORIDE SERPL-SCNC: 93 MMOL/L — LOW (ref 96–108)
CO2 SERPL-SCNC: 29 MMOL/L — SIGNIFICANT CHANGE UP (ref 22–31)
CREAT SERPL-MCNC: 0.44 MG/DL — LOW (ref 0.5–1.3)
GLUCOSE BLDC GLUCOMTR-MCNC: 100 MG/DL — HIGH (ref 70–99)
GLUCOSE BLDC GLUCOMTR-MCNC: 155 MG/DL — HIGH (ref 70–99)
GLUCOSE BLDC GLUCOMTR-MCNC: 177 MG/DL — HIGH (ref 70–99)
GLUCOSE BLDC GLUCOMTR-MCNC: 274 MG/DL — HIGH (ref 70–99)
GLUCOSE SERPL-MCNC: 98 MG/DL — SIGNIFICANT CHANGE UP (ref 70–99)
HCT VFR BLD CALC: 25.7 % — LOW (ref 39–50)
HGB BLD-MCNC: 8.4 G/DL — LOW (ref 13–17)
MAGNESIUM SERPL-MCNC: 1.8 MG/DL — SIGNIFICANT CHANGE UP (ref 1.6–2.6)
MCHC RBC-ENTMCNC: 27.7 PG — SIGNIFICANT CHANGE UP (ref 27–34)
MCHC RBC-ENTMCNC: 32.9 GM/DL — SIGNIFICANT CHANGE UP (ref 32–36)
MCV RBC AUTO: 84 FL — SIGNIFICANT CHANGE UP (ref 80–100)
PHOSPHATE SERPL-MCNC: 3.2 MG/DL — SIGNIFICANT CHANGE UP (ref 2.5–4.5)
PLATELET # BLD AUTO: 189 K/UL — SIGNIFICANT CHANGE UP (ref 150–400)
POTASSIUM SERPL-MCNC: 3.8 MMOL/L — SIGNIFICANT CHANGE UP (ref 3.5–5.3)
POTASSIUM SERPL-SCNC: 3.8 MMOL/L — SIGNIFICANT CHANGE UP (ref 3.5–5.3)
RBC # BLD: 3.06 M/UL — LOW (ref 4.2–5.8)
RBC # FLD: 17.9 % — HIGH (ref 10.3–14.5)
SODIUM SERPL-SCNC: 132 MMOL/L — LOW (ref 135–145)
WBC # BLD: 8.8 K/UL — SIGNIFICANT CHANGE UP (ref 3.8–10.5)
WBC # FLD AUTO: 8.8 K/UL — SIGNIFICANT CHANGE UP (ref 3.8–10.5)

## 2018-06-20 PROCEDURE — 99233 SBSQ HOSP IP/OBS HIGH 50: CPT | Mod: GC

## 2018-06-20 PROCEDURE — 99232 SBSQ HOSP IP/OBS MODERATE 35: CPT

## 2018-06-20 PROCEDURE — 99497 ADVNCD CARE PLAN 30 MIN: CPT | Mod: 25

## 2018-06-20 RX ORDER — HYDROMORPHONE HYDROCHLORIDE 2 MG/ML
1 INJECTION INTRAMUSCULAR; INTRAVENOUS; SUBCUTANEOUS EVERY 6 HOURS
Qty: 0 | Refills: 0 | Status: DISCONTINUED | OUTPATIENT
Start: 2018-06-20 | End: 2018-06-22

## 2018-06-20 RX ORDER — HYDROMORPHONE HYDROCHLORIDE 2 MG/ML
1 INJECTION INTRAMUSCULAR; INTRAVENOUS; SUBCUTANEOUS
Qty: 0 | Refills: 0 | Status: DISCONTINUED | OUTPATIENT
Start: 2018-06-20 | End: 2018-06-22

## 2018-06-20 RX ORDER — INSULIN LISPRO 100/ML
3 VIAL (ML) SUBCUTANEOUS
Qty: 0 | Refills: 0 | Status: DISCONTINUED | OUTPATIENT
Start: 2018-06-21 | End: 2018-06-22

## 2018-06-20 RX ADMIN — ONDANSETRON 8 MILLIGRAM(S): 8 TABLET, FILM COATED ORAL at 05:46

## 2018-06-20 RX ADMIN — HYDROMORPHONE HYDROCHLORIDE 1 MILLIGRAM(S): 2 INJECTION INTRAMUSCULAR; INTRAVENOUS; SUBCUTANEOUS at 18:21

## 2018-06-20 RX ADMIN — HYDROMORPHONE HYDROCHLORIDE 0.2 MILLIGRAM(S): 2 INJECTION INTRAMUSCULAR; INTRAVENOUS; SUBCUTANEOUS at 04:16

## 2018-06-20 RX ADMIN — HYDROMORPHONE HYDROCHLORIDE 0.2 MILLIGRAM(S): 2 INJECTION INTRAMUSCULAR; INTRAVENOUS; SUBCUTANEOUS at 05:00

## 2018-06-20 RX ADMIN — Medication 2 UNIT(S): at 09:46

## 2018-06-20 RX ADMIN — Medication 1 UNIT(S): at 18:17

## 2018-06-20 RX ADMIN — ONDANSETRON 8 MILLIGRAM(S): 8 TABLET, FILM COATED ORAL at 13:31

## 2018-06-20 RX ADMIN — ENOXAPARIN SODIUM 40 MILLIGRAM(S): 100 INJECTION SUBCUTANEOUS at 12:27

## 2018-06-20 RX ADMIN — CEFEPIME 100 MILLIGRAM(S): 1 INJECTION, POWDER, FOR SOLUTION INTRAMUSCULAR; INTRAVENOUS at 05:45

## 2018-06-20 RX ADMIN — ONDANSETRON 8 MILLIGRAM(S): 8 TABLET, FILM COATED ORAL at 21:45

## 2018-06-20 RX ADMIN — CEFEPIME 100 MILLIGRAM(S): 1 INJECTION, POWDER, FOR SOLUTION INTRAMUSCULAR; INTRAVENOUS at 13:36

## 2018-06-20 RX ADMIN — HYDROMORPHONE HYDROCHLORIDE 0.2 MILLIGRAM(S): 2 INJECTION INTRAMUSCULAR; INTRAVENOUS; SUBCUTANEOUS at 13:36

## 2018-06-20 RX ADMIN — INSULIN GLARGINE 5 UNIT(S): 100 INJECTION, SOLUTION SUBCUTANEOUS at 09:39

## 2018-06-20 RX ADMIN — Medication 100 MILLIGRAM(S): at 21:45

## 2018-06-20 RX ADMIN — Medication 3: at 13:31

## 2018-06-20 RX ADMIN — HYDROMORPHONE HYDROCHLORIDE 0.2 MILLIGRAM(S): 2 INJECTION INTRAMUSCULAR; INTRAVENOUS; SUBCUTANEOUS at 02:00

## 2018-06-20 RX ADMIN — Medication 1 TABLET(S): at 12:27

## 2018-06-20 RX ADMIN — Medication 2.5 MILLIGRAM(S): at 12:29

## 2018-06-20 RX ADMIN — Medication 500 MILLIGRAM(S): at 12:27

## 2018-06-20 RX ADMIN — Medication 1: at 18:16

## 2018-06-20 RX ADMIN — HYDROMORPHONE HYDROCHLORIDE 0.2 MILLIGRAM(S): 2 INJECTION INTRAMUSCULAR; INTRAVENOUS; SUBCUTANEOUS at 09:40

## 2018-06-20 RX ADMIN — SENNA PLUS 3 TABLET(S): 8.6 TABLET ORAL at 21:45

## 2018-06-20 RX ADMIN — Medication 2 UNIT(S): at 13:31

## 2018-06-20 NOTE — PROGRESS NOTE ADULT - SUBJECTIVE AND OBJECTIVE BOX
Diabetes Follow up note:  Interval Hx:  66 year old male w/ketosis prone T2DM w/metastatic neuroendocrine tumor a/w generalized weakness. No further hypoglycemic episodes, BG spiked to 274 at lunch today. Being followed by palliative team. Reports good appetite today.     Review of Systems:  GI: Tolerating POs without any N/V/D. + abdominal discomfort  CV: No CP/SOB  ENDO: No S&Sx of hypoglycemia  MEDS:    insulin glargine Injectable (LANTUS) 5 Unit(s) SubCutaneous every morning  insulin lispro (HumaLOG) corrective regimen sliding scale   SubCutaneous three times a day before meals  insulin lispro (HumaLOG) corrective regimen sliding scale   SubCutaneous at bedtime  insulin lispro Injectable (HumaLOG) 2 Unit(s) SubCutaneous before lunch  insulin lispro Injectable (HumaLOG) 2 Unit(s) SubCutaneous before breakfast  insulin lispro Injectable (HumaLOG) 1 Unit(s) SubCutaneous before dinner      Allergies    No Known Allergies        PE:  General: Male sitting in chair. NAD.   Vital Signs Last 24 Hrs  T(C): 36.9 (20 Jun 2018 14:08), Max: 36.9 (20 Jun 2018 04:19)  T(F): 98.5 (20 Jun 2018 14:08), Max: 98.5 (20 Jun 2018 14:08)  HR: 116 (20 Jun 2018 14:08) (112 - 116)  BP: 111/67 (20 Jun 2018 14:08) (105/65 - 116/76)  BP(mean): --  RR: 18 (20 Jun 2018 14:08) (16 - 18)  SpO2: 96% (20 Jun 2018 14:08) (93% - 96%)  Abd: Soft, NT,ND,   Extremities: Warm. no edema  Neuro: Awake and appropriate.     LABS:    POCT Blood Glucose.: 274 mg/dL (06-20-18 @ 13:14)  POCT Blood Glucose.: 100 mg/dL (06-20-18 @ 08:47)  POCT Blood Glucose.: 101 mg/dL (06-19-18 @ 21:51)  POCT Blood Glucose.: 94 mg/dL (06-19-18 @ 17:38)  POCT Blood Glucose.: 106 mg/dL (06-19-18 @ 13:29)  POCT Blood Glucose.: 78 mg/dL (06-19-18 @ 13:14)  POCT Blood Glucose.: 60 mg/dL (06-19-18 @ 12:56)  POCT Blood Glucose.: 68 mg/dL (06-19-18 @ 12:54)  POCT Blood Glucose.: 125 mg/dL (06-19-18 @ 08:48)  POCT Blood Glucose.: 90 mg/dL (06-18-18 @ 21:33)  POCT Blood Glucose.: 132 mg/dL (06-18-18 @ 17:33)  POCT Blood Glucose.: 281 mg/dL (06-18-18 @ 12:06)  POCT Blood Glucose.: 133 mg/dL (06-18-18 @ 08:37)  POCT Blood Glucose.: 117 mg/dL (06-17-18 @ 21:42)  POCT Blood Glucose.: 116 mg/dL (06-17-18 @ 17:35)                            8.4    8.8   )-----------( 189      ( 20 Jun 2018 07:27 )             25.7       06-20    132<L>  |  93<L>  |  11  ----------------------------<  98  3.8   |  29  |  0.44<L>    Ca    7.7<L>      20 Jun 2018 07:27  Phos  3.2     06-20  Mg     1.8     06-20        Thyroid Function Tests:  06-09 @ 08:24 TSH 1.90 FreeT4 -- T3 -- Anti TPO -- Anti Thyroglobulin Ab -- TSI --      Hemoglobin A1C, Whole Blood: 8.2 % <H> [4.0 - 5.6] (06-07-18 @ 03:08)            Contact number: elsa [phone] or [phone]

## 2018-06-20 NOTE — PROGRESS NOTE ADULT - PROBLEM SELECTOR PLAN 3
ECOG score 4, KPS 30%  Joined bedside meeting w/ Dr. Dick   Patient is not a candidate for further immunotherapy at this time  Patient and Family agrees for symptom management focus and home with hospice

## 2018-06-20 NOTE — PROGRESS NOTE ADULT - ASSESSMENT
69 y/o M w/ uncontrolled Type 2 DM ketosis prone w/ HTN and HLD admitted with weakness. No further hypoglycemic episodes. Goal is to prevent severe hyperglycemia and hypoglycemia given poor prognosis. BG goal (100-200mg/dl).

## 2018-06-20 NOTE — PROGRESS NOTE ADULT - NSHPATTENDINGPLANDISCUSS_GEN_ALL_CORE
Medicine Team
medicine team
Medicine Team: insulin adjustment
Medicine resident: insulin regimen
palliative, medicine team
palliative, medicine team

## 2018-06-20 NOTE — PROGRESS NOTE ADULT - PROBLEM SELECTOR PLAN 2
- + UA, will f/u urine culture- urine cx on 6/14 with growth of candida   - s/p 7 day course of cefepime that ended on 6/20  - unclear whether pt truly has UTI given cx negative for bacteria; fevers likely in setting of malignancy

## 2018-06-20 NOTE — PROGRESS NOTE ADULT - SUBJECTIVE AND OBJECTIVE BOX
Chief Complaint: metastatic high grade neuroendocrine tumor    INTERVAL HPI/OVERNIGHT EVENTS: Per family, continues to have periods of intermittent confusion. Currently awake but confused due to narcotics. Pain is now better controlled. Eating little. Minimally ambulatory. Wife, sister and daughter are at bedside.     MEDICATIONS  (STANDING):  ascorbic acid 500 milliGRAM(s) Oral daily  dextrose 5%. 1000 milliLiter(s) (50 mL/Hr) IV Continuous <Continuous>  dextrose 50% Injectable 12.5 Gram(s) IV Push once  dextrose 50% Injectable 25 Gram(s) IV Push once  dextrose 50% Injectable 25 Gram(s) IV Push once  docusate sodium 100 milliGRAM(s) Oral three times a day  dronabinol 2.5 milliGRAM(s) Oral daily  enoxaparin Injectable 40 milliGRAM(s) SubCutaneous daily  HYDROmorphone   Tablet 1 milliGRAM(s) Oral every 6 hours  insulin glargine Injectable (LANTUS) 5 Unit(s) SubCutaneous every morning  insulin lispro (HumaLOG) corrective regimen sliding scale   SubCutaneous three times a day before meals  insulin lispro (HumaLOG) corrective regimen sliding scale   SubCutaneous at bedtime  insulin lispro Injectable (HumaLOG) 2 Unit(s) SubCutaneous before lunch  insulin lispro Injectable (HumaLOG) 1 Unit(s) SubCutaneous before dinner  insulin lispro Injectable (HumaLOG) 3 Unit(s) SubCutaneous before breakfast  lactated ringers. 1000 milliLiter(s) (100 mL/Hr) IV Continuous <Continuous>  multivitamin 1 Tablet(s) Oral daily  ondansetron Injectable 8 milliGRAM(s) IV Push every 8 hours  polyethylene glycol 3350 17 Gram(s) Oral daily  senna 3 Tablet(s) Oral at bedtime    MEDICATIONS  (PRN):  acetaminophen   Tablet 650 milliGRAM(s) Oral every 6 hours PRN For Temp greater than 38 C (100.4 F)  bisacodyl Suppository 10 milliGRAM(s) Rectal daily PRN Constipation  dextrose 40% Gel 15 Gram(s) Oral once PRN Blood Glucose LESS THAN 70 milliGRAM(s)/deciliter  glucagon  Injectable 1 milliGRAM(s) IntraMuscular once PRN Glucose LESS THAN 70 milligrams/deciliter  HYDROmorphone   Tablet 1 milliGRAM(s) Oral every 2 hours PRN Breakthrough pain  LORazepam   Injectable 0.2 milliGRAM(s) IV Push every 12 hours PRN Anxiety  naloxone Injectable 0.1 milliGRAM(s) IV Push every 3 minutes PRN sedation or respiratory depression due to opioids      Allergies    No Known Allergies    Intolerances        ROS: as above     Vital Signs Last 24 Hrs  T(C): 36.9 (21 Jun 2018 14:38), Max: 37.1 (20 Jun 2018 21:18)  T(F): 98.5 (21 Jun 2018 14:38), Max: 98.8 (20 Jun 2018 21:18)  HR: 115 (21 Jun 2018 14:38) (107 - 115)  BP: 127/81 (21 Jun 2018 14:38) (111/64 - 135/77)  BP(mean): --  RR: 18 (21 Jun 2018 14:38) (18 - 18)  SpO2: 97% (21 Jun 2018 14:38) (93% - 97%)    Physical Exam:   AAO x 1-2, NAD   cachectic  RRR S1S2  CTA b/l   soft NTNDBS+  no c/c/e    LABS:                        8.8    8.1   )-----------( 197      ( 21 Jun 2018 09:05 )             26.3     06-21    133<L>  |  94<L>  |  9   ----------------------------<  132<H>  3.9   |  28  |  0.40<L>    Ca    7.7<L>      21 Jun 2018 09:05  Phos  3.2     06-21  Mg     1.8     06-21

## 2018-06-20 NOTE — PROGRESS NOTE ADULT - SUBJECTIVE AND OBJECTIVE BOX
INTERVAL HPI/OVERNIGHT EVENTS: Worsening pain and confusion overnight. At this time the patient is lethargic but arousable. He knew he was in [employer]. He is c/o severe pain over his left hip and back. He is not able to indicate the characteristics of his pain. He is also c/o visual hallucinations.   Allergies  No Known Allergies  Intolerances    ADVANCE DIRECTIVES:  DNR/DNI  DNR [x]YES  MOLST [ ] YES [x ] NO     MEDICATIONS  (STANDING):  ascorbic acid 500 milliGRAM(s) Oral daily  dextrose 5%. 1000 milliLiter(s) (50 mL/Hr) IV Continuous <Continuous>  dextrose 50% Injectable 12.5 Gram(s) IV Push once  dextrose 50% Injectable 25 Gram(s) IV Push once  dextrose 50% Injectable 25 Gram(s) IV Push once  docusate sodium 100 milliGRAM(s) Oral three times a day  dronabinol 2.5 milliGRAM(s) Oral daily  enoxaparin Injectable 40 milliGRAM(s) SubCutaneous daily  HYDROmorphone   Tablet 1 milliGRAM(s) Oral every 6 hours  insulin glargine Injectable (LANTUS) 5 Unit(s) SubCutaneous every morning  insulin lispro (HumaLOG) corrective regimen sliding scale   SubCutaneous three times a day before meals  insulin lispro (HumaLOG) corrective regimen sliding scale   SubCutaneous at bedtime  insulin lispro Injectable (HumaLOG) 2 Unit(s) SubCutaneous before lunch  insulin lispro Injectable (HumaLOG) 1 Unit(s) SubCutaneous before dinner  lactated ringers. 1000 milliLiter(s) (100 mL/Hr) IV Continuous <Continuous>  multivitamin 1 Tablet(s) Oral daily  ondansetron Injectable 8 milliGRAM(s) IV Push every 8 hours  polyethylene glycol 3350 17 Gram(s) Oral daily  senna 3 Tablet(s) Oral at bedtime    MEDICATIONS  (PRN):  acetaminophen   Tablet 650 milliGRAM(s) Oral every 6 hours PRN For Temp greater than 38 C (100.4 F)  bisacodyl Suppository 10 milliGRAM(s) Rectal daily PRN Constipation  dextrose 40% Gel 15 Gram(s) Oral once PRN Blood Glucose LESS THAN 70 milliGRAM(s)/deciliter  glucagon  Injectable 1 milliGRAM(s) IntraMuscular once PRN Glucose LESS THAN 70 milligrams/deciliter  HYDROmorphone   Tablet 1 milliGRAM(s) Oral every 2 hours PRN Breakthrough pain  LORazepam   Injectable 0.2 milliGRAM(s) IV Push every 12 hours PRN Anxiety  naloxone Injectable 0.1 milliGRAM(s) IV Push every 3 minutes PRN sedation or respiratory depression due to opioids      PRESENT SYMPTOMS:  Source: [x ] Patient   [x ] Family   [ ] Team     Pain:                        [ ] No [ x] Yes             [ ] Mild [ ] Moderate [x ] Severe PAIN AD 7    Before his pain has been described as:   Onset - on deep palpation   Location - RLQ  Duration - intermittent  Character - sharp  Alleviating/Aggravating - aggravated or apparent w/ palpation  Radiation -   Timing -    Dyspnea:                [x] No [ ] Yes             [ ] Mild [ ] Moderate [ ] Severe    Anxiety:                  [x ] No [ ] Yes             [ ] Mild [ ] Moderate [ ] Severe    Fatigue:                  [ ] No [x] Yes             [x ] Mild [ ] Moderate [ ] Severe    Nausea:                  [  ] No [x] Yes             [ x] Mild [ ] Moderate [ ] Severe    Loss of appetite:   [ ] No [x] Yes             [x ] Mild [ ] Moderate [ ] Severe    Constipation:        [x] No [ ] Yes             [ ] Mild [ ] Moderate [ ] Severe    Other Symptoms:  [x ] All other review of systems negative   [ ] Unable to obtain due to poor mentation     Karnofsky Performance Score/Palliative Performance Status Version 2:  30  %    PHYSICAL EXAM:  Vital Signs Last 24 Hrs  T(C): 36.9 (20 Jun 2018 14:08), Max: 36.9 (20 Jun 2018 04:19)  T(F): 98.5 (20 Jun 2018 14:08), Max: 98.5 (20 Jun 2018 14:08)  HR: 116 (20 Jun 2018 14:08) (112 - 116)  BP: 111/67 (20 Jun 2018 14:08) (105/65 - 116/76)  BP(mean): --  RR: 18 (20 Jun 2018 14:08) (16 - 18)  SpO2: 96% (20 Jun 2018 14:08) (93% - 96%)    General:  [x ] Alert  [x ] Oriented x  2    [ ] Lethargic  [ ] Agitated   [ ] Cachexia   [ ] Unarousable  [x ] Verbal  [ ] Non-Verbal [x] Ill appearing male in distress due to pain.     HEENT:  [x ] Normal   [ ] Dry mouth   [ ] ET Tube    [ ] Trach  [ ] Oral lesions    Lungs:   [x ] Clear [ ] Tachypnea  [ ] Audible excessive secretions   [ ] Rhonchi        [ ] Right [ ] Left [ ] Bilateral  [ ] Crackles        [ ] Right [ ] Left [ ] Bilateral  [ ] Wheezing     [ ] Right [ ] Left [ ] Bilateral    Cardiovascular:  [x ] Regular [ ] Irregular [ ] Tachycardia   [ ] Bradycardia  [ ] Murmur [ ] Other    Abdomen: [ x] Soft  [ ] Distended   [ ] +BS  [ ] Non tender [ ] Tender  [ ]PEG   [ ]OGT/ NGT   Last BM: 6/20    Genitourinary: [ ] Normal [x ] Incontinent   [ ] Oliguria/Anuria   [ ] Moore    Musculoskeletal:  [ ] Normal   [ ] Weakness  [ x] Bedbound/Wheelchair bound [ ] Edema    Neurological: [ ] No focal deficits  [ ] Cognitive impairment  [ ] Dysphagia [ ] Dysarthria [ ] Paresis [x ] Other: Lethargic     Skin: [ ] Normal   [x ] Pressure ulcer(s)                  [ ] Rash    LABS:                                   8.4    8.8   )-----------( 189      ( 20 Jun 2018 07:27 )             25.7     06-20    132<L>  |  93<L>  |  11  ----------------------------<  98  3.8   |  29  |  0.44<L>    Ca    7.7<L>      20 Jun 2018 07:27  Phos  3.2     06-20  Mg     1.8     06-20      Shock: [ ] Septic [ ] Cardiogenic [ ] Neurologic [ ] Hypovolemic  Vasopressors x   Inotrophs x     Oral Intake: [ ] Unable/mouth care only [x ] Minimal [ ] Moderate [ ] Full Capability    Diet: [ ] NPO [ ] Tube feeds [ ] TPN [ ] Other     RADIOLOGY & ADDITIONAL STUDIES:  EXAM:  CT ABDOMEN AND PELVIS IC                            PROCEDURE DATE:  06/18/2018    INTERPRETATION:  CLINICAL INFORMATION: Metastatic neuroendocrine tumor.   Elevated alkaline phosphatase and sepsis. Concern for cholangitis.     COMPARISON:   1.  CT Chest on 6/16/2018  2.  CT Chest/abdomen/pelvis on 5/30/2018  3.  PET/CT on 4/16/2018  4.  Abdominal MRI on 11/24/2017IMPRESSION:     No biliary ductal dilatation or evidence of cholangitis.    Bilobar hepatic metastatic disease with suggestion of progression   compared with prior study 5/20/2018.    Pulmonary and osseous metastatic disease without significant change.      REFERRALS:   [ ] Chaplaincy  [x ] Hospice  [ ] Child Life  [ ] Social Work  [ ] Case management [ ] Holistic Therapy   Jasvir Camara MD [id number] INTERVAL HPI/OVERNIGHT EVENTS: Patient's mental status has improved. He is alert and able to f/u a simple conversation however, he still weak, debilitated, and not really able to f/u a GOC discussion.     Allergies  No Known Allergies  Intolerances    ADVANCE DIRECTIVES:  DNR/DNI  DNR [x]YES  MOLST [ ] YES [x ] NO     MEDICATIONS  (STANDING):  ascorbic acid 500 milliGRAM(s) Oral daily  dextrose 5%. 1000 milliLiter(s) (50 mL/Hr) IV Continuous <Continuous>  dextrose 50% Injectable 12.5 Gram(s) IV Push once  dextrose 50% Injectable 25 Gram(s) IV Push once  dextrose 50% Injectable 25 Gram(s) IV Push once  docusate sodium 100 milliGRAM(s) Oral three times a day  dronabinol 2.5 milliGRAM(s) Oral daily  enoxaparin Injectable 40 milliGRAM(s) SubCutaneous daily  HYDROmorphone   Tablet 1 milliGRAM(s) Oral every 6 hours  insulin glargine Injectable (LANTUS) 5 Unit(s) SubCutaneous every morning  insulin lispro (HumaLOG) corrective regimen sliding scale   SubCutaneous three times a day before meals  insulin lispro (HumaLOG) corrective regimen sliding scale   SubCutaneous at bedtime  insulin lispro Injectable (HumaLOG) 2 Unit(s) SubCutaneous before lunch  insulin lispro Injectable (HumaLOG) 1 Unit(s) SubCutaneous before dinner  lactated ringers. 1000 milliLiter(s) (100 mL/Hr) IV Continuous <Continuous>  multivitamin 1 Tablet(s) Oral daily  ondansetron Injectable 8 milliGRAM(s) IV Push every 8 hours  polyethylene glycol 3350 17 Gram(s) Oral daily  senna 3 Tablet(s) Oral at bedtime    MEDICATIONS  (PRN):  acetaminophen   Tablet 650 milliGRAM(s) Oral every 6 hours PRN For Temp greater than 38 C (100.4 F)  bisacodyl Suppository 10 milliGRAM(s) Rectal daily PRN Constipation  dextrose 40% Gel 15 Gram(s) Oral once PRN Blood Glucose LESS THAN 70 milliGRAM(s)/deciliter  glucagon  Injectable 1 milliGRAM(s) IntraMuscular once PRN Glucose LESS THAN 70 milligrams/deciliter  HYDROmorphone   Tablet 1 milliGRAM(s) Oral every 2 hours PRN Breakthrough pain  LORazepam   Injectable 0.2 milliGRAM(s) IV Push every 12 hours PRN Anxiety  naloxone Injectable 0.1 milliGRAM(s) IV Push every 3 minutes PRN sedation or respiratory depression due to opioids      PRESENT SYMPTOMS:  Source: [x ] Patient   [x ] Family   [ ] Team     Pain:                        [ ] No [ x] Yes             [x ] Mild [ ] Moderate [ ] Severe       Onset - on deep palpation   Location - RLQ  Duration - intermittent  Character - sharp  Alleviating/Aggravating - aggravated or apparent w/ palpation  Radiation -   Timing -    Dyspnea:                [x] No [ ] Yes             [ ] Mild [ ] Moderate [ ] Severe    Anxiety:                  [x ] No [ ] Yes             [ ] Mild [ ] Moderate [ ] Severe    Fatigue:                  [ ] No [x] Yes             [x ] Mild [ ] Moderate [ ] Severe    Nausea:                  [  ] No [x] Yes             [ x] Mild [ ] Moderate [ ] Severe    Loss of appetite:   [ ] No [x] Yes             [x ] Mild [ ] Moderate [ ] Severe    Constipation:        [x] No [ ] Yes             [ ] Mild [ ] Moderate [ ] Severe    Other Symptoms:  [x ] All other review of systems negative   [ ] Unable to obtain due to poor mentation     Karnofsky Performance Score/Palliative Performance Status Version 2:  30  %    PHYSICAL EXAM:  Vital Signs Last 24 Hrs  T(C): 36.9 (20 Jun 2018 14:08), Max: 36.9 (20 Jun 2018 04:19)  T(F): 98.5 (20 Jun 2018 14:08), Max: 98.5 (20 Jun 2018 14:08)  HR: 116 (20 Jun 2018 14:08) (112 - 116)  BP: 111/67 (20 Jun 2018 14:08) (105/65 - 116/76)  BP(mean): --  RR: 18 (20 Jun 2018 14:08) (16 - 18)  SpO2: 96% (20 Jun 2018 14:08) (93% - 96%)    General:  [x ] Alert  [x ] Oriented x  2    [ ] Lethargic  [ ] Agitated   [ ] Cachexia   [ ] Unarousable  [x ] Verbal  [ ] Non-Verbal [x] Ill appearing male in distress due to pain.     HEENT:  [x ] Normal   [ ] Dry mouth   [ ] ET Tube    [ ] Trach  [ ] Oral lesions    Lungs:   [x ] Clear [ ] Tachypnea  [ ] Audible excessive secretions   [ ] Rhonchi        [ ] Right [ ] Left [ ] Bilateral  [ ] Crackles        [ ] Right [ ] Left [ ] Bilateral  [ ] Wheezing     [ ] Right [ ] Left [ ] Bilateral    Cardiovascular:  [x ] Regular [ ] Irregular [ ] Tachycardia   [ ] Bradycardia  [ ] Murmur [ ] Other    Abdomen: [ x] Soft  [ ] Distended   [ ] +BS  [ ] Non tender [ ] Tender  [ ]PEG   [ ]OGT/ NGT   Last BM: 6/20    Genitourinary: [ ] Normal [x ] Incontinent   [ ] Oliguria/Anuria   [ ] Moore    Musculoskeletal:  [ ] Normal   [ ] Weakness  [ x] Bedbound/Wheelchair bound [ ] Edema    Neurological: [ ] No focal deficits  [ ] Cognitive impairment  [ ] Dysphagia [ ] Dysarthria [ ] Paresis [x ] Other: Lethargic     Skin: [ ] Normal   [x ] Pressure ulcer(s)                  [ ] Rash    LABS:                                   8.4    8.8   )-----------( 189      ( 20 Jun 2018 07:27 )             25.7     06-20    132<L>  |  93<L>  |  11  ----------------------------<  98  3.8   |  29  |  0.44<L>    Ca    7.7<L>      20 Jun 2018 07:27  Phos  3.2     06-20  Mg     1.8     06-20      Shock: [ ] Septic [ ] Cardiogenic [ ] Neurologic [ ] Hypovolemic  Vasopressors x   Inotrophs x     Oral Intake: [ ] Unable/mouth care only [x ] Minimal [ ] Moderate [ ] Full Capability    Diet: [ ] NPO [ ] Tube feeds [ ] TPN [ ] Other     RADIOLOGY & ADDITIONAL STUDIES:  EXAM:  CT ABDOMEN AND PELVIS IC                            PROCEDURE DATE:  06/18/2018    INTERPRETATION:  CLINICAL INFORMATION: Metastatic neuroendocrine tumor.   Elevated alkaline phosphatase and sepsis. Concern for cholangitis.     COMPARISON:   1.  CT Chest on 6/16/2018  2.  CT Chest/abdomen/pelvis on 5/30/2018  3.  PET/CT on 4/16/2018  4.  Abdominal MRI on 11/24/2017IMPRESSION:     No biliary ductal dilatation or evidence of cholangitis.    Bilobar hepatic metastatic disease with suggestion of progression   compared with prior study 5/20/2018.    Pulmonary and osseous metastatic disease without significant change.      REFERRALS:   [ ] Chaplaincy  [x ] Hospice  [ ] Child Life  [ ] Social Work  [ ] Case management [ ] Holistic Therapy   aJsvir Camara MD [phone]

## 2018-06-20 NOTE — PROGRESS NOTE ADULT - PROBLEM SELECTOR PLAN 1
-test BG AC/HS  -c/w Lantus 5 units QAM  -Adjust Humalog 3 units w/breakfast, 2 units w/lunch, 1 unit w/dinner (give after pt eats >50% of meal)  -c/w Humalog low correction scale AC and Low HS scale  -can be discharged home on Prandin 0.5 mg w/meals + Lantus 5 units QAM  -discussed w/pt and team  pager: [phone]

## 2018-06-20 NOTE — PROGRESS NOTE ADULT - SUBJECTIVE AND OBJECTIVE BOX
Patient is a 68y old  Male who presents with a chief complaint of generalized weakness (14 Jun 2018 19:35)      Overnight Events:  No acute events ON.     REVIEW OF SYSTEMS:  CONSTITUTIONAL: No weakness, fevers or chills  EYES/ENT: No visual changes, no throat pain   RESPIRATORY: No cough, wheezing, hemoptysis; No shortness of breath  CARDIOVASCULAR: No chest pain or palpitations  GASTROINTESTINAL: Positive for abdominal pain  GENITOURINARY: No dysuria, frequency or hematuria  NEUROLOGICAL: No dizziness, numbness, or weakness  SKIN: No itching, burning, rashes, or lesions   All other review of systems is negative unless indicated above.    VITAL SIGNS:  Vital Signs Last 24 Hrs  T(C): 36.9 (20 Jun 2018 14:08), Max: 36.9 (20 Jun 2018 04:19)  T(F): 98.5 (20 Jun 2018 14:08), Max: 98.5 (20 Jun 2018 14:08)  HR: 116 (20 Jun 2018 14:08) (112 - 116)  BP: 111/67 (20 Jun 2018 14:08) (105/65 - 116/76)  BP(mean): --  RR: 18 (20 Jun 2018 14:08) (16 - 18)  SpO2: 96% (20 Jun 2018 14:08) (93% - 96%)    PHYSICAL EXAM:   GENERAL: no acute distress  HEENT: NC/AT, EOMI, neck supple, MMM  RESPIRATORY: LCTAB/L, no rhonchi, rales, or wheezing  CARDIOVASCULAR: RRR, no murmurs, gallops, rubs  ABDOMINAL: Less RUQ and LUQ tenderness to palpation from prior  EXTREMITIES: no clubbing, cyanosis, or edema  NEUROLOGICAL:  non-focal  SKIN: no rashes or lesions   MUSCULOSKELETAL: no gross joint deformity                          8.4    8.8   )-----------( 189      ( 20 Jun 2018 07:27 )             25.7     06-20    132<L>  |  93<L>  |  11  ----------------------------<  98  3.8   |  29  |  0.44<L>    Ca    7.7<L>      20 Jun 2018 07:27  Phos  3.2     06-20  Mg     1.8     06-20          CAPILLARY BLOOD GLUCOSE      POCT Blood Glucose.: 274 mg/dL (20 Jun 2018 13:14)  POCT Blood Glucose.: 100 mg/dL (20 Jun 2018 08:47)  POCT Blood Glucose.: 101 mg/dL (19 Jun 2018 21:51)  POCT Blood Glucose.: 94 mg/dL (19 Jun 2018 17:38)    I&O's Summary    19 Jun 2018 07:01  -  20 Jun 2018 07:00  --------------------------------------------------------  IN: 2080 mL / OUT: 650 mL / NET: 1430 mL        MEDICATIONS  (STANDING):  ascorbic acid 500 milliGRAM(s) Oral daily  dextrose 5%. 1000 milliLiter(s) (50 mL/Hr) IV Continuous <Continuous>  dextrose 50% Injectable 12.5 Gram(s) IV Push once  dextrose 50% Injectable 25 Gram(s) IV Push once  dextrose 50% Injectable 25 Gram(s) IV Push once  docusate sodium 100 milliGRAM(s) Oral three times a day  dronabinol 2.5 milliGRAM(s) Oral daily  enoxaparin Injectable 40 milliGRAM(s) SubCutaneous daily  HYDROmorphone  Injectable 0.2 milliGRAM(s) IV Push every 6 hours  insulin glargine Injectable (LANTUS) 5 Unit(s) SubCutaneous every morning  insulin lispro (HumaLOG) corrective regimen sliding scale   SubCutaneous three times a day before meals  insulin lispro (HumaLOG) corrective regimen sliding scale   SubCutaneous at bedtime  insulin lispro Injectable (HumaLOG) 2 Unit(s) SubCutaneous before lunch  insulin lispro Injectable (HumaLOG) 2 Unit(s) SubCutaneous before breakfast  insulin lispro Injectable (HumaLOG) 1 Unit(s) SubCutaneous before dinner  lactated ringers. 1000 milliLiter(s) (100 mL/Hr) IV Continuous <Continuous>  multivitamin 1 Tablet(s) Oral daily  ondansetron Injectable 8 milliGRAM(s) IV Push every 8 hours  polyethylene glycol 3350 17 Gram(s) Oral daily  senna 3 Tablet(s) Oral at bedtime

## 2018-06-20 NOTE — PROGRESS NOTE ADULT - ASSESSMENT
68 M w/ metastatic high grade neuroendocrine ca involving liver, bone and lung. Pt is s/p 6 cycles Carbo/Etoposide completed in 3/18, then RT to C and L spine bony lesions, noted to have POD and decrease in functional status. Plan for combined immunotherapy as an out pt but a/w FTT, uncontrolled pain and confusion pt states, " I have been achy, coughing  , runny nose, sore throat and fever that started on Tuesday "

## 2018-06-20 NOTE — PROGRESS NOTE ADULT - PROBLEM SELECTOR PLAN 1
-Resolved  -Borderline temperatures likely in setting of tumor fevers  -CT chest w/o evidence of PNA  time as no suspicion for MRSA  -CT A/P without evidence of cholangitis or cholecystitis  -Fevers likely in setting of malignancy with high tumor burden  -urine cx with growth of yeast, no urinary sxs  -completed course of Cefepime x 7 days on 6/20

## 2018-06-20 NOTE — PROGRESS NOTE ADULT - ASSESSMENT
Patient is a 67 y/o M with significant PMH of metastatic neuroendocrine carcinoma (unknown primary, diagnosed via iliac bone biopsy, w/ pulmonary mets, mediastinal/hilar lymphadenopathy, hepatic mets, diffuse bone mets, s/p chemo/RT w/ progression of disease), DM2 (recent admission for DKA), HTN presented from [facility] with generalized weakness with sepsis 2/2 PNA vs. UTI.  Palliative care following for pain management and ongoing GOC. Patient is a 69 y/o M with significant PMH of metastatic neuroendocrine carcinoma (unknown primary, diagnosed via iliac bone biopsy, w/ pulmonary mets, mediastinal/hilar lymphadenopathy, hepatic mets, diffuse bone mets, s/p chemo/RT w/ progression of disease), DM2 (recent admission for DKA), HTN presented from [facility] with generalized weakness with sepsis 2/2 PNA vs. UTI. Hospitalizations complicated by Delirium.  Palliative care following for pain management and ongoing GOC.

## 2018-06-20 NOTE — PROGRESS NOTE ADULT - PROBLEM SELECTOR PLAN 1
Family meeting took place today with Jairo family and palliative care at bedside. We discussed that pt is not significantly improving and we have not identified any reversible causes to explain decompensation. As a result, the pt's decline in functional status is 2/2 disease.   I do not recommend any further disease modifying therapy of any kind.   Recommend home with hospice services.   Discussed prognosis of days-months.   Continue pain management, reorientation for periods of confusion, encourage PO intake, ambulation as tolerated.   Above all was explained via  in detail. All questions answered.   Emotional support provided.

## 2018-06-20 NOTE — PROGRESS NOTE ADULT - PROBLEM SELECTOR PLAN 5
DNR/DNI   HCP- daughter Yari   Joined in on meeting w/ Oncology (Dr. Dick) at bedside. No further DMT offered d/t patients declining functional status. Treatment of symptoms have somewhat improved patients mental status and quality of life. Patient, patients wife, daughter Yari, and sister agree to focus on symptom management and to bring him home with hospice. IV medications switched to po.   Hospice referral made. DNR/DNI   HCP- daughter Yari   Joined in on meeting w/ Oncology (Dr. Dick) at bedside. No further DMT offered d/t patients declining functional status. Treatment of symptoms have somewhat improved patients mental status and quality of life. Patient, patients wife, daughter Yari, and sister agree to focus on symptom management and to bring him home with hospice. IV medications switched to po.   Hospice referral made.  Kyrgyz phone  was used during the family meeting.  was called by Dr Dick. DNR/DNI   HCP- daughter Yari   Joined in on meeting w/ Oncology (Dr. Dick) at bedside. No further DMT offered d/t patients declining functional status. Treatment of symptoms have somewhat improved patients mental status and quality of life. Patient, patients wife, daughter Yari, and sister agree to focus on symptom management and to bring him home with hospice. IV medications switched to po.   Hospice referral made.  16' were spent by Dr Camara in [facility] during this meeting   Luxembourgish phone  was used during the family meeting.  was called by Dr Dick.

## 2018-06-20 NOTE — PROGRESS NOTE ADULT - PROBLEM SELECTOR PLAN 1
Due to AMS and liver impairment will use Dilaudid for pain management  Will convert IV Dilaudid to PO:  - Dilaudid 1 mg po Q6H ATC   - Dilaudid 1 mg po Q2H PRN for breakthrough pain Due to AMS and liver impairment will use Dilaudid for pain management  Will convert IV Dilaudid to PO:  - Dilaudid 1 mg po Q6H ATC   - Dilaudid 1 mg po Q2H PRN for breakthrough pain  Above regimen discussed with pharmacy. Pills can be broken in half to make 1 mg po dose.

## 2018-06-20 NOTE — PROGRESS NOTE ADULT - PROBLEM SELECTOR PLAN 2
Multifactorial (infection, metabolic, medications, constipation)   C/w ativan 0.25 q 12 PRN   Completed course of abx  Management as per primary team Multifactorial (infection, metabolic, medications, constipation)   Opioids were rotated   Ativan was decreased to 0.25 q 12 PRN   Completed course of Abx   Management as per primary team

## 2018-06-20 NOTE — PROGRESS NOTE ADULT - PROBLEM SELECTOR PLAN 10
-Palliative consult as above  -Pt now DNR after discussion with palliative; GOC discussions with wife    Dispo: Pending successful transition from IV to PO dilaudid; possibly Thursday

## 2018-06-21 ENCOUNTER — TRANSCRIPTION ENCOUNTER (OUTPATIENT)
Age: 68
End: 2018-06-21

## 2018-06-21 LAB
ANION GAP SERPL CALC-SCNC: 11 MMOL/L — SIGNIFICANT CHANGE UP (ref 5–17)
BUN SERPL-MCNC: 9 MG/DL — SIGNIFICANT CHANGE UP (ref 7–23)
CALCIUM SERPL-MCNC: 7.7 MG/DL — LOW (ref 8.4–10.5)
CHLORIDE SERPL-SCNC: 94 MMOL/L — LOW (ref 96–108)
CO2 SERPL-SCNC: 28 MMOL/L — SIGNIFICANT CHANGE UP (ref 22–31)
CREAT SERPL-MCNC: 0.4 MG/DL — LOW (ref 0.5–1.3)
CULTURE RESULTS: SIGNIFICANT CHANGE UP
CULTURE RESULTS: SIGNIFICANT CHANGE UP
GLUCOSE BLDC GLUCOMTR-MCNC: 170 MG/DL — HIGH (ref 70–99)
GLUCOSE BLDC GLUCOMTR-MCNC: 177 MG/DL — HIGH (ref 70–99)
GLUCOSE BLDC GLUCOMTR-MCNC: 185 MG/DL — HIGH (ref 70–99)
GLUCOSE BLDC GLUCOMTR-MCNC: 190 MG/DL — HIGH (ref 70–99)
GLUCOSE SERPL-MCNC: 132 MG/DL — HIGH (ref 70–99)
HCT VFR BLD CALC: 26.3 % — LOW (ref 39–50)
HGB BLD-MCNC: 8.8 G/DL — LOW (ref 13–17)
MAGNESIUM SERPL-MCNC: 1.8 MG/DL — SIGNIFICANT CHANGE UP (ref 1.6–2.6)
MCHC RBC-ENTMCNC: 27.9 PG — SIGNIFICANT CHANGE UP (ref 27–34)
MCHC RBC-ENTMCNC: 33.3 GM/DL — SIGNIFICANT CHANGE UP (ref 32–36)
MCV RBC AUTO: 83.9 FL — SIGNIFICANT CHANGE UP (ref 80–100)
PHOSPHATE SERPL-MCNC: 3.2 MG/DL — SIGNIFICANT CHANGE UP (ref 2.5–4.5)
PLATELET # BLD AUTO: 197 K/UL — SIGNIFICANT CHANGE UP (ref 150–400)
POTASSIUM SERPL-MCNC: 3.9 MMOL/L — SIGNIFICANT CHANGE UP (ref 3.5–5.3)
POTASSIUM SERPL-SCNC: 3.9 MMOL/L — SIGNIFICANT CHANGE UP (ref 3.5–5.3)
RBC # BLD: 3.14 M/UL — LOW (ref 4.2–5.8)
RBC # FLD: 18 % — HIGH (ref 10.3–14.5)
SODIUM SERPL-SCNC: 133 MMOL/L — LOW (ref 135–145)
SPECIMEN SOURCE: SIGNIFICANT CHANGE UP
SPECIMEN SOURCE: SIGNIFICANT CHANGE UP
WBC # BLD: 8.1 K/UL — SIGNIFICANT CHANGE UP (ref 3.8–10.5)
WBC # FLD AUTO: 8.1 K/UL — SIGNIFICANT CHANGE UP (ref 3.8–10.5)

## 2018-06-21 PROCEDURE — 99233 SBSQ HOSP IP/OBS HIGH 50: CPT

## 2018-06-21 PROCEDURE — 99232 SBSQ HOSP IP/OBS MODERATE 35: CPT

## 2018-06-21 PROCEDURE — 99233 SBSQ HOSP IP/OBS HIGH 50: CPT | Mod: GC

## 2018-06-21 RX ORDER — POLYETHYLENE GLYCOL 3350 17 G/17G
17 POWDER, FOR SOLUTION ORAL DAILY
Qty: 0 | Refills: 0 | Status: DISCONTINUED | OUTPATIENT
Start: 2018-06-21 | End: 2018-06-22

## 2018-06-21 RX ORDER — SENNA PLUS 8.6 MG/1
2 TABLET ORAL AT BEDTIME
Qty: 0 | Refills: 0 | Status: DISCONTINUED | OUTPATIENT
Start: 2018-06-21 | End: 2018-06-22

## 2018-06-21 RX ADMIN — ONDANSETRON 8 MILLIGRAM(S): 8 TABLET, FILM COATED ORAL at 06:21

## 2018-06-21 RX ADMIN — Medication 1: at 17:55

## 2018-06-21 RX ADMIN — HYDROMORPHONE HYDROCHLORIDE 1 MILLIGRAM(S): 2 INJECTION INTRAMUSCULAR; INTRAVENOUS; SUBCUTANEOUS at 06:35

## 2018-06-21 RX ADMIN — HYDROMORPHONE HYDROCHLORIDE 1 MILLIGRAM(S): 2 INJECTION INTRAMUSCULAR; INTRAVENOUS; SUBCUTANEOUS at 23:07

## 2018-06-21 RX ADMIN — SENNA PLUS 2 TABLET(S): 8.6 TABLET ORAL at 22:37

## 2018-06-21 RX ADMIN — ONDANSETRON 8 MILLIGRAM(S): 8 TABLET, FILM COATED ORAL at 13:12

## 2018-06-21 RX ADMIN — Medication 1 TABLET(S): at 11:38

## 2018-06-21 RX ADMIN — Medication 3 UNIT(S): at 08:55

## 2018-06-21 RX ADMIN — Medication 100 MILLIGRAM(S): at 22:37

## 2018-06-21 RX ADMIN — HYDROMORPHONE HYDROCHLORIDE 1 MILLIGRAM(S): 2 INJECTION INTRAMUSCULAR; INTRAVENOUS; SUBCUTANEOUS at 17:54

## 2018-06-21 RX ADMIN — Medication 100 MILLIGRAM(S): at 13:12

## 2018-06-21 RX ADMIN — Medication 100 MILLIGRAM(S): at 06:20

## 2018-06-21 RX ADMIN — HYDROMORPHONE HYDROCHLORIDE 1 MILLIGRAM(S): 2 INJECTION INTRAMUSCULAR; INTRAVENOUS; SUBCUTANEOUS at 06:20

## 2018-06-21 RX ADMIN — Medication 1 UNIT(S): at 17:55

## 2018-06-21 RX ADMIN — ONDANSETRON 8 MILLIGRAM(S): 8 TABLET, FILM COATED ORAL at 22:37

## 2018-06-21 RX ADMIN — HYDROMORPHONE HYDROCHLORIDE 1 MILLIGRAM(S): 2 INJECTION INTRAMUSCULAR; INTRAVENOUS; SUBCUTANEOUS at 11:37

## 2018-06-21 RX ADMIN — HYDROMORPHONE HYDROCHLORIDE 1 MILLIGRAM(S): 2 INJECTION INTRAMUSCULAR; INTRAVENOUS; SUBCUTANEOUS at 00:12

## 2018-06-21 RX ADMIN — Medication 0.2 MILLIGRAM(S): at 01:15

## 2018-06-21 RX ADMIN — Medication 1: at 13:11

## 2018-06-21 RX ADMIN — HYDROMORPHONE HYDROCHLORIDE 1 MILLIGRAM(S): 2 INJECTION INTRAMUSCULAR; INTRAVENOUS; SUBCUTANEOUS at 00:56

## 2018-06-21 RX ADMIN — Medication 2 UNIT(S): at 13:11

## 2018-06-21 RX ADMIN — Medication 2.5 MILLIGRAM(S): at 11:41

## 2018-06-21 RX ADMIN — POLYETHYLENE GLYCOL 3350 17 GRAM(S): 17 POWDER, FOR SOLUTION ORAL at 11:39

## 2018-06-21 RX ADMIN — ENOXAPARIN SODIUM 40 MILLIGRAM(S): 100 INJECTION SUBCUTANEOUS at 11:38

## 2018-06-21 RX ADMIN — HYDROMORPHONE HYDROCHLORIDE 1 MILLIGRAM(S): 2 INJECTION INTRAMUSCULAR; INTRAVENOUS; SUBCUTANEOUS at 22:37

## 2018-06-21 RX ADMIN — Medication 500 MILLIGRAM(S): at 11:38

## 2018-06-21 RX ADMIN — Medication 1: at 08:56

## 2018-06-21 RX ADMIN — INSULIN GLARGINE 5 UNIT(S): 100 INJECTION, SOLUTION SUBCUTANEOUS at 08:56

## 2018-06-21 NOTE — PROGRESS NOTE ADULT - PROBLEM SELECTOR PLAN 2
Multifactorial (infection, metabolic, medications, constipation)   Opioids were rotated   Ativan was decreased to 0.25 q 12 PRN (1 BT over 24 hours)  Completed course of Abx   Management as per primary team

## 2018-06-21 NOTE — DISCHARGE NOTE ADULT - MEDICATION SUMMARY - MEDICATIONS TO TAKE
I will START or STAY ON the medications listed below when I get home from the hospital:    Dilaudid 1 mg/mL oral liquid  -- 2.5 milliliter(s) by mouth every 6 hours ATC for pain due to neoplasm MDD:10 mL daily  -- Caution federal law prohibits the transfer of this drug to any person other  than the person for whom it was prescribed.  May cause drowsiness.  Alcohol may intensify this effect.  Use care when operating dangerous machinery.  This prescription cannot be refilled.  Using more of this medication than prescribed may cause serious breathing problems.    -- Indication: For Pain due to neoplasm    HYDROmorphone 1 mg/mL oral liquid  -- 2.5 milliliter(s) by mouth every 2 hours, As Needed for breakthrough pain MDD:30 mL daily  -- Caution federal law prohibits the transfer of this drug to any person other  than the person for whom it was prescribed.  May cause drowsiness.  Alcohol may intensify this effect.  Use care when operating dangerous machinery.  This prescription cannot be refilled.  Using more of this medication than prescribed may cause serious breathing problems.    -- Indication: For Pain due to neoplasm    Ativan 0.5 mg oral tablet  -- 0.5 tab(s) by mouth every 12 hours, As Needed -for anxiety MDD:1 mg  -- Caution federal law prohibits the transfer of this drug to any person other  than the person for whom it was prescribed.  Do not take this drug if you are pregnant.  May cause drowsiness.  Alcohol may intensify this effect.  Use care when operating dangerous machinery.    -- Indication: For Anxiety    insulin glargine  -- 5 unit(s) subcutaneous once a day  -- Indication: For Diabetes    Prandin 0.5 mg oral tablet  -- 1 tab(s) by mouth 3 times a day (before meals) Hold if you are not eating   -- Do not drink alcoholic beverages when taking this medication.  It is very important that you take or use this exactly as directed.  Do not skip doses or discontinue unless directed by your doctor.  Obtain medical advice before taking any non-prescription drugs as some may affect the action of this medication.    -- Indication: For Diabetes    naloxone 4 mg/0.1 mL nasal spray  -- 4 milligram(s) intranasally x 1. May repeat dose q2-3 min until pt responsive or EMS arrives   -- For the nose.    -- Indication: For Preventive measure    dronabinol 2.5 mg oral capsule  -- 1 cap(s) by mouth once a day MDD:2.5 mg  -- Indication: For Appetite stimulant    Zofran 8 mg oral tablet  -- 1 tab(s) by mouth 3 times a day, As Needed  -- Indication: For Nausea/Vomiting    MiraLax oral powder for reconstitution  -- orally once a day, As Needed - for constipation  -- Indication: For Constipation, unspecified constipation type    docusate sodium 100 mg oral capsule  -- 1 cap(s) by mouth 2 times a day  -- Indication: For Constipation, unspecified constipation type    bisacodyl 10 mg rectal suppository  -- 1 suppository(ies) rectally once a day, As needed, Constipation  -- Indication: For Constipation, unspecified constipation type    senna oral tablet  -- 2 tab(s) by mouth once a day (at bedtime)  -- Indication: For Constipation, unspecified constipation type    Multiple Vitamins oral tablet  -- 1 tab(s) by mouth once a day  -- Indication: For Preventive measure    Vitamin C 500 mg oral tablet  -- 1 tab(s) by mouth once a day   -- Indication: For Preventive measure

## 2018-06-21 NOTE — DISCHARGE NOTE ADULT - SECONDARY DIAGNOSIS.
UTI (urinary tract infection) Type 2 diabetes mellitus with hyperglycemia, with long-term current use of insulin

## 2018-06-21 NOTE — PROGRESS NOTE ADULT - PROBLEM SELECTOR PLAN 1
Due to AMS and liver impairment will use Dilaudid for pain management  Patient is tolerating po medications. Continue:  - Dilaudid 1 mg po Q6H ATC   - Dilaudid 1 mg po Q2H PRN for breakthrough pain  Above regimen discussed with pharmacy. Pills can be broken in half to make 1 mg po dose.

## 2018-06-21 NOTE — CHART NOTE - NSCHARTNOTEFT_GEN_A_CORE
NUTRITION FOLLOW UP NOTE   Pt seen for length of stay.       67 y/o male with metastatic cancer presents from [facility] with generalized weakness found to have sepsis in likely setting of UTI vs tumor fevers; CT negative for PNA.   Daughter signed consents for hospice, Pt likely to be discharged home tomorrow.      Source: Patient [ ]    Family [ ]     other [x] comprehensive chart review     Diet : Consistent Carbohydrate (with evening snacks), [id number], SOFT, 2 Glucerna shakes daily      Patient reports [ ] nausea  [ ] vomiting [ ] diarrhea [ ] constipation  [ ]chewing problems [ ] swallowing issues  [ ] other:   + BM yesterday per nursing documentation.     PO intake: 50%     Source for PO intake [ ] Patient [ ] family [z] chart [ ] staff [ ] other      Weight: 6/14: 114.6lbs, 6/20: 115.5lbs    Pertinent Medications: MEDICATIONS  (STANDING):  ascorbic acid 500 milliGRAM(s) Oral daily  dextrose 5%. 1000 milliLiter(s) (50 mL/Hr) IV Continuous <Continuous>  dextrose 50% Injectable 12.5 Gram(s) IV Push once  dextrose 50% Injectable 25 Gram(s) IV Push once  dextrose 50% Injectable 25 Gram(s) IV Push once  docusate sodium 100 milliGRAM(s) Oral three times a day  dronabinol 2.5 milliGRAM(s) Oral daily  enoxaparin Injectable 40 milliGRAM(s) SubCutaneous daily  HYDROmorphone   Tablet 1 milliGRAM(s) Oral every 6 hours  insulin glargine Injectable (LANTUS) 5 Unit(s) SubCutaneous every morning  insulin lispro (HumaLOG) corrective regimen sliding scale   SubCutaneous three times a day before meals  insulin lispro (HumaLOG) corrective regimen sliding scale   SubCutaneous at bedtime  insulin lispro Injectable (HumaLOG) 2 Unit(s) SubCutaneous before lunch  insulin lispro Injectable (HumaLOG) 1 Unit(s) SubCutaneous before dinner  insulin lispro Injectable (HumaLOG) 3 Unit(s) SubCutaneous before breakfast  lactated ringers. 1000 milliLiter(s) (100 mL/Hr) IV Continuous <Continuous>  multivitamin 1 Tablet(s) Oral daily  ondansetron Injectable 8 milliGRAM(s) IV Push every 8 hours  polyethylene glycol 3350 17 Gram(s) Oral daily  senna 3 Tablet(s) Oral at bedtime    MEDICATIONS  (PRN):  acetaminophen   Tablet 650 milliGRAM(s) Oral every 6 hours PRN For Temp greater than 38 C (100.4 F)  bisacodyl Suppository 10 milliGRAM(s) Rectal daily PRN Constipation  dextrose 40% Gel 15 Gram(s) Oral once PRN Blood Glucose LESS THAN 70 milliGRAM(s)/deciliter  glucagon  Injectable 1 milliGRAM(s) IntraMuscular once PRN Glucose LESS THAN 70 milligrams/deciliter  HYDROmorphone   Tablet 1 milliGRAM(s) Oral every 2 hours PRN Breakthrough pain  LORazepam   Injectable 0.2 milliGRAM(s) IV Push every 12 hours PRN Anxiety  naloxone Injectable 0.1 milliGRAM(s) IV Push every 3 minutes PRN sedation or respiratory depression due to opioids    Pertinent Labs:  06-21 Na133 mmol/L<L> Glu 132 mg/dL<H> K+ 3.9 mmol/L Cr  0.40 mg/dL<L> BUN 9 mg/dL 06-21 Phos 3.2 mg/dL 06-18 Alb 2.0 g/dL<L> 06-07 [id number] 8.2 %<H> 06-09 Chol 94 mg/dL LDL 46 mg/dL HDL 17 mg/dL<L> Trig 156 mg/dL<H>      Skin: stage 2 pressure ulcer on sacrum. no edema noted.    Estimated Needs:   [x] no change since previous assessment  [ ] recalculated:       Previous Nutrition Diagnosis:     [ ] Inadequate Energy Intake [ ]Inadequate Oral Intake [ ] Excessive Energy Intake     [ ] Underweight [ ] Increased Nutrient Needs [ ] Overweight/Obesity     [ ] Altered GI Function [ ] Unintended Weight Loss [ ] Food & Nutrition Related Knowledge Deficit [x] Malnutrition (severe)         Nutrition Diagnosis is [x] ongoing  [ ] resolved [ ] not applicable          New Nutrition Diagnosis: [x] not applicable       Interventions:     Recommend    Continue to provide Glucerna 2x daily (Provides 570kcal, 28.4g protein)   Continue to encourage good po intake at meals   Continue to provide food preferences within diet restrictions as feasible      Monitoring and Evaluation:     [x] PO intake [x] Tolerance to diet prescription [x] weights [x] follow up per protocol    [x] other: Monitor blood sugars/fingersticks     RD to remain available for further nutritional interventions as indicated/requested by medical team/pt.   Naseem Vail, RD, CDN, CDE. Pager: [phone] NUTRITION FOLLOW UP NOTE   Pt seen for length of stay.       67 y/o male with metastatic cancer presents from [facility] with generalized weakness found to have sepsis in likely setting of UTI vs tumor fevers; CT negative for PNA.   Daughter signed consents for hospice, Pt likely to be discharged home tomorrow.      Source: Patient [x]    Family [ ]     other [x] comprehensive chart review, RN  Pt is primarily English speaking but able to make needs known in English and declined  phone.    Diet : Consistent Carbohydrate (with evening snacks), [id number], SOFT, 2 Glucerna shakes daily      Patient reports [ ] nausea  [ ] vomiting [ ] diarrhea [ ] constipation  [ ]chewing problems [ ] swallowing issues  [x] other: Denies GI distress.  + BM yesterday per nursing documentation.     PO intake: 50%  noted a sandwich untouched at bedside. Per RN family brought in outside food which Pt ate ~50% of.  Pt not really drinking glucerna shakes, noted multiple at bedside.     Source for PO intake [ ] Patient [ ] family [x] chart [x] staff [ ] other      Weight: 6/14: 114.6lbs, 6/20: 115.5lbs    Pertinent Medications: MEDICATIONS  (STANDING):  ascorbic acid 500 milliGRAM(s) Oral daily  dextrose 5%. 1000 milliLiter(s) (50 mL/Hr) IV Continuous <Continuous>  dextrose 50% Injectable 12.5 Gram(s) IV Push once  dextrose 50% Injectable 25 Gram(s) IV Push once  dextrose 50% Injectable 25 Gram(s) IV Push once  docusate sodium 100 milliGRAM(s) Oral three times a day  dronabinol 2.5 milliGRAM(s) Oral daily  enoxaparin Injectable 40 milliGRAM(s) SubCutaneous daily  HYDROmorphone   Tablet 1 milliGRAM(s) Oral every 6 hours  insulin glargine Injectable (LANTUS) 5 Unit(s) SubCutaneous every morning  insulin lispro (HumaLOG) corrective regimen sliding scale   SubCutaneous three times a day before meals  insulin lispro (HumaLOG) corrective regimen sliding scale   SubCutaneous at bedtime  insulin lispro Injectable (HumaLOG) 2 Unit(s) SubCutaneous before lunch  insulin lispro Injectable (HumaLOG) 1 Unit(s) SubCutaneous before dinner  insulin lispro Injectable (HumaLOG) 3 Unit(s) SubCutaneous before breakfast  lactated ringers. 1000 milliLiter(s) (100 mL/Hr) IV Continuous <Continuous>  multivitamin 1 Tablet(s) Oral daily  ondansetron Injectable 8 milliGRAM(s) IV Push every 8 hours  polyethylene glycol 3350 17 Gram(s) Oral daily  senna 3 Tablet(s) Oral at bedtime    MEDICATIONS  (PRN):  acetaminophen   Tablet 650 milliGRAM(s) Oral every 6 hours PRN For Temp greater than 38 C (100.4 F)  bisacodyl Suppository 10 milliGRAM(s) Rectal daily PRN Constipation  dextrose 40% Gel 15 Gram(s) Oral once PRN Blood Glucose LESS THAN 70 milliGRAM(s)/deciliter  glucagon  Injectable 1 milliGRAM(s) IntraMuscular once PRN Glucose LESS THAN 70 milligrams/deciliter  HYDROmorphone   Tablet 1 milliGRAM(s) Oral every 2 hours PRN Breakthrough pain  LORazepam   Injectable 0.2 milliGRAM(s) IV Push every 12 hours PRN Anxiety  naloxone Injectable 0.1 milliGRAM(s) IV Push every 3 minutes PRN sedation or respiratory depression due to opioids    Pertinent Labs:  06-21 Na133 mmol/L<L> Glu 132 mg/dL<H> K+ 3.9 mmol/L Cr  0.40 mg/dL<L> BUN 9 mg/dL 06-21 Phos 3.2 mg/dL 06-18 Alb 2.0 g/dL<L> 06-07 [id number] 8.2 %<H> 06-09 Chol 94 mg/dL LDL 46 mg/dL HDL 17 mg/dL<L> Trig 156 mg/dL<H>      Skin: stage 2 pressure ulcer on sacrum. no edema noted.    Estimated Needs:   [x] no change since previous assessment  [ ] recalculated:       Previous Nutrition Diagnosis: [x] Malnutrition (severe)         Nutrition Diagnosis is [x] ongoing  [ ] resolved [ ] not applicable          New Nutrition Diagnosis: [x] not applicable       Interventions:     Recommend  Encourage consumption of Glucerna 2x daily (Provides ~570kcal, 28.4g protein)   Continue to encourage good po intake at meals   Continue to provide food preferences within diet restrictions as feasible      Monitoring and Evaluation:     [x] PO intake [x] Tolerance to diet prescription [x] weights [x] follow up per protocol    [x] other: Monitor blood sugars/fingersticks     RD to remain available for further nutritional interventions as indicated/requested by medical team/pt.   Naseem Vail, MELONIE, CDN, CDE. Pager: [phone]

## 2018-06-21 NOTE — PROGRESS NOTE ADULT - SUBJECTIVE AND OBJECTIVE BOX
Patient is a 68y old  Male who presents with a chief complaint of generalized weakness (14 Jun 2018 19:35)      Overnight Events:  No acute events ON. Pt endorses his pain is well-controlled on PO dilaudid regimen.     REVIEW OF SYSTEMS:  CONSTITUTIONAL: No weakness, fevers or chills  EYES/ENT: No visual changes, no throat pain   RESPIRATORY: No cough, wheezing, hemoptysis; No shortness of breath  CARDIOVASCULAR: No chest pain or palpitations  GASTROINTESTINAL: No abdominal, nausea, vomiting, or hematemesis; No diarrhea or constipation. No melena or hematochezia.  GENITOURINARY: No dysuria, frequency or hematuria  NEUROLOGICAL: No dizziness, numbness, or weakness  SKIN: No itching, burning, rashes, or lesions   All other review of systems is negative unless indicated above.    VITAL SIGNS:  Vital Signs Last 24 Hrs  T(C): 37 (21 Jun 2018 06:23), Max: 37.1 (20 Jun 2018 21:18)  T(F): 98.6 (21 Jun 2018 06:23), Max: 98.8 (20 Jun 2018 21:18)  HR: 107 (21 Jun 2018 06:23) (107 - 116)  BP: 111/64 (21 Jun 2018 06:23) (111/64 - 135/77)  BP(mean): --  RR: 18 (21 Jun 2018 06:23) (18 - 18)  SpO2: 94% (21 Jun 2018 06:23) (93% - 96%)    PHYSICAL EXAM:   GENERAL: no acute distress, cachectic male  HEENT: NC/AT, EOMI, neck supple, MMM  RESPIRATORY: LCTAB/L, no rhonchi, rales, or wheezing  CARDIOVASCULAR: RRR, no murmurs, gallops, rubs  ABDOMINAL: soft, non-tender, non-distended, positive bowel sounds   EXTREMITIES: no clubbing, cyanosis, or edema  NEUROLOGICAL: alert and oriented x 3, non-focal  SKIN: no rashes or lesions   MUSCULOSKELETAL: no gross joint deformity                          8.8    8.1   )-----------( 197      ( 21 Jun 2018 09:05 )             26.3     06-21    133<L>  |  94<L>  |  9   ----------------------------<  132<H>  3.9   |  28  |  0.40<L>    Ca    7.7<L>      21 Jun 2018 09:05  Phos  3.2     06-21  Mg     1.8     06-21          CAPILLARY BLOOD GLUCOSE      POCT Blood Glucose.: 185 mg/dL (21 Jun 2018 08:45)  POCT Blood Glucose.: 155 mg/dL (20 Jun 2018 21:56)  POCT Blood Glucose.: 177 mg/dL (20 Jun 2018 17:22)  POCT Blood Glucose.: 274 mg/dL (20 Jun 2018 13:14)    I&O's Summary    20 Jun 2018 07:01  -  21 Jun 2018 07:00  --------------------------------------------------------  IN: 650 mL / OUT: 350 mL / NET: 300 mL        MEDICATIONS  (STANDING):  ascorbic acid 500 milliGRAM(s) Oral daily  dextrose 5%. 1000 milliLiter(s) (50 mL/Hr) IV Continuous <Continuous>  dextrose 50% Injectable 12.5 Gram(s) IV Push once  dextrose 50% Injectable 25 Gram(s) IV Push once  dextrose 50% Injectable 25 Gram(s) IV Push once  docusate sodium 100 milliGRAM(s) Oral three times a day  dronabinol 2.5 milliGRAM(s) Oral daily  enoxaparin Injectable 40 milliGRAM(s) SubCutaneous daily  HYDROmorphone   Tablet 1 milliGRAM(s) Oral every 6 hours  insulin glargine Injectable (LANTUS) 5 Unit(s) SubCutaneous every morning  insulin lispro (HumaLOG) corrective regimen sliding scale   SubCutaneous three times a day before meals  insulin lispro (HumaLOG) corrective regimen sliding scale   SubCutaneous at bedtime  insulin lispro Injectable (HumaLOG) 2 Unit(s) SubCutaneous before lunch  insulin lispro Injectable (HumaLOG) 1 Unit(s) SubCutaneous before dinner  insulin lispro Injectable (HumaLOG) 3 Unit(s) SubCutaneous before breakfast  lactated ringers. 1000 milliLiter(s) (100 mL/Hr) IV Continuous <Continuous>  multivitamin 1 Tablet(s) Oral daily  ondansetron Injectable 8 milliGRAM(s) IV Push every 8 hours  polyethylene glycol 3350 17 Gram(s) Oral daily  senna 3 Tablet(s) Oral at bedtime

## 2018-06-21 NOTE — DISCHARGE NOTE ADULT - MEDICATION SUMMARY - MEDICATIONS TO CHANGE
I will SWITCH the dose or number of times a day I take the medications listed below when I get home from the hospital:    insulin glargine  -- 8 unit(s) subcutaneous once a day at 10 am

## 2018-06-21 NOTE — PROGRESS NOTE ADULT - PROBLEM SELECTOR PLAN 5
DNR/DNI   HCP- daughter Yari was met at bedside  Hospice consents signed  Likely dispo tomorrow DNR/DNI   HCP- daughter Yari was met at bedside. HCP form in the chart.   Hospice consents signed  Likely dispo tomorrow

## 2018-06-21 NOTE — DISCHARGE NOTE ADULT - HOSPITAL COURSE
HPI:  68M PMH metastatic neuroendocrine carcinoma (unknown primary, diagnosed via iliac bone biopsy, w/ pulmonary mets, mediastinal/hilar lymphadenopathy, hepatic mets, diffuse bone mets, s/p chemo/RT w/ progression of disease), DM2 (recent admission for DKA), HTN presents from [facility] with generalized weakness.  Patient had recent admission 6/6- 6/12 for DKA, was on Lantus and Invokana.  Invokana was d/c'd, and he was discharged on Lantus and Prandin per endocrine.  Patient was also treated for a UTI w/ ceftriaxone.  He was at [facility] today and daughter c/o decreased appetite, abdominal pain.  Daughter reports he has been intermittently confused but that today he is less confused.  She reports dry cough for the past 2 days since leaving the hospital.  Denies wheezing, fevers, chills, shortness of breath, diarrhea, nausea, vomiting.  He did not take his lantus this am because he was going to the doctor, usually takes lantus 8 units, prandin 0.5 mg TID.  She denies hypoglycemia, has been checking his blood sugars and they have been 90s-100s, had one reading of 600 yesterday after eating a sandwitch so gave extra lantus.      In regards to his neuroendocrine tumor, he was diagnosed by bone biopsy November 2017, was treated with carboplatin/etoposide until March 2018 when he was discovered to have POD.  He is planning on starting immunotherapy.    Hospital Course:   P was febrile in ED and had UA that was grossly positive w/ + LE, 26-50 WBCs, moderate ketones. +yeast. Patient met sepsis criteria upon admission. Given his cough, he was treated empirically with Vanco/ Cefepime as there was initially concern for CAP. CT chest was obtained the following day, which did not demonstrate evidence of pneumonia. Thus, vanco was d/jamin and patient was maintained on cefepime to cover UTI. ID was consulted. Due to concern for alk phos that was significantly elevated from baseline, CT A/P was obtained in order to evaluate for possible biliary stasis causing cholangitis. Biliary system and gall bladder were found to be unremarkable on CT. Urine culture grew yeast, but no bacteria. Given lack of definitive infectious source, patient's fevers were believed to be tumor fevers in setting of high tumor burden from metastatic disease.     Endocrinology was consulted for management of pt's T2DM; insulin was titrated as per endo.     Patient's pain during hospitalization was well controlled on IV dilaudid. An attempt was made to transition pt to PO morphine solution, but pt's pain was poorly controlled on this regimen. Patient was eventually transitioned to pain regimen with PO dilaudid; his pain was well managed on this regimen.     Pt was made DNR/DNI during admission. Meeting took place involving Dr. Dick of oncology and palliative. Given that pt was informed during meeting that he was no longer candidate for future chemo or immunotherapy, patient and family made decision for pt to be discharged home with home hospice. HPI:  68M PMH metastatic neuroendocrine carcinoma (unknown primary, diagnosed via iliac bone biopsy, w/ pulmonary mets, mediastinal/hilar lymphadenopathy, hepatic mets, diffuse bone mets, s/p chemo/RT w/ progression of disease), DM2 (recent admission for DKA), HTN presents from [facility] with generalized weakness.  Patient had recent admission 6/6- 6/12 for DKA, was on Lantus and Invokana.  Invokana was d/c'd, and he was discharged on Lantus and Prandin per endocrine.  Patient was also treated for a UTI w/ ceftriaxone.  He was at [facility] today and daughter c/o decreased appetite, abdominal pain.  Daughter reports he has been intermittently confused but that today he is less confused.  She reports dry cough for the past 2 days since leaving the hospital.  Denies wheezing, fevers, chills, shortness of breath, diarrhea, nausea, vomiting.  He did not take his lantus this am because he was going to the doctor, usually takes lantus 8 units, prandin 0.5 mg TID.  She denies hypoglycemia, has been checking his blood sugars and they have been 90s-100s, had one reading of 600 yesterday after eating a sandwitch so gave extra lantus.      In regards to his neuroendocrine tumor, he was diagnosed by bone biopsy November 2017, was treated with carboplatin/etoposide until March 2018 when he was discovered to have POD.  He is planning on starting immunotherapy.    Hospital Course:   P was febrile in ED and had UA that was grossly positive w/ + LE, 26-50 WBCs, moderate ketones. +yeast. Patient met sepsis criteria upon admission. Given his cough, he was treated empirically with Vanco/ Cefepime as there was initially concern for CAP. CT chest was obtained the following day, which did not demonstrate evidence of pneumonia. Thus, vanco was d/jamin and patient was maintained on cefepime to cover UTI. ID was consulted. Due to concern for alk phos that was significantly elevated from baseline, CT A/P was obtained in order to evaluate for possible biliary stasis causing cholangitis. Biliary system and gall bladder were found to be unremarkable on CT. Urine culture grew yeast, but no bacteria. Given lack of definitive infectious source, patient's fevers were believed to be tumor fevers in setting of high tumor burden from metastatic disease.     Endocrinology was consulted for management of pt's T2DM; insulin was titrated as per endo. Pt was d/jamin home on prandin and lantus.     Patient's pain during hospitalization was well controlled on IV dilaudid. An attempt was made to transition pt to PO morphine solution, but pt's pain was poorly controlled on this regimen. Patient was eventually transitioned to pain regimen with PO dilaudid; his pain was well managed on this regimen.     Pt was made DNR/DNI during admission. Meeting took place involving Dr. Dick of oncology and palliative. Given that pt was informed during meeting that he was no longer candidate for future chemo or immunotherapy, patient and family made decision for pt to be discharged home with home hospice.

## 2018-06-21 NOTE — PROGRESS NOTE ADULT - ASSESSMENT
68M PMH metastatic neuroendocrine carcinoma (unknown primary, diagnosed via iliac bone biopsy, w/ pulmonary mets, mediastinal/hilar lymphadenopathy, hepatic mets, diffuse bone mets, s/p chemo/RT w/ progression of disease), DM2 (recent admission for DKA), HTN presents from [facility] with generalized weakness found to have sepsis in likely setting of UTI vs tumor fevers; CT negative for PNA.

## 2018-06-21 NOTE — DISCHARGE NOTE ADULT - PATIENT PORTAL LINK FT
You can access the [facility] Patient Portal, offered by [facility], by registering with the following website: [email or web address]

## 2018-06-21 NOTE — DISCHARGE NOTE ADULT - MEDICATION SUMMARY - MEDICATIONS TO STOP TAKING
I will STOP taking the medications listed below when I get home from the hospital:    Reglan 10 mg oral tablet  -- 1 tab(s) by mouth 4 times a day (before meals and at bedtime), As Needed    morphine 15 mg/12 hr oral tablet, extended release  -- 1 tab(s) by mouth every 12 hours MDD:2 tabs  -- Caution federal law prohibits the transfer of this drug to any person other  than the person for whom it was prescribed.  It is very important that you take or use this exactly as directed.  Do not skip doses or discontinue unless directed by your doctor.  May cause drowsiness.  Alcohol may intensify this effect.  Use care when operating dangerous machinery.  Swallow whole.  Do not crush.  This prescription cannot be refilled.  Using more of this medication than prescribed may cause serious breathing problems.    oxyCODONE 5 mg oral tablet  -- 1 tab(s) by mouth every 4 hours, As Needed MDD:6 tabs  -- Caution federal law prohibits the transfer of this drug to any person other  than the person for whom it was prescribed.  It is very important that you take or use this exactly as directed.  Do not skip doses or discontinue unless directed by your doctor.  May cause drowsiness.  Alcohol may intensify this effect.  Use care when operating dangerous machinery.  This prescription cannot be refilled.  Using more of this medication than prescribed may cause serious breathing problems.    ALPRAZolam 0.5 mg oral tablet  -- 1 tab(s) by mouth every 12 hours x 5 days, As Needed MDD:2 tabs

## 2018-06-21 NOTE — PROGRESS NOTE ADULT - PROBLEM SELECTOR PLAN 2
- + UA,  urine cx on 6/14 with growth of candida   - s/p 7 day course of cefepime that ended on 6/20  - unclear whether pt truly has UTI given cx negative for bacteria; fevers likely in setting of malignancy

## 2018-06-21 NOTE — PROGRESS NOTE ADULT - SUBJECTIVE AND OBJECTIVE BOX
INTERVAL HPI/OVERNIGHT EVENTS: Patient has fluctuation in mental status, but alert.     Allergies  No Known Allergies  Intolerances    ADVANCE DIRECTIVES:  DNR/DNI  DNR [x]YES  MOLST [ ] YES [x ] NO     MEDICATIONS  (STANDING):  ascorbic acid 500 milliGRAM(s) Oral daily  dextrose 5%. 1000 milliLiter(s) (50 mL/Hr) IV Continuous <Continuous>  dextrose 50% Injectable 12.5 Gram(s) IV Push once  dextrose 50% Injectable 25 Gram(s) IV Push once  dextrose 50% Injectable 25 Gram(s) IV Push once  docusate sodium 100 milliGRAM(s) Oral three times a day  dronabinol 2.5 milliGRAM(s) Oral daily  enoxaparin Injectable 40 milliGRAM(s) SubCutaneous daily  HYDROmorphone   Tablet 1 milliGRAM(s) Oral every 6 hours  insulin glargine Injectable (LANTUS) 5 Unit(s) SubCutaneous every morning  insulin lispro (HumaLOG) corrective regimen sliding scale   SubCutaneous three times a day before meals  insulin lispro (HumaLOG) corrective regimen sliding scale   SubCutaneous at bedtime  insulin lispro Injectable (HumaLOG) 2 Unit(s) SubCutaneous before lunch  insulin lispro Injectable (HumaLOG) 1 Unit(s) SubCutaneous before dinner  insulin lispro Injectable (HumaLOG) 3 Unit(s) SubCutaneous before breakfast  lactated ringers. 1000 milliLiter(s) (100 mL/Hr) IV Continuous <Continuous>  multivitamin 1 Tablet(s) Oral daily  ondansetron Injectable 8 milliGRAM(s) IV Push every 8 hours  polyethylene glycol 3350 17 Gram(s) Oral daily  senna 3 Tablet(s) Oral at bedtime    MEDICATIONS  (PRN):  acetaminophen   Tablet 650 milliGRAM(s) Oral every 6 hours PRN For Temp greater than 38 C (100.4 F)  bisacodyl Suppository 10 milliGRAM(s) Rectal daily PRN Constipation  dextrose 40% Gel 15 Gram(s) Oral once PRN Blood Glucose LESS THAN 70 milliGRAM(s)/deciliter  glucagon  Injectable 1 milliGRAM(s) IntraMuscular once PRN Glucose LESS THAN 70 milligrams/deciliter  HYDROmorphone   Tablet 1 milliGRAM(s) Oral every 2 hours PRN Breakthrough pain  LORazepam   Injectable 0.2 milliGRAM(s) IV Push every 12 hours PRN Anxiety  naloxone Injectable 0.1 milliGRAM(s) IV Push every 3 minutes PRN sedation or respiratory depression due to opioids      PRESENT SYMPTOMS:  Source: [x ] Patient   [x ] Family   [ ] Team     Pain:                        [ ] No [ x] Yes             [x ] Mild [ ] Moderate [ ] Severe       Onset - on deep palpation   Location - RLQ  Duration - intermittent  Character - sharp  Alleviating/Aggravating - aggravated or apparent w/ palpation  Radiation -   Timing -    Dyspnea:                [x] No [ ] Yes             [ ] Mild [ ] Moderate [ ] Severe    Anxiety:                  [x ] No [ ] Yes             [ ] Mild [ ] Moderate [ ] Severe    Fatigue:                  [ ] No [x] Yes             [x ] Mild [ ] Moderate [ ] Severe    Nausea:                  [  ] No [x] Yes             [ x] Mild [ ] Moderate [ ] Severe    Loss of appetite:   [ ] No [x] Yes             [x ] Mild [ ] Moderate [ ] Severe    Constipation:        [x] No [ ] Yes             [ ] Mild [ ] Moderate [ ] Severe    Other Symptoms:  [x ] All other review of systems negative   [ ] Unable to obtain due to poor mentation     Karnofsky Performance Score/Palliative Performance Status Version 2:  30  %    PHYSICAL EXAM:  Vital Signs Last 24 Hrs  T(C): 36.9 (21 Jun 2018 14:38), Max: 37.1 (20 Jun 2018 21:18)  T(F): 98.5 (21 Jun 2018 14:38), Max: 98.8 (20 Jun 2018 21:18)  HR: 115 (21 Jun 2018 14:38) (107 - 115)  BP: 127/81 (21 Jun 2018 14:38) (111/64 - 135/77)  BP(mean): --  RR: 18 (21 Jun 2018 14:38) (18 - 18)  SpO2: 97% (21 Jun 2018 14:38) (93% - 97%)    General:  [x ] Alert  [x ] Oriented x  2    [ ] Lethargic  [ ] Agitated   [ ] Cachexia   [ ] Unarousable  [x ] Verbal  [ ] Non-Verbal [x] Ill appearing male in distress due to pain on movement.     HEENT:  [x ] Normal   [ ] Dry mouth   [ ] ET Tube    [ ] Trach  [ ] Oral lesions    Lungs:   [x ] Clear [ ] Tachypnea  [ ] Audible excessive secretions   [ ] Rhonchi        [ ] Right [ ] Left [ ] Bilateral  [ ] Crackles        [ ] Right [ ] Left [ ] Bilateral  [ ] Wheezing     [ ] Right [ ] Left [ ] Bilateral    Cardiovascular:  [x ] Regular [ ] Irregular [ ] Tachycardia   [ ] Bradycardia  [ ] Murmur [ ] Other    Abdomen: [ x] Soft  [ ] Distended   [ ] +BS  [ ] Non tender [ ] Tender  [ ]PEG   [ ]OGT/ NGT   Last BM: 6/20    Genitourinary: [ ] Normal [x ] Incontinent   [ ] Oliguria/Anuria   [ ] Moore    Musculoskeletal:  [ ] Normal   [ ] Weakness  [ x] Bedbound/Wheelchair bound [ ] Edema    Neurological: [ ] No focal deficits  [ ] Cognitive impairment  [ ] Dysphagia [ ] Dysarthria [ ] Paresis [x ] Other: Lethargic     Skin: [ ] Normal   [x ] Pressure ulcer(s)                  [ ] Rash    LABS:                        8.8    8.1   )-----------( 197      ( 21 Jun 2018 09:05 )             26.3     06-21    133<L>  |  94<L>  |  9   ----------------------------<  132<H>  3.9   |  28  |  0.40<L>    Ca    7.7<L>      21 Jun 2018 09:05  Phos  3.2     06-21  Mg     1.8     06-21      Shock: [ ] Septic [ ] Cardiogenic [ ] Neurologic [ ] Hypovolemic  Vasopressors x   Inotrophs x     Oral Intake: [ ] Unable/mouth care only [x ] Minimal [ ] Moderate [ ] Full Capability    Diet: [ ] NPO [ ] Tube feeds [ ] TPN [ ] Other     RADIOLOGY & ADDITIONAL STUDIES:  EXAM:  CT ABDOMEN AND PELVIS IC                            PROCEDURE DATE:  06/18/2018    INTERPRETATION:  CLINICAL INFORMATION: Metastatic neuroendocrine tumor.   Elevated alkaline phosphatase and sepsis. Concern for cholangitis.     COMPARISON:   1.  CT Chest on 6/16/2018  2.  CT Chest/abdomen/pelvis on 5/30/2018  3.  PET/CT on 4/16/2018  4.  Abdominal MRI on 11/24/2017IMPRESSION:     No biliary ductal dilatation or evidence of cholangitis.    Bilobar hepatic metastatic disease with suggestion of progression   compared with prior study 5/20/2018.    Pulmonary and osseous metastatic disease without significant change.      REFERRALS:   [ ] Chaplaincy  [x ] Hospice  [ ] Child Life  [ ] Social Work  [ ] Case management [ ] Holistic Therapy   Jasvir Camara MD [phone] INTERVAL HPI/OVERNIGHT EVENTS: Patient has fluctuation in mental status, but alert. Today his pain is better controlled. Pain is not evident when he is not putting pressure in his decubitus ulcer. However, when lying on his sacrum his pain appears moderate to severe.  Pain over abdominal regions has subside.     Allergies  No Known Allergies  Intolerances    ADVANCE DIRECTIVES:  DNR/DNI  DNR [x]YES  MOLST [ ] YES [x ] NO     MEDICATIONS  (STANDING):  ascorbic acid 500 milliGRAM(s) Oral daily  dextrose 5%. 1000 milliLiter(s) (50 mL/Hr) IV Continuous <Continuous>  dextrose 50% Injectable 12.5 Gram(s) IV Push once  dextrose 50% Injectable 25 Gram(s) IV Push once  dextrose 50% Injectable 25 Gram(s) IV Push once  docusate sodium 100 milliGRAM(s) Oral three times a day  dronabinol 2.5 milliGRAM(s) Oral daily  enoxaparin Injectable 40 milliGRAM(s) SubCutaneous daily  HYDROmorphone   Tablet 1 milliGRAM(s) Oral every 6 hours  insulin glargine Injectable (LANTUS) 5 Unit(s) SubCutaneous every morning  insulin lispro (HumaLOG) corrective regimen sliding scale   SubCutaneous three times a day before meals  insulin lispro (HumaLOG) corrective regimen sliding scale   SubCutaneous at bedtime  insulin lispro Injectable (HumaLOG) 2 Unit(s) SubCutaneous before lunch  insulin lispro Injectable (HumaLOG) 1 Unit(s) SubCutaneous before dinner  insulin lispro Injectable (HumaLOG) 3 Unit(s) SubCutaneous before breakfast  lactated ringers. 1000 milliLiter(s) (100 mL/Hr) IV Continuous <Continuous>  multivitamin 1 Tablet(s) Oral daily  ondansetron Injectable 8 milliGRAM(s) IV Push every 8 hours  polyethylene glycol 3350 17 Gram(s) Oral daily  senna 3 Tablet(s) Oral at bedtime    MEDICATIONS  (PRN):  acetaminophen   Tablet 650 milliGRAM(s) Oral every 6 hours PRN For Temp greater than 38 C (100.4 F)  bisacodyl Suppository 10 milliGRAM(s) Rectal daily PRN Constipation  dextrose 40% Gel 15 Gram(s) Oral once PRN Blood Glucose LESS THAN 70 milliGRAM(s)/deciliter  glucagon  Injectable 1 milliGRAM(s) IntraMuscular once PRN Glucose LESS THAN 70 milligrams/deciliter  HYDROmorphone   Tablet 1 milliGRAM(s) Oral every 2 hours PRN Breakthrough pain  LORazepam   Injectable 0.2 milliGRAM(s) IV Push every 12 hours PRN Anxiety  naloxone Injectable 0.1 milliGRAM(s) IV Push every 3 minutes PRN sedation or respiratory depression due to opioids      PRESENT SYMPTOMS:  Source: [x ] Patient   [x ] Family   [ ] Team     Pain:                        [ ] No [ x] Yes             [ ] Mild [x ] Moderate to Severe only when applying pressure on his sacrum.       Onset - on deep palpation   Location - RLQ  Duration - intermittent  Character - sharp  Alleviating/Aggravating - aggravated or apparent w/ palpation  Radiation -   Timing -    Dyspnea:                [x] No [ ] Yes             [ ] Mild [ ] Moderate [ ] Severe    Anxiety:                  [x ] No [ ] Yes             [ ] Mild [ ] Moderate [ ] Severe    Fatigue:                  [ ] No [x] Yes             [x ] Mild [ ] Moderate [ ] Severe    Nausea:                  [  ] No [x] Yes             [ x] Mild [ ] Moderate [ ] Severe    Loss of appetite:   [ ] No [x] Yes             [x ] Mild [ ] Moderate [ ] Severe    Constipation:        [x] No [ ] Yes             [ ] Mild [ ] Moderate [ ] Severe    Other Symptoms:  [x ] All other review of systems negative   [ ] Unable to obtain due to poor mentation     Karnofsky Performance Score/Palliative Performance Status Version 2:  30  %    PHYSICAL EXAM:  Vital Signs Last 24 Hrs  T(C): 36.9 (21 Jun 2018 14:38), Max: 37.1 (20 Jun 2018 21:18)  T(F): 98.5 (21 Jun 2018 14:38), Max: 98.8 (20 Jun 2018 21:18)  HR: 115 (21 Jun 2018 14:38) (107 - 115)  BP: 127/81 (21 Jun 2018 14:38) (111/64 - 135/77)  BP(mean): --  RR: 18 (21 Jun 2018 14:38) (18 - 18)  SpO2: 97% (21 Jun 2018 14:38) (93% - 97%)    General:  [x ] Alert  [x ] Oriented x  2    [ ] Lethargic  [ ] Agitated   [ ] Cachexia   [ ] Unarousable  [x ] Verbal  [ ] Non-Verbal [x] Ill appearing male with episodic acute distress when putting pressure over decubitus ulcer.     HEENT:  [x ] Normal   [ ] Dry mouth   [ ] ET Tube    [ ] Trach  [ ] Oral lesions    Lungs:     [x ] Clear [ ] Tachypnea  [ ] Audible excessive secretions   [ ] Rhonchi        [ ] Right [ ] Left [ ] Bilateral  [ ] Crackles        [ ] Right [ ] Left [ ] Bilateral  [ ] Wheezing     [ ] Right [ ] Left [ ] Bilateral    Cardiovascular:  [x ] Regular [ ] Irregular [ ] Tachycardia   [ ] Bradycardia  [ ] Murmur [ ] Other    Abdomen: [ x] Soft  [ ] Distended   [x ] +BS  [x ] Non tender [ ] Tender  [ ]PEG   [ ]OGT/ NGT   Last BM: 6/20    Genitourinary: [ ] Normal [x ] Incontinent   [ ] Oliguria/Anuria   [ ] Moore    Musculoskeletal:  [ ] Normal   [ ] Weakness  [ x] Bedbound/Wheelchair bound [ ] Edema    Neurological: [ ] No focal deficits  [ ] Cognitive impairment  [ ] Dysphagia [ ] Dysarthria [ ] Paresis [x ] Other: Lethargic     Skin: [ ] Normal   [x ] Pressure ulcer(s) Sacrum                 [ ] Rash    LABS:                        8.8    8.1   )-----------( 197      ( 21 Jun 2018 09:05 )             26.3     06-21    133<L>  |  94<L>  |  9   ----------------------------<  132<H>  3.9   |  28  |  0.40<L>    Ca    7.7<L>      21 Jun 2018 09:05  Phos  3.2     06-21  Mg     1.8     06-21      Shock: [ ] Septic [ ] Cardiogenic [ ] Neurologic [ ] Hypovolemic  Vasopressors x   Inotrophs x     Oral Intake: [ ] Unable/mouth care only [x ] Minimal [ ] Moderate [ ] Full Capability    Diet: [ ] NPO [ ] Tube feeds [ ] TPN [ ] Other     RADIOLOGY & ADDITIONAL STUDIES:  EXAM:  CT ABDOMEN AND PELVIS IC                            PROCEDURE DATE:  06/18/2018    INTERPRETATION:  CLINICAL INFORMATION: Metastatic neuroendocrine tumor.   Elevated alkaline phosphatase and sepsis. Concern for cholangitis.     COMPARISON:   1.  CT Chest on 6/16/2018  2.  CT Chest/abdomen/pelvis on 5/30/2018  3.  PET/CT on 4/16/2018  4.  Abdominal MRI on 11/24/2017IMPRESSION:     No biliary ductal dilatation or evidence of cholangitis.    Bilobar hepatic metastatic disease with suggestion of progression   compared with prior study 5/20/2018.    Pulmonary and osseous metastatic disease without significant change.      REFERRALS:   [ ] Chaplaincy  [x ] Hospice  [ ] Child Life  [ ] Social Work  [ ] Case management [ ] Holistic Therapy   Jasvir Camara MD [phone]

## 2018-06-21 NOTE — PROGRESS NOTE ADULT - PROBLEM SELECTOR PLAN 3
ECOG score 4, KPS 30%  Oncology appreciated:  Patient is not a candidate for further immunotherapy at this time  Patient and Family agrees for symptom management focus and home with hospice ECOG score 4, KPS 30%  Oncology appreciated:  Patient is not a candidate for further immunotherapy at this time  Patient and Family agrees for symptom management focus and home with hospice.   Hospice referral was already done.

## 2018-06-21 NOTE — DISCHARGE NOTE ADULT - CARE PROVIDER_API CALL
Yari Dick (DO), HematologyOncology; Internal Medicine  [address] [place] [address]  Phone: [phone]  Fax: [phone]

## 2018-06-21 NOTE — DISCHARGE NOTE ADULT - CARE PLAN
Principal Discharge DX:	Neuroendocrine cancer  Goal:	Home with hospice  Assessment and plan of treatment:	After a meeting took place with you, palliative care, and Dr. Dick, a decision has been made for you to go home with hospice. Your discharge has been set up with home hospice. Medications to treat your pain and anxiety will be sent home for you.  Secondary Diagnosis:	UTI (urinary tract infection)  Assessment and plan of treatment:	You completed a 7 day course of IV antibiotics for a urinary tract infection. You do not need to continue antibiotics at home as you have been fully treated for this infection.  Secondary Diagnosis:	Type 2 diabetes mellitus with hyperglycemia, with long-term current use of insulin  Assessment and plan of treatment:	Endocrinology saw you in the hospital and recommended . . . Principal Discharge DX:	Neuroendocrine cancer  Goal:	Home with hospice  Assessment and plan of treatment:	After a meeting took place with you, palliative care, and Dr. Dick, a decision has been made for you to go home with hospice. Your discharge has been set up with home hospice. Medications to treat your pain and anxiety will be sent home for you.  Secondary Diagnosis:	UTI (urinary tract infection)  Assessment and plan of treatment:	You completed a 7 day course of IV antibiotics for a urinary tract infection. You do not need to continue antibiotics at home as you have been fully treated for this infection.  Secondary Diagnosis:	Type 2 diabetes mellitus with hyperglycemia, with long-term current use of insulin  Assessment and plan of treatment:	Endocrinology saw you in the hospital and recommended for you to continue on prandin before meals. You will take lantus at a lesser dose of 5 units in the morning.

## 2018-06-21 NOTE — DISCHARGE NOTE ADULT - PLAN OF CARE
Home with hospice After a meeting took place with you, palliative care, and Dr. Dick, a decision has been made for you to go home with hospice. Your discharge has been set up with home hospice. Medications to treat your pain and anxiety will be sent home for you. You completed a 7 day course of IV antibiotics for a urinary tract infection. You do not need to continue antibiotics at home as you have been fully treated for this infection. Endocrinology saw you in the hospital and recommended . . . Endocrinology saw you in the hospital and recommended for you to continue on prandin before meals. You will take lantus at a lesser dose of 5 units in the morning.

## 2018-06-21 NOTE — PROGRESS NOTE ADULT - ASSESSMENT
Patient is a 69 y/o M with significant PMH of metastatic neuroendocrine carcinoma (unknown primary, diagnosed via iliac bone biopsy, w/ pulmonary mets, mediastinal/hilar lymphadenopathy, hepatic mets, diffuse bone mets, s/p chemo/RT w/ progression of disease), DM2 (recent admission for DKA), HTN presented from [facility] with generalized weakness with sepsis 2/2 PNA vs. UTI. Hospitalizations complicated by Delirium.  Palliative care following for pain management and ongoing GOC.

## 2018-06-21 NOTE — GOALS OF CARE CONVERSATION - PERSONAL ADVANCE DIRECTIVE - CONVERSATION DETAILS
Met with daughter Shanel at patients bedside, meeting held and services reviewed, consents signed.  Shanel hopeful of DC tomorrow, DME requested for delivery tomorrow, spouse will be home to accept delivery.  Case Management aware.  Any questions or concerns please call [facility] office at [phone] or [facility] Office at [phone].

## 2018-06-22 VITALS
RESPIRATION RATE: 18 BRPM | SYSTOLIC BLOOD PRESSURE: 116 MMHG | TEMPERATURE: 98 F | DIASTOLIC BLOOD PRESSURE: 68 MMHG | OXYGEN SATURATION: 95 % | HEART RATE: 110 BPM

## 2018-06-22 LAB — GLUCOSE BLDC GLUCOMTR-MCNC: 160 MG/DL — HIGH (ref 70–99)

## 2018-06-22 PROCEDURE — A9585: CPT

## 2018-06-22 PROCEDURE — 87086 URINE CULTURE/COLONY COUNT: CPT

## 2018-06-22 PROCEDURE — 87798 DETECT AGENT NOS DNA AMP: CPT

## 2018-06-22 PROCEDURE — 84484 ASSAY OF TROPONIN QUANT: CPT

## 2018-06-22 PROCEDURE — 83605 ASSAY OF LACTIC ACID: CPT

## 2018-06-22 PROCEDURE — 93005 ELECTROCARDIOGRAM TRACING: CPT

## 2018-06-22 PROCEDURE — 71250 CT THORAX DX C-: CPT

## 2018-06-22 PROCEDURE — 71045 X-RAY EXAM CHEST 1 VIEW: CPT

## 2018-06-22 PROCEDURE — 82140 ASSAY OF AMMONIA: CPT

## 2018-06-22 PROCEDURE — 82010 KETONE BODYS QUAN: CPT

## 2018-06-22 PROCEDURE — 80048 BASIC METABOLIC PNL TOTAL CA: CPT

## 2018-06-22 PROCEDURE — 82947 ASSAY GLUCOSE BLOOD QUANT: CPT

## 2018-06-22 PROCEDURE — 96374 THER/PROPH/DIAG INJ IV PUSH: CPT

## 2018-06-22 PROCEDURE — 82553 CREATINE MB FRACTION: CPT

## 2018-06-22 PROCEDURE — 85014 HEMATOCRIT: CPT

## 2018-06-22 PROCEDURE — 87486 CHLMYD PNEUM DNA AMP PROBE: CPT

## 2018-06-22 PROCEDURE — 84295 ASSAY OF SERUM SODIUM: CPT

## 2018-06-22 PROCEDURE — 82803 BLOOD GASES ANY COMBINATION: CPT

## 2018-06-22 PROCEDURE — 85027 COMPLETE CBC AUTOMATED: CPT

## 2018-06-22 PROCEDURE — 96375 TX/PRO/DX INJ NEW DRUG ADDON: CPT

## 2018-06-22 PROCEDURE — 82570 ASSAY OF URINE CREATININE: CPT

## 2018-06-22 PROCEDURE — 99239 HOSP IP/OBS DSCHRG MGMT >30: CPT

## 2018-06-22 PROCEDURE — 70552 MRI BRAIN STEM W/DYE: CPT

## 2018-06-22 PROCEDURE — 84132 ASSAY OF SERUM POTASSIUM: CPT

## 2018-06-22 PROCEDURE — 82550 ASSAY OF CK (CPK): CPT

## 2018-06-22 PROCEDURE — 83735 ASSAY OF MAGNESIUM: CPT

## 2018-06-22 PROCEDURE — 80202 ASSAY OF VANCOMYCIN: CPT

## 2018-06-22 PROCEDURE — 87581 M.PNEUMON DNA AMP PROBE: CPT

## 2018-06-22 PROCEDURE — 74177 CT ABD & PELVIS W/CONTRAST: CPT

## 2018-06-22 PROCEDURE — 80053 COMPREHEN METABOLIC PANEL: CPT

## 2018-06-22 PROCEDURE — 97161 PT EVAL LOW COMPLEX 20 MIN: CPT

## 2018-06-22 PROCEDURE — 84100 ASSAY OF PHOSPHORUS: CPT

## 2018-06-22 PROCEDURE — 82330 ASSAY OF CALCIUM: CPT

## 2018-06-22 PROCEDURE — 87633 RESP VIRUS 12-25 TARGETS: CPT

## 2018-06-22 PROCEDURE — 82962 GLUCOSE BLOOD TEST: CPT

## 2018-06-22 PROCEDURE — 82435 ASSAY OF BLOOD CHLORIDE: CPT

## 2018-06-22 PROCEDURE — 87040 BLOOD CULTURE FOR BACTERIA: CPT

## 2018-06-22 PROCEDURE — 99285 EMERGENCY DEPT VISIT HI MDM: CPT | Mod: 25

## 2018-06-22 PROCEDURE — 81001 URINALYSIS AUTO W/SCOPE: CPT

## 2018-06-22 PROCEDURE — 83935 ASSAY OF URINE OSMOLALITY: CPT

## 2018-06-22 PROCEDURE — 84300 ASSAY OF URINE SODIUM: CPT

## 2018-06-22 RX ORDER — HYDROMORPHONE HYDROCHLORIDE 2 MG/ML
2.5 INJECTION INTRAMUSCULAR; INTRAVENOUS; SUBCUTANEOUS
Qty: 250 | Refills: 0 | OUTPATIENT
Start: 2018-06-22

## 2018-06-22 RX ORDER — REPAGLINIDE 1 MG/1
1 TABLET ORAL
Qty: 90 | Refills: 0 | OUTPATIENT
Start: 2018-06-22 | End: 2018-07-21

## 2018-06-22 RX ORDER — SENNA PLUS 8.6 MG/1
2 TABLET ORAL
Qty: 60 | Refills: 0 | OUTPATIENT
Start: 2018-06-22 | End: 2018-07-21

## 2018-06-22 RX ORDER — HYDROMORPHONE HYDROCHLORIDE 2 MG/ML
0.5 INJECTION INTRAMUSCULAR; INTRAVENOUS; SUBCUTANEOUS
Qty: 60 | Refills: 0 | OUTPATIENT
Start: 2018-06-22 | End: 2018-07-21

## 2018-06-22 RX ORDER — DRONABINOL 2.5 MG
1 CAPSULE ORAL
Qty: 30 | Refills: 0 | OUTPATIENT
Start: 2018-06-22 | End: 2018-07-21

## 2018-06-22 RX ORDER — NALOXONE HYDROCHLORIDE 4 MG/.1ML
4 SPRAY NASAL
Qty: 4 | Refills: 0 | OUTPATIENT
Start: 2018-06-22

## 2018-06-22 RX ADMIN — HYDROMORPHONE HYDROCHLORIDE 1 MILLIGRAM(S): 2 INJECTION INTRAMUSCULAR; INTRAVENOUS; SUBCUTANEOUS at 10:00

## 2018-06-22 RX ADMIN — Medication 3 UNIT(S): at 09:16

## 2018-06-22 RX ADMIN — HYDROMORPHONE HYDROCHLORIDE 1 MILLIGRAM(S): 2 INJECTION INTRAMUSCULAR; INTRAVENOUS; SUBCUTANEOUS at 00:59

## 2018-06-22 RX ADMIN — INSULIN GLARGINE 5 UNIT(S): 100 INJECTION, SOLUTION SUBCUTANEOUS at 09:17

## 2018-06-22 RX ADMIN — ENOXAPARIN SODIUM 40 MILLIGRAM(S): 100 INJECTION SUBCUTANEOUS at 11:31

## 2018-06-22 RX ADMIN — HYDROMORPHONE HYDROCHLORIDE 1 MILLIGRAM(S): 2 INJECTION INTRAMUSCULAR; INTRAVENOUS; SUBCUTANEOUS at 09:23

## 2018-06-22 RX ADMIN — ONDANSETRON 8 MILLIGRAM(S): 8 TABLET, FILM COATED ORAL at 05:36

## 2018-06-22 RX ADMIN — HYDROMORPHONE HYDROCHLORIDE 1 MILLIGRAM(S): 2 INJECTION INTRAMUSCULAR; INTRAVENOUS; SUBCUTANEOUS at 05:35

## 2018-06-22 RX ADMIN — Medication 2.5 MILLIGRAM(S): at 11:28

## 2018-06-22 RX ADMIN — Medication 1: at 09:16

## 2018-06-22 RX ADMIN — HYDROMORPHONE HYDROCHLORIDE 1 MILLIGRAM(S): 2 INJECTION INTRAMUSCULAR; INTRAVENOUS; SUBCUTANEOUS at 11:28

## 2018-06-22 RX ADMIN — Medication 1 TABLET(S): at 11:28

## 2018-06-22 RX ADMIN — Medication 500 MILLIGRAM(S): at 11:30

## 2018-06-22 RX ADMIN — Medication 100 MILLIGRAM(S): at 05:34

## 2018-06-22 NOTE — PROGRESS NOTE ADULT - PROBLEM SELECTOR PLAN 3
-No evidence of cholangitis on CT  -CT A/P without evidence of cholangitis or cholecystitis, but is demonstrative of worsening hepatic mets

## 2018-06-22 NOTE — PROGRESS NOTE ADULT - PROBLEM SELECTOR PLAN 5
DNR/DNI   HCP- daughter Yari was met at bedside. HCP form in the chart.   Hospice consents signed  Yari was explained how BT Dilaudid is used in between patients standing doses. The nature of his agitation and delirium were also discussed and that it was d/t progression of his malignancy. Patients sister was reassured and her support to patient was recognized. Dispo to home with hospice today.   Palliative to sign off- please reconsult PRN  Likely dispo tomorrow

## 2018-06-22 NOTE — PROGRESS NOTE ADULT - PROVIDER SPECIALTY LIST ADULT
Endocrinology
Heme/Onc
Heme/Onc
Infectious Disease
Infectious Disease
Internal Medicine
Palliative Care
Urology
Internal Medicine
Palliative Care
Internal Medicine

## 2018-06-22 NOTE — PROGRESS NOTE ADULT - SUBJECTIVE AND OBJECTIVE BOX
Patient is a 68y old  Male who presents with a chief complaint of generalized weakness (21 Jun 2018 22:01)      Overnight Events:  No acute events ON.     REVIEW OF SYSTEMS:  CONSTITUTIONAL: No weakness, fevers or chills  EYES/ENT: No visual changes, no throat pain   RESPIRATORY: No cough, wheezing, hemoptysis; No shortness of breath  CARDIOVASCULAR: No chest pain or palpitations  GASTROINTESTINAL: No abdominal, nausea, vomiting, or hematemesis; No diarrhea or constipation. No melena or hematochezia.  GENITOURINARY: No dysuria, frequency or hematuria  NEUROLOGICAL: No dizziness, numbness, or weakness  SKIN: No itching, burning, rashes, or lesions   All other review of systems is negative unless indicated above.    VITAL SIGNS:  Vital Signs Last 24 Hrs  T(C): 36.8 (22 Jun 2018 12:52), Max: 37 (21 Jun 2018 20:58)  T(F): 98.2 (22 Jun 2018 12:52), Max: 98.6 (21 Jun 2018 20:58)  HR: 110 (22 Jun 2018 12:52) (106 - 115)  BP: 116/68 (22 Jun 2018 12:52) (116/68 - 127/81)  BP(mean): --  RR: 18 (22 Jun 2018 12:52) (18 - 18)  SpO2: 95% (22 Jun 2018 12:52) (93% - 97%)    PHYSICAL EXAM:   GENERAL: no acute distress  HEENT: NC/AT, EOMI, neck supple, MMM  RESPIRATORY: LCTAB/L, no rhonchi, rales, or wheezing  CARDIOVASCULAR: RRR, no murmurs, gallops, rubs  ABDOMINAL: mild tenderness to palpation in RUQ and LUQ  EXTREMITIES: no clubbing, cyanosis, or edema  NEUROLOGICAL: alert and oriented x 3, non-focal  SKIN: no rashes or lesions   MUSCULOSKELETAL: no gross joint deformity                          8.8    8.1   )-----------( 197      ( 21 Jun 2018 09:05 )             26.3     06-21    133<L>  |  94<L>  |  9   ----------------------------<  132<H>  3.9   |  28  |  0.40<L>    Ca    7.7<L>      21 Jun 2018 09:05  Phos  3.2     06-21  Mg     1.8     06-21          CAPILLARY BLOOD GLUCOSE      POCT Blood Glucose.: 160 mg/dL (22 Jun 2018 09:08)  POCT Blood Glucose.: 170 mg/dL (21 Jun 2018 21:58)  POCT Blood Glucose.: 177 mg/dL (21 Jun 2018 17:29)    I&O's Summary    21 Jun 2018 07:01  -  22 Jun 2018 07:00  --------------------------------------------------------  IN: 120 mL / OUT: 400 mL / NET: -280 mL        MEDICATIONS  (STANDING):  ascorbic acid 500 milliGRAM(s) Oral daily  dextrose 5%. 1000 milliLiter(s) (50 mL/Hr) IV Continuous <Continuous>  dextrose 50% Injectable 12.5 Gram(s) IV Push once  dextrose 50% Injectable 25 Gram(s) IV Push once  dextrose 50% Injectable 25 Gram(s) IV Push once  docusate sodium 100 milliGRAM(s) Oral three times a day  dronabinol 2.5 milliGRAM(s) Oral daily  enoxaparin Injectable 40 milliGRAM(s) SubCutaneous daily  HYDROmorphone   Tablet 1 milliGRAM(s) Oral every 6 hours  insulin glargine Injectable (LANTUS) 5 Unit(s) SubCutaneous every morning  insulin lispro (HumaLOG) corrective regimen sliding scale   SubCutaneous three times a day before meals  insulin lispro (HumaLOG) corrective regimen sliding scale   SubCutaneous at bedtime  insulin lispro Injectable (HumaLOG) 2 Unit(s) SubCutaneous before lunch  insulin lispro Injectable (HumaLOG) 1 Unit(s) SubCutaneous before dinner  insulin lispro Injectable (HumaLOG) 3 Unit(s) SubCutaneous before breakfast  lactated ringers. 1000 milliLiter(s) (100 mL/Hr) IV Continuous <Continuous>  multivitamin 1 Tablet(s) Oral daily  ondansetron Injectable 8 milliGRAM(s) IV Push every 8 hours  senna 2 Tablet(s) Oral at bedtime

## 2018-06-22 NOTE — PROGRESS NOTE ADULT - PROBLEM SELECTOR PLAN 7
- home regimen: Lantus 8 mg qhs, prandin 0.5 mg TID   - on ISS, humalog tid-ac and lantus qhs  - Endo following
- home regimen: Lantus 8 mg qhs, prandin 0.5 mg TID   - on ISS, humalog tid-ac and lantus qhs  - Endo following; titrating insulin as appropriate
- home regimen: Lantus 8 mg qhs, prandin 0.5 mg TID   - on ISS, humalog tid-ac and lantus qhs  - Endo following; titrating insulin as appropriate
- home regimen: Lantus 8 mg qhs, prandin 0.5 mg TID   - on ISS, humalog tid-ac and lantus qhs  - Endo following
- home regimen: Lantus 8 mg qhs, prandin 0.5 mg TID   - on ISS, humalog tid-ac and lantus qhs  - Endo following; titrating insulin as appropriate
- home regimen: Lantus 8 mg qhs, prandin 0.5 mg TID   - on ISS, humalog tid-ac and lantus qhs  - Endo following; titrating insulin as appropriate
- to be d/jamin on home regimen: Lantus 8 mg qhs, prandin 0.5 mg TID
-Pt with mild DKA on admission with positive BHB and widened AG  -AG now closed, pt euglycemic  -Endo as above

## 2018-06-22 NOTE — PROGRESS NOTE ADULT - PROBLEM SELECTOR PLAN 8
- currently not on medication, bp stable

## 2018-06-22 NOTE — PROGRESS NOTE ADULT - PROBLEM SELECTOR PLAN 6
- stage IV neuroendocrine cancer w/ pulmonary mets, mediastinal / hilar lymphadenopathy, hepatic mets, bone mets  - recent CT chest, A/P demonstrated progression of disease  - Pt to be d/jamin home with home hospice

## 2018-06-22 NOTE — PROGRESS NOTE ADULT - ATTENDING COMMENTS
Valentino Ring  Pager: [phone]. If no response or past 5 pm call [phone].     Will sign off, please call with questions.
Valentino Ring  Pager: [phone]. If no response or past 5 pm call [phone].
Patient seen and examined. Agree with note as above with addendum: concerned that in light of his recent DKA he needs pre-meal insulin on discharge while his pancreas recovers. His daughter is home during the day so she can give it.  Haritha Rebollar MD  Pager: [phone]  Nights and Weekends: [phone]
I have personally seen and examined the patient and completed the note.
Patient with Metastatic Neuroendocrine tumor, cancer pain, and delirium (likely 2/2 to acute infection/pain). Delirium appears to have resolved. Pain is improving on current pain regiment. Will f/u PRN requirements to see if a long acting opioid can be prescribed. GOC discussion is ongoing pending on OC re-eval on Thursday.
Patient with Metastatic Neuroendocrine tumor, cancer pain, and delirium (likely 2/2 to acute infection/pain). Delirium appears to have resolved. Pain is improving on current pain regiment. Will f/u PRN requirements to see if a long acting opioid can be prescribed. GOC discussion is ongoing pending on OC re-eval on Thursday.
Patient d/w Dr Gill. I agree with her A&P as above.
Patient discharged home today,  Time spent on discharge and discussions with patient: 35 minutes.
68M PMH metastatic neuroendocrine carcinoma (unknown primary, diagnosed via iliac bone biopsy, w/ pulmonary mets, mediastinal/hilar lymphadenopathy, hepatic mets, diffuse bone mets, s/p chemo/RT w/ progression of disease), DM2 (recent admission for DKA), HTN presents from [facility] with generalized weakness.  Patient had recent admission 6/6- 6/12 for DKA, was on Lantus and Invokana.  Invokana was d/c'd, and he was discharged on Lantus and Prandin per endocrine.  Patient was also treated for a UTI w/ ceftriaxone.   Patient was sent to the hospital from [facility] to the hospital on 6/14 due to decreased appetite, abdominal pain, dry cough and reported intermitted confusion.   Patient had CXR which revealed left lung hazziness , WBC of 12.9 on admission , RVP negative and abnormal urinalysis with Pyuria and patient was started on Cefepime and Vancomycin for HCAP/ URI  coverage.  Palliative care was consulted for pain control.  As per Onc team they  will reassess GOC and role for any therapeutics depending on his clinical course, but currently appears he is not a candidate for further disease modifying therapy.  Continue Lantus/Humalog for glycemic control
68M PMH metastatic neuroendocrine carcinoma (unknown primary, diagnosed via iliac bone biopsy, w/ pulmonary mets, mediastinal/hilar lymphadenopathy, hepatic mets, diffuse bone mets, s/p chemo/RT w/ progression of disease), DM2 (recent admission for DKA), HTN presents from [facility] with generalized weakness.  Patient had recent admission 6/6- 6/12 for DKA, was on Lantus and Invokana.  Invokana was d/c'd, and he was discharged on Lantus and Prandin per endocrine.  Patient was also treated for a UTI w/ ceftriaxone.   Patient was sent to the hospital from [facility] to the hospital on 6/14 due to decreased appetite, abdominal pain, dry cough and reported intermitted confusion.   Patient had CXR which revealed left lung hazziness , WBC of 12.9 on admission , RVP negative and abnormal urinalysis with Pyuria and patient was started on Cefepime and Vancomycin for HCAP/ URI  coverage.  Palliative care was consulted for pain control.  As per Onc team they  will reassess GOC and role for any therapeutics depending on his clinical course, but currently appears he is not a candidate for further disease modifying therapy.  Continue Lantus/Humalog for glycemic control . Patient had an increase in Cr ( Vs Spurious Lab) on 6/16 and received IVF for prerenal, but his serum NA has worsened with no change in Mentation , Urine studies on admission with Urine Na = 88 / increas Sosm could be SIADH / and a combination of both prerenal with SIADH.  NO IVF now / Monitor serum Na and mentation/ Might repeat urine studies if Na worsened.  Of note, Ct of chest with no consolidation/ Small pleural Effusion was found.  Pt is currently on cefepime and Vanco was D/C.  Palliative team is on the case for pain control .

## 2018-06-22 NOTE — PROGRESS NOTE ADULT - PROBLEM SELECTOR PROBLEM 1
Pain due to neoplasm
Neuroendocrine cancer
Neuroendocrine cancer
Pain due to neoplasm
Sepsis
Type 2 diabetes mellitus with hyperglycemia, with long-term current use of insulin
Sepsis

## 2018-06-22 NOTE — PROGRESS NOTE ADULT - PROBLEM SELECTOR PLAN 4
Multiple bowel movement after bowel regiment was adjusted.   Will decrease Senna to 2 tabs hs.  Will continue Colace tid. Will change Miralax to PRN. Multiple bowel movement after bowel regiment was adjusted.   Senna was decreased to 2 tabs hs.  Will continue Colace tid.  Miralax was changed to PRN.

## 2018-06-22 NOTE — PROGRESS NOTE ADULT - PROBLEM SELECTOR PLAN 1
Due to AMS and liver impairment will use Dilaudid for pain management  Patient is tolerating po medications.   C/w Dilaudid 1 mg po Q6H ATC   C/w Dilaudid 1 mg po Q2H PRN for breakthrough pain (2 BT in 24 H)  Above regimen discussed with pharmacy. Pills can be broken in half to make 1 mg po dose.

## 2018-06-22 NOTE — PROGRESS NOTE ADULT - PROBLEM SELECTOR PROBLEM 10
Goals of care, counseling/discussion

## 2018-06-22 NOTE — PROGRESS NOTE ADULT - PROBLEM SELECTOR PROBLEM 3
Palliative care encounter
Neuroendocrine cancer
Pleural effusion
Pneumonia
R/O Cholangitis
UTI (urinary tract infection)
Pleural effusion
R/O Cholangitis
R/O Cholangitis

## 2018-06-22 NOTE — PROGRESS NOTE ADULT - PROBLEM SELECTOR PROBLEM 6
Neuroendocrine cancer
Neuroendocrine cancer
Diabetes
Neuroendocrine cancer

## 2018-06-22 NOTE — PROGRESS NOTE ADULT - PROBLEM SELECTOR PLAN 9
- DVT ppx: lovenox
- DVT ppx: lovenox  - Dispo: Home with hospice on Friday
- DVT ppx: lovenox  - Dispo: Home with hospice on Friday

## 2018-06-22 NOTE — PROGRESS NOTE ADULT - PROBLEM SELECTOR PLAN 10
- Home with home hospice today  -Pt now DNR after discussion with palliative; GOC discussions with wife    Dispo: Pending successful transition from IV to PO dilaudid; possibly Thursday

## 2018-06-22 NOTE — PROGRESS NOTE ADULT - ASSESSMENT
68M PMH metastatic neuroendocrine carcinoma (unknown primary, diagnosed via iliac bone biopsy, w/ pulmonary mets, mediastinal/hilar lymphadenopathy, hepatic mets, diffuse bone mets, s/p chemo/RT w/ progression of disease), DM2 (recent admission for DKA), HTN presents from [facility] with generalized weakness found to have sepsis in likely setting of UTI vs tumor fevers; CT negative for PNA.; pt is to be d/jamin home with hospice today.

## 2018-06-22 NOTE — PROGRESS NOTE ADULT - PROBLEM SELECTOR PROBLEM 5
Sinus tachycardia
Sinus tachycardia
Neuroendocrine cancer
Palliative care encounter
Sinus tachycardia

## 2018-06-22 NOTE — PROGRESS NOTE ADULT - SUBJECTIVE AND OBJECTIVE BOX
INTERVAL HPI/OVERNIGHT EVENTS: Patient seen and examined at bedside. He continues to have fluctuation in mental status today described as bouts of confusion and agitation. Otherwise he has some appetite preserved and requested for some "ice cream." Continues to have some pain upon repositioning. 2BT dilaudid po used in 24 hrs and 1 BT ativan. Currently patient says at rest he is comfortable.     Allergies  No Known Allergies  Intolerances    ADVANCE DIRECTIVES:  DNR/DNI  DNR [x]YES  MOLST [ ] YES [x ] NO     MEDICATIONS  (STANDING):  ascorbic acid 500 milliGRAM(s) Oral daily  dextrose 5%. 1000 milliLiter(s) (50 mL/Hr) IV Continuous <Continuous>  dextrose 50% Injectable 12.5 Gram(s) IV Push once  dextrose 50% Injectable 25 Gram(s) IV Push once  dextrose 50% Injectable 25 Gram(s) IV Push once  docusate sodium 100 milliGRAM(s) Oral three times a day  dronabinol 2.5 milliGRAM(s) Oral daily  enoxaparin Injectable 40 milliGRAM(s) SubCutaneous daily  HYDROmorphone   Tablet 1 milliGRAM(s) Oral every 6 hours  insulin glargine Injectable (LANTUS) 5 Unit(s) SubCutaneous every morning  insulin lispro (HumaLOG) corrective regimen sliding scale   SubCutaneous three times a day before meals  insulin lispro (HumaLOG) corrective regimen sliding scale   SubCutaneous at bedtime  insulin lispro Injectable (HumaLOG) 2 Unit(s) SubCutaneous before lunch  insulin lispro Injectable (HumaLOG) 1 Unit(s) SubCutaneous before dinner  insulin lispro Injectable (HumaLOG) 3 Unit(s) SubCutaneous before breakfast  lactated ringers. 1000 milliLiter(s) (100 mL/Hr) IV Continuous <Continuous>  multivitamin 1 Tablet(s) Oral daily  ondansetron Injectable 8 milliGRAM(s) IV Push every 8 hours  senna 2 Tablet(s) Oral at bedtime    MEDICATIONS  (PRN):  acetaminophen   Tablet 650 milliGRAM(s) Oral every 6 hours PRN For Temp greater than 38 C (100.4 F)  bisacodyl Suppository 10 milliGRAM(s) Rectal daily PRN Constipation  dextrose 40% Gel 15 Gram(s) Oral once PRN Blood Glucose LESS THAN 70 milliGRAM(s)/deciliter  glucagon  Injectable 1 milliGRAM(s) IntraMuscular once PRN Glucose LESS THAN 70 milligrams/deciliter  HYDROmorphone   Tablet 1 milliGRAM(s) Oral every 2 hours PRN Breakthrough pain  LORazepam   Injectable 0.2 milliGRAM(s) IV Push every 12 hours PRN Anxiety  naloxone Injectable 0.1 milliGRAM(s) IV Push every 3 minutes PRN sedation or respiratory depression due to opioids  polyethylene glycol 3350 17 Gram(s) Oral daily PRN Constipation      PRESENT SYMPTOMS:  Source: [x ] Patient   [x ] Family   [ ] Team     Pain:                        [ ] No [ x] Yes             [ ] Mild [x ] Moderate to Severe only when applying pressure on his sacrum.       Onset - on deep palpation   Location - RLQ  Duration - intermittent  Character - sharp  Alleviating/Aggravating - aggravated or apparent w/ palpation  Radiation -   Timing -    Dyspnea:                [x] No [ ] Yes             [ ] Mild [ ] Moderate [ ] Severe    Anxiety:                  [x ] No [ ] Yes             [ ] Mild [ ] Moderate [ ] Severe    Fatigue:                  [ ] No [x] Yes             [x ] Mild [ ] Moderate [ ] Severe    Nausea:                  [  ] No [x] Yes             [ x] Mild [ ] Moderate [ ] Severe    Loss of appetite:   [ ] No [x] Yes             [x ] Mild [ ] Moderate [ ] Severe    Constipation:        [x] No [ ] Yes             [ ] Mild [ ] Moderate [ ] Severe    Other Symptoms:  [x ] All other review of systems negative   [ ] Unable to obtain due to poor mentation     Karnofsky Performance Score/Palliative Performance Status Version 2:  30  %    PHYSICAL EXAM:  Vital Signs Last 24 Hrs  T(C): 36.8 (22 Jun 2018 12:52), Max: 37 (21 Jun 2018 20:58)  T(F): 98.2 (22 Jun 2018 12:52), Max: 98.6 (21 Jun 2018 20:58)  HR: 110 (22 Jun 2018 12:52) (106 - 115)  BP: 116/68 (22 Jun 2018 12:52) (116/68 - 127/81)  BP(mean): --  RR: 18 (22 Jun 2018 12:52) (18 - 18)  SpO2: 95% (22 Jun 2018 12:52) (93% - 97%)    General:  [x ] Alert  [x ] Oriented x  2    [ ] Lethargic  [ ] Agitated   [ ] Cachexia   [ ] Unarousable  [x ] Verbal  [ ] Non-Verbal [x] Ill appearing male with episodic acute distress when putting pressure over decubitus ulcer.     HEENT:  [x ] Normal   [ ] Dry mouth   [ ] ET Tube    [ ] Trach  [ ] Oral lesions    Lungs:     [x ] Clear [ ] Tachypnea  [ ] Audible excessive secretions   [ ] Rhonchi        [ ] Right [ ] Left [ ] Bilateral  [ ] Crackles        [ ] Right [ ] Left [ ] Bilateral  [ ] Wheezing     [ ] Right [ ] Left [ ] Bilateral    Cardiovascular:  [x ] Regular [ ] Irregular [ ] Tachycardia   [ ] Bradycardia  [ ] Murmur [ ] Other    Abdomen: [ x] Soft  [ ] Distended   [x ] +BS  [x ] Non tender [ ] Tender  [ ]PEG   [ ]OGT/ NGT   Last BM: 6/20    Genitourinary: [ ] Normal [x ] Incontinent   [ ] Oliguria/Anuria   [ ] Moore    Musculoskeletal:  [ ] Normal   [ ] Weakness  [ x] Bedbound/Wheelchair bound [ ] Edema    Neurological: [ ] No focal deficits  [ ] Cognitive impairment  [ ] Dysphagia [ ] Dysarthria [ ] Paresis [x ] Other: Lethargic     Skin: [ ] Normal   [x ] Pressure ulcer(s) Sacrum                 [ ] Rash    LABS:                        8.8    8.1   )-----------( 197      ( 21 Jun 2018 09:05 )             26.3     06-21    133<L>  |  94<L>  |  9   ----------------------------<  132<H>  3.9   |  28  |  0.40<L>    Ca    7.7<L>      21 Jun 2018 09:05  Phos  3.2     06-21  Mg     1.8     06-21      Shock: [ ] Septic [ ] Cardiogenic [ ] Neurologic [ ] Hypovolemic  Vasopressors x   Inotrophs x     Oral Intake: [ ] Unable/mouth care only [x ] Minimal [ ] Moderate [ ] Full Capability    Diet: [ ] NPO [ ] Tube feeds [ ] TPN [ ] Other     RADIOLOGY & ADDITIONAL STUDIES:  EXAM:  CT ABDOMEN AND PELVIS IC                            PROCEDURE DATE:  06/18/2018    INTERPRETATION:  CLINICAL INFORMATION: Metastatic neuroendocrine tumor.   Elevated alkaline phosphatase and sepsis. Concern for cholangitis.     COMPARISON:   1.  CT Chest on 6/16/2018  2.  CT Chest/abdomen/pelvis on 5/30/2018  3.  PET/CT on 4/16/2018  4.  Abdominal MRI on 11/24/2017IMPRESSION:     No biliary ductal dilatation or evidence of cholangitis.    Bilobar hepatic metastatic disease with suggestion of progression   compared with prior study 5/20/2018.    Pulmonary and osseous metastatic disease without significant change.      REFERRALS:   [ ] Chaplaincy  [x ] Hospice  [ ] Child Life  [ ] Social Work  [ ] Case management [ ] Holistic Therapy   Jasvir Camara MD [phone]

## 2018-06-22 NOTE — PROGRESS NOTE ADULT - PROBLEM SELECTOR PLAN 3
ECOG score 4, KPS 30%  Oncology appreciated:  Patient is not a candidate for further immunotherapy at this time  Patient and Family agrees for symptom management focus and home with hospice.   Hospice referral was already done.

## 2018-06-22 NOTE — PROGRESS NOTE ADULT - PROBLEM SELECTOR PROBLEM 2
Neuroendocrine cancer
Altered mental status, unspecified altered mental status type
Neuroendocrine cancer
Pneumonia
UTI (urinary tract infection)

## 2018-06-22 NOTE — PROGRESS NOTE ADULT - ASSESSMENT
Patient is a 69 y/o M with significant PMH of metastatic neuroendocrine carcinoma (unknown primary, diagnosed via iliac bone biopsy, w/ pulmonary mets, mediastinal/hilar lymphadenopathy, hepatic mets, diffuse bone mets, s/p chemo/RT w/ progression of disease), DM2 (recent admission for DKA), HTN presented from [facility] with generalized weakness with sepsis 2/2 PNA vs. UTI. Hospitalizations complicated by Delirium.  Palliative care following for pain management and ongoing GOC.

## 2018-06-22 NOTE — PROGRESS NOTE ADULT - PROBLEM SELECTOR PROBLEM 4
Pleural effusion
Pneumonia
Acute kidney injury
Constipation, unspecified constipation type
Palliative care encounter
Pleural effusion
Sinus tachycardia
Acute kidney injury
Pleural effusion

## 2018-07-06 ENCOUNTER — APPOINTMENT (OUTPATIENT)
Age: 68
End: 2018-07-06

## 2018-07-27 ENCOUNTER — APPOINTMENT (OUTPATIENT)
Age: 68
End: 2018-07-27

## 2018-07-30 PROBLEM — Z87.2 HISTORY OF DECUBITUS ULCER: Status: RESOLVED | Noted: 2017-12-15 | Resolved: 2018-07-30

## 2020-03-20 NOTE — PROGRESS NOTE ADULT - PROBLEM SELECTOR PLAN 9
He is doing stretches and exercises at home.  He should continue with this.  He does not want to go out with the current pandemic virus and that is reasonable.  Advised to get back to 1 mg a day on the prednisone.  This does not appear to be a flare of his PMR.  
I do not think he is having a flare of his PMR.  He should go back to 1 mg of prednisone.  Lets refer physical therapy for the shoulder, rotator cuff exercises and stretches.      Patient was in the office 3/2/20. He had complaints of left shoulder pain and has dx of PMR on problem list. Patient is currently prescirbed Prednisone 1 mg daily. You administered 40 mg of Depo-Medrol in the left shoulder at 3/2/20 visit.    Per patient, he has had no relief from that injection. He is still in a great deal of pain.     He informed me, a week ago he increased his prednisone to 4 mg daily, which did not help. He then increased to 8 mg daily, which was no help either. Patient is taking 16 mg of prednisone right now, which he says is helping a little bit. Please advise.           
Patient is not interested in PT and does not want to cut back on Prednisone. He is asking to speak with you directly. I informed patient I would get a message to you.   
cont Alprazolam PRN

## 2021-06-13 NOTE — DIETITIAN INITIAL EVALUATION ADULT. - NS AS NUTRI INTERV MEALS SNACK
Patient with Pancytopenia. Hb noted to be 6.6  Platelet 18.  Will transfuse two units of blood . Patient has hx of positive antibody, will get immunoglobin before transfusion as per Dr Mosley.   plan for 2 PRBC. follow post transusion cbc.   Iron profile noted , normal TIBC.  normal serum iron. more likely anemia due to chronic disease.  Will monitor platelet count, patient not actively bleeding. Recommend to continue with current Consistent Carbohydrate diet in consistency tolerated by pt. Provide food preferences within therapeutic diet when requested.

## 2022-06-09 NOTE — PHYSICAL THERAPY INITIAL EVALUATION ADULT - NUMBER OF STAIRS, REHAB EVAL
6-9-22  Reason for Call:  Form, our goal is to have forms completed with 72 hours, however, some forms may require a visit or additional information.    Type of letter, form or note:  Short Term Disabilty Form     Who is the form from?: [facility]    Where did the form come from: form was faxed in    What clinic location was the form placed at?: [place]     Where the form was placed: IN [place] Folder     What number is listed as a contact on the form?: [phone]       Additional comments: received short term disability claim from [employer]  When completed fax back to [phone]  Form is required to be completed ASAP     Call taken on 6/9/2022 at 1:37 PM by Sofia Katz       4

## 2022-06-29 NOTE — PROGRESS NOTE ADULT - PROBLEM SELECTOR PLAN 4
dysarthria an expected post op complication, rest of presentation c/w delerium. nothing about current presentation concerning for seizures    r/o toxic or metabolic derangement causing delirium  delerium precautions  please re-consult if further assistance needed C/w Senna, Miralax qd, dulcolax IN PRN Multiple bowel movement after bowel regiment was adjusted.   Will decrease Senna to 2 tabs hs.  Will continue Colace tid. Will change Miralax to PRN.

## 2022-08-04 NOTE — PROGRESS NOTE ADULT - PROBLEM/PLAN-7
DISPLAY PLAN FREE TEXT
DISPLAY PLAN FREE TEXT
Yes - the patient is able to be screened
DISPLAY PLAN FREE TEXT

## 2022-12-20 NOTE — PROGRESS NOTE ADULT - PROBLEM/PLAN-2
To [facility]    Last visit  3/3/22    Last Refill  busPIRone (BUSPAR) 7.5 MG tablet 60 tablet 0 11/9/2022     Sig: TAKE 1 TABLET BY MOUTH TWICE A DAY      Medication has been pended in encounter for provider approval.    
DISPLAY PLAN FREE TEXT

## 2023-05-23 NOTE — CONSULT NOTE ADULT - BREASTS
Detail Level: Detailed Sunscreen Recommendations: Zinc based sunscreen No masses; no nipple discharge

## 2023-12-18 NOTE — ED ADULT NURSE NOTE - BREATH SOUNDS, MLM
[No Acute Distress] : no acute distress [Normal Sclera/Conjunctiva] : normal sclera/conjunctiva [PERRL] : pupils equal round and reactive to light Clear [EOMI] : extraocular movements intact [Soft] : abdomen soft [Non Tender] : non-tender [Non-distended] : non-distended [Normal Bowel Sounds] : normal bowel sounds [Grossly Normal Strength/Tone] : grossly normal strength/tone [No Focal Deficits] : no focal deficits [Normal Gait] : normal gait [Deep Tendon Reflexes (DTR)] : deep tendon reflexes were 2+ and symmetric [Normal Affect] : the affect was normal [Normal Insight/Judgement] : insight and judgment were intact